# Patient Record
Sex: FEMALE | Race: BLACK OR AFRICAN AMERICAN | Employment: UNEMPLOYED | URBAN - METROPOLITAN AREA
[De-identification: names, ages, dates, MRNs, and addresses within clinical notes are randomized per-mention and may not be internally consistent; named-entity substitution may affect disease eponyms.]

---

## 2017-05-08 ENCOUNTER — TELEPHONE (OUTPATIENT)
Dept: SLEEP MEDICINE | Age: 37
End: 2017-05-08

## 2017-05-08 DIAGNOSIS — G47.33 OSA (OBSTRUCTIVE SLEEP APNEA): Primary | ICD-10-CM

## 2017-05-08 NOTE — TELEPHONE ENCOUNTER
Patient called in requesting that her supply order be sent to [address] [place] [address] since her insurance has changed to [facility].  She does not have a current order for supplies in her chart but was last seen for follow up in 12/16

## 2017-05-08 NOTE — TELEPHONE ENCOUNTER
Orders Placed This Encounter    AMB SUPPLY ORDER     Diagnosis: (G47.33) RILEY (obstructive sleep apnea)  (primary encounter diagnosis)     Replacement Supplies for Positive Airway Pressure Therapy Device:   Duration of need: 99 months.  Oral/Nasal Combo Mask 1 every 3 months.  Oral Cushion Combo Mask (Replace) 2 per month.  Nasal Pillows Combo Mask (Replace) 2 per month.  Full Face Mask 1 every 3 months.  Full Face Mask Cushion 1 per month.  Nasal Cushion (Replace) 2 per month.  Nasal Pillows (Replace) 2 per month.  Nasal Interface Mask 1 every 3 months.  Headgear 1 every 6 months.  Chinstrap 1 every 6 months.  Tubing 1 every 3 months.  Filter(s) Disposable 2 per month.  Filter(s) Non-Disposable 1 every 6 months.  Oral Interface 1 every 3 months. [address] for [employer] (Replace) 1 every 6 months.  Tubing with heating element 1 every 3 months. Perform Mask Fitting per patient preference and comfort - replace as above. Mckenzie Meza MD, FAASM; NPI: [id number]    Electronically signed. Date:- 05-08-17.

## 2017-05-09 NOTE — TELEPHONE ENCOUNTER
Waiting for pt to call back with [facility] [place]. We have Medicare on file  .

## 2017-05-22 ENCOUNTER — DOCUMENTATION ONLY (OUTPATIENT)
Dept: SLEEP MEDICINE | Age: 37
End: 2017-05-22

## 2017-10-17 ENCOUNTER — OFFICE VISIT (OUTPATIENT)
Dept: OBGYN CLINIC | Age: 37
End: 2017-10-17

## 2017-10-17 ENCOUNTER — HOSPITAL ENCOUNTER (OUTPATIENT)
Dept: LAB | Age: 37
Discharge: HOME | End: 2017-10-17

## 2017-10-17 VITALS
HEART RATE: 110 BPM | SYSTOLIC BLOOD PRESSURE: 140 MMHG | WEIGHT: 287.4 LBS | BODY MASS INDEX: 50.92 KG/M2 | HEIGHT: 63 IN | TEMPERATURE: 97.4 F | RESPIRATION RATE: 20 BRPM | DIASTOLIC BLOOD PRESSURE: 90 MMHG

## 2017-10-17 DIAGNOSIS — N76.0 ACUTE VAGINITIS: ICD-10-CM

## 2017-10-17 DIAGNOSIS — Z01.419 WELL WOMAN EXAM WITH ROUTINE GYNECOLOGICAL EXAM: ICD-10-CM

## 2017-10-17 DIAGNOSIS — N89.8 VAGINAL DISCHARGE: ICD-10-CM

## 2017-10-17 DIAGNOSIS — N39.0 FREQUENT UTI: Primary | ICD-10-CM

## 2017-10-17 LAB
BILIRUB UR QL STRIP: NEGATIVE
GLUCOSE UR-MCNC: NEGATIVE MG/DL
KETONES P FAST UR STRIP-MCNC: NORMAL MG/DL
PH UR STRIP: 7 [PH] (ref 4.6–8)
PROT UR QL STRIP: NORMAL MG/DL
SP GR UR STRIP: 1.02 (ref 1–1.03)
UA UROBILINOGEN AMB POC: NORMAL (ref 0.2–1)
URINALYSIS CLARITY POC: CLEAR
URINALYSIS COLOR POC: YELLOW
URINE BLOOD POC: NORMAL
URINE LEUKOCYTES POC: NEGATIVE
URINE NITRITES POC: NEGATIVE

## 2017-10-17 RX ORDER — COLCHICINE 0.6 MG/1
TABLET, FILM COATED ORAL AS NEEDED
COMMUNITY
Start: 2017-09-11

## 2017-10-17 RX ORDER — PREGABALIN 150 MG/1
150 CAPSULE ORAL 2 TIMES DAILY
COMMUNITY
Start: 2017-10-05

## 2017-10-17 NOTE — PROGRESS NOTES
Chief Complaint   Patient presents with    Well Woman     Pt wants STD testing. Pt also complains of frequent yeast infections, and UTI's.

## 2017-10-17 NOTE — PATIENT INSTRUCTIONS
Mammogram: About This Test  What is it? A mammogram is an X-ray of the breast that is used to screen for breast cancer. This test can find tumors that are too small for you or your doctor to feel. Cancer is most easily treated and cured when it is found at an early stage. Why is this test done? A mammogram is done to:  · Look for breast cancer in women who don't have symptoms. · Find breast cancer in women who have symptoms. Symptoms of breast cancer may include a lump or thickening in the breast, nipple discharge, or dimpling of the skin on one area of the breast.  · Find an area of suspicious breast tissue to remove for an exam under a microscope (biopsy). How can you prepare for the test?  · Tell your doctor if you:  ¨ Are or might be pregnant. ¨ Are breastfeeding. ¨ Have breast implants. ¨ Have previously had a breast biopsy. · On the day of the test, don't use any deodorant, perfume, powders, or ointments. What happens before the test?  · You will need to take off any jewelry that might interfere with the X-ray pictures. · You will need to take off your clothes above the waist.  · You will be given a cloth or paper gown to use during the test.  What happens during the test?  · You usually stand during a mammogram.  · One at a time, your breasts will be placed on a flat plate that contains the X-ray film. · Another plate is then pressed firmly against your breast to help flatten out the breast tissue. You may be asked to lift your arm. · For a few seconds while the X-ray picture is being taken, you will need to hold your breath. · At least two pictures are taken of each breast. One is taken from the top and one from the side. What else should you know about the test?  · The X-ray plate will feel cold when you place your breast on it. Having your breasts flattened and squeezed isn't comfortable. But it is necessary to flatten out the breast tissue to get the best pictures.   · Mammograms do not prevent breast cancer or reduce a woman's risk of developing cancer. · Most things that are found during a mammogram are not breast cancer. How long does the test take? · The test will take about 10 to 15 minutes. You may be in the clinic for up to an hour. What happens after the test?  · You will probably be able to go home right away. · You can go back to your usual activities right away. Follow-up care is a key part of your treatment and safety. Be sure to make and go to all appointments, and call your doctor if you are having problems. It's also a good idea to keep a list of the medicines you take. Ask your doctor when you can expect to have your test results. Where can you learn more? Go to http://jd-rossana.info/. Enter J986 in the search box to learn more about \"Mammogram: About This Test.\"  Current as of: July 26, 2016  Content Version: 11.3  © 4020-2416 [employer]. Care instructions adapted under license by [employer] (which disclaims liability or warranty for this information). If you have questions about a medical condition or this instruction, always ask your healthcare professional. [address] any warranty or liability for your use of this information.

## 2017-10-17 NOTE — MR AVS SNAPSHOT
Visit Information Date & Time Provider Department Dept. Phone Encounter #  
 10/17/2017  1:30 PM [phone] [id number] Follow-up Instructions Return in about 1 year (around 10/17/2018). Upcoming Health Maintenance Date Due INFLUENZA AGE 9 TO ADULT 8/1/2017 PAP AKA CERVICAL CYTOLOGY 3/4/2019 Allergies as of 10/17/2017  Review Complete On: 10/17/2017 By: Sarah Beth Tapia NP Severity Noted Reaction Type Reactions Latex  11/17/2011    Rash Current Immunizations  Never Reviewed No immunizations on file. Not reviewed this visit You Were Diagnosed With   
  
 Codes Comments Frequent UTI    -  Primary ICD-10-CM: N39.0 ICD-9-CM: 599.0 Vitals BP Pulse Temp Resp Height(growth percentile) Weight(growth percentile) 140/90 (BP 1 Location: Left arm, BP Patient Position: Sitting) (!) 110 97.4 °F (36.3 °C) (Oral) 20 5' 3\" (1.6 m) 287 lb 6.4 oz (130.4 kg) LMP BMI OB Status Smoking Status 03/25/2013 50.91 kg/m2 Hysterectomy Former Smoker Vitals History BMI and BSA Data Body Mass Index Body Surface Area 50.91 kg/m 2 2.41 m 2 Preferred Pharmacy Pharmacy Name Phone [facility] PRINCESS [email or web address], [address] [phone] Your Updated Medication List  
  
   
This list is accurate as of: 10/17/17  2:45 PM.  Always use your most recent med list.  
  
  
  
  
 COLCRYS 0.6 mg tablet Generic drug:  colchicine CYMBALTA 30 mg capsule Generic drug:  DULoxetine Take 30 mg by mouth daily. hydroCHLOROthiazide 25 mg tablet Commonly known as:  HYDRODIURIL Take 25 mg by mouth daily. HYDROcodone-acetaminophen 5-325 mg per tablet Commonly known as:  Kei Herrlich Take  by mouth. prn  
  
 ibuprofen 200 mg tablet Commonly known as:  MOTRIN Take 3 Tabs by mouth two (2) times a day. JANUVIA 50 mg tablet Generic drug:  SITagliptin Take 50 mg by mouth daily. * LYRICA 50 mg capsule Generic drug:  pregabalin Take  by mouth. * LYRICA 75 mg capsule Generic drug:  pregabalin  
  
 metoprolol tartrate 50 mg tablet Commonly known as:  LOPRESSOR Take  by mouth two (2) times a day. NexIUM 40 mg capsule Generic drug:  esomeprazole Take 40 mg by mouth daily. terconazole 0.4 % vaginal cream  
Commonly known as:  TERAZOL 7 Insert 1 Applicator into vagina nightly. traZODone 50 mg tablet Commonly known as:  Alvarez Awkward Take 50 mg by mouth nightly. WELLBUTRIN  mg tablet Generic drug:  buPROPion XL Take 150 mg by mouth every morning. Indications: DEPRESSION ASSOCIATED WITH MANIC DEPRESSIVE DISORDER * Notice: This list has 2 medication(s) that are the same as other medications prescribed for you. Read the directions carefully, and ask your doctor or other care provider to review them with you. We Performed the Following AMB POC URINALYSIS DIP STICK MANUAL W/O MICRO [14333 CPT(R)] Follow-up Instructions Return in about 1 year (around 10/17/2018). To-Do List   
 11/13/2017 Imaging:  [facility] MAMMO BI SCREENING INCL CAD Patient Instructions Mammogram: About This Test 
What is it? A mammogram is an X-ray of the breast that is used to screen for breast cancer. This test can find tumors that are too small for you or your doctor to feel. Cancer is most easily treated and cured when it is found at an early stage. Why is this test done? A mammogram is done to: 
· Look for breast cancer in women who don't have symptoms. · Find breast cancer in women who have symptoms. Symptoms of breast cancer may include a lump or thickening in the breast, nipple discharge, or dimpling of the skin on one area of the breast. 
· Find an area of suspicious breast tissue to remove for an exam under a microscope (biopsy). How can you prepare for the test? 
· Tell your doctor if you: ¨ Are or might be pregnant. ¨ Are breastfeeding. ¨ Have breast implants. ¨ Have previously had a breast biopsy. · On the day of the test, don't use any deodorant, perfume, powders, or ointments. What happens before the test? 
· You will need to take off any jewelry that might interfere with the X-ray pictures. · You will need to take off your clothes above the waist. 
· You will be given a cloth or paper gown to use during the test. 
What happens during the test? 
· You usually stand during a mammogram. 
· One at a time, your breasts will be placed on a flat plate that contains the X-ray film. · Another plate is then pressed firmly against your breast to help flatten out the breast tissue. You may be asked to lift your arm. · For a few seconds while the X-ray picture is being taken, you will need to hold your breath. · At least two pictures are taken of each breast. One is taken from the top and one from the side. What else should you know about the test? 
· The X-ray plate will feel cold when you place your breast on it. Having your breasts flattened and squeezed isn't comfortable. But it is necessary to flatten out the breast tissue to get the best pictures. · Mammograms do not prevent breast cancer or reduce a woman's risk of developing cancer. · Most things that are found during a mammogram are not breast cancer. How long does the test take? · The test will take about 10 to 15 minutes. You may be in the clinic for up to an hour. What happens after the test? 
· You will probably be able to go home right away. · You can go back to your usual activities right away. Follow-up care is a key part of your treatment and safety. Be sure to make and go to all appointments, and call your doctor if you are having problems. It's also a good idea to keep a list of the medicines you take. Ask your doctor when you can expect to have your test results. Where can you learn more? Go to http://jd-rossana.info/. Enter Y263 in the search box to learn more about \"Mammogram: About This Test.\" Current as of: July 26, 2016 Content Version: 11.3 © 3304-7249 [employer]. Care instructions adapted under license by [employer] (which disclaims liability or warranty for this information). If you have questions about a medical condition or this instruction, always ask your healthcare professional. [address] any warranty or liability for your use of this information. Introducing [facility]! [employer] introduces Slated patient portal. Now you can access parts of your medical record, email your doctor's office, and request medication refills online. 1. In your internet browser, go to [email or web address] 2. Click on the First Time User? Click Here link in the Sign In box. You will see the New Member Sign Up page. 3. Enter your Slated Access Code exactly as it appears below. You will not need to use this code after youve completed the sign-up process. If you do not sign up before the expiration date, you must request a new code. · Slated Access Code: [email or web address] Expires: 1/15/2018  2:45 PM 
 
4. Enter the last four digits of your Social Security Number (xxxx) and Date of Birth (mm/dd/yyyy) as indicated and click Submit. You will be taken to the next sign-up page. 5. Create a Slated ID. This will be your Slated login ID and cannot be changed, so think of one that is secure and easy to remember. 6. Create a Slated password. You can change your password at any time. 7. Enter your Password Reset Question and Answer. This can be used at a later time if you forget your password. 8. Enter your e-mail address. You will receive e-mail notification when new information is available in [address] 9. Click Sign Up. You can now view and download portions of your medical record. 10. Click the Download Summary menu link to download a portable copy of your medical information. If you have questions, please visit the Frequently Asked Questions section of the [employer] website. Remember, [employer] is NOT to be used for urgent needs. For medical emergencies, dial 911. Now available from your iPhone and Android! Please provide this summary of care documentation to your next provider. Your primary care clinician is listed as Merlin Kuster. If you have any questions after today's visit, please call [phone].

## 2017-10-17 NOTE — PROGRESS NOTES
SUBJECTIVE: Elizabeth Guadalupe is a 42 y.o. female  (twins) s/p hyst. who presents desire for annual well woman exam. She desires testing for STD's with complaint of vaginal discharge. She reports a history of frequent urinary infections and desires a urine dipstick today. She is due for mammogram at age 34. Patient's last menstrual period was 2013. GYN History  Dysmenorrhea:  NO  Contraception:  none  Sexually transmitted diseases/infections: denies  Urinary symptoms:  YES  Dyspareunia: NO    Last pap: 2016  The prior Pap result: normal    Past Medical History:   Diagnosis Date    Arthritis      in hands    Diabetes ([address]     prediabetic    Hypertension     Morbid obesity ([address]     Psychiatric disorder     depression    Unspecified sleep apnea        Past Surgical History:   Procedure Laterality Date    CARDIAC SURG PROCEDURE UNLIST      cardian cath    HX CARPAL TUNNEL RELEASE      both hands,    HX GYN      novasure in     HX GYN  13    ROBOTIC ASSISTED TOTAL LAPAROSCOPIC HYSTERECTOMY       Family History   Problem Relation Age of Onset    Heart Disease Father     Cancer Maternal Grandmother      breast cancer    Hypertension Paternal Grandmother     Cancer Paternal Grandmother      breast    Lupus Sister        Social History     Social History    Marital status: SINGLE     Spouse name: N/A    Number of children: N/A    Years of education: N/A     Occupational History    Not on file.      Social History Main Topics    Smoking status: Former Smoker     Quit date: 2016    Smokeless tobacco: Never Used    Alcohol use Yes      Comment: social    Drug use: No    Sexual activity: Yes     Partners: Male     Birth control/ protection: None     Other Topics Concern    Not on file     Social History Narrative       Current Outpatient Prescriptions   Medication Sig Dispense Refill    LYRICA 75 mg capsule       COLCRYS 0.6 mg tablet       HYDROcodone-acetaminophen (NORCO) 5-325 mg per tablet Take  by mouth. prn      pregabalin (LYRICA) 50 mg capsule Take  by mouth.  hydrochlorothiazide (HYDRODIURIL) 25 mg tablet Take 25 mg by mouth daily.  sitaGLIPtin (JANUVIA) 50 mg tablet Take 50 mg by mouth daily.  ibuprofen (MOTRIN) 200 mg tablet Take 3 Tabs by mouth two (2) times a day. 90 Tab 1    esomeprazole (NEXIUM) 40 mg capsule Take 40 mg by mouth daily.  buPROPion XL (WELLBUTRIN XL) 150 mg tablet Take 150 mg by mouth every morning. Indications: DEPRESSION ASSOCIATED WITH MANIC DEPRESSIVE DISORDER      metoprolol (LOPRESSOR) 50 mg tablet Take  by mouth two (2) times a day.  terconazole (TERAZOL 7) 0.4 % vaginal cream Insert 1 Applicator into vagina nightly. 45 g 0    traZODone (DESYREL) 50 mg tablet Take 50 mg by mouth nightly.  DULoxetine (CYMBALTA) 30 mg capsule Take 30 mg by mouth daily. Review of Systems:   Complete review of systems reviewed from social and history data forms. Pertinent positives in HPI. Objective:     Visit Vitals    /90 (BP 1 Location: Left arm, BP Patient Position: Sitting)  Comment (BP 1 Location): manual    Pulse (!) 110    Temp 97.4 °F (36.3 °C) (Oral)    Resp 20    Ht 5' 3\" (1.6 m)    Wt 287 lb 6.4 oz (130.4 kg)    LMP 03/25/2013    BMI 50.91 kg/m2       General:  alert, cooperative, no distress, appears stated age   Skin:  Normal.   Lymph Nodes:  Cervical, supraclavicular, and axillary nodes normal.   Breast Exam: normal appearance, no masses or tenderness    Lungs:  clear to auscultation bilaterally   Heart:  regular rate and rhythm, S1, S2 normal, no murmur, click, rub or gallop   Abdomen: soft, non-tender.  Bowel sounds normal. No masses,  no organomegaly   Back:  Costovertebral angle tenderness absent   Genitourinary: BUS normal. Introitus normal. Normal appearing vaginal epithelium, Vaginal discharge described as normal and physiologic.,  Cervix absent, Uterus absent., Adnexa normal in size left and right without tenderness. Extremities:  extremities normal, atraumatic, no cyanosis or edema     Neurologic:  negative   Psychiatric:  non focal       ASSESSMENT:      ICD-10-CM ICD-9-CM    1. Frequent UTI N39.0 599.0 AMB POC URINALYSIS DIP STICK MANUAL W/O MICRO      [facility] MAMMO BI SCREENING INCL CAD   2. Well woman exam with routine gynecological exam Z01.419 V72.31 PAP IG, RFX HPV ASCU, 25&18,93([id number])   3. Vaginal discharge N89.8 623.5 NUSWAB VAGINITIS PLUS   4. Acute vaginitis  N76.0 616.10 NUSWAB VAGINITIS PLUS     Plan:  Nuswab plus taken. Mammogram in 2018- slip given. RTO 1 year. Pt. Voices understanding of treatment plan. Follow-up Disposition:  Return in about 1 year (around 10/17/2018).       Shania Waite NP

## 2017-10-21 LAB
A VAGINAE DNA VAG QL NAA+PROBE: ABNORMAL SCORE
BVAB2 DNA VAG QL NAA+PROBE: ABNORMAL SCORE
C ALBICANS DNA VAG QL NAA+PROBE: NEGATIVE
C GLABRATA DNA VAG QL NAA+PROBE: NEGATIVE
C TRACH RRNA SPEC QL NAA+PROBE: NEGATIVE
MEGA1 DNA VAG QL NAA+PROBE: ABNORMAL SCORE
N GONORRHOEA RRNA SPEC QL NAA+PROBE: NEGATIVE
T VAGINALIS RRNA SPEC QL NAA+PROBE: NEGATIVE

## 2017-10-23 ENCOUNTER — TELEPHONE (OUTPATIENT)
Dept: OBGYN CLINIC | Age: 37
End: 2017-10-23

## 2017-10-23 RX ORDER — METRONIDAZOLE 500 MG/1
500 TABLET ORAL 2 TIMES DAILY
Qty: 14 TAB | Refills: 0 | Status: SHIPPED | OUTPATIENT
Start: 2017-10-23 | End: 2017-10-30

## 2018-05-24 ENCOUNTER — OFFICE VISIT (OUTPATIENT)
Dept: OBGYN CLINIC | Age: 38
End: 2018-05-24

## 2018-05-24 VITALS
SYSTOLIC BLOOD PRESSURE: 151 MMHG | HEART RATE: 81 BPM | HEIGHT: 63 IN | BODY MASS INDEX: 50.36 KG/M2 | DIASTOLIC BLOOD PRESSURE: 86 MMHG | WEIGHT: 284.2 LBS | TEMPERATURE: 96.9 F | RESPIRATION RATE: 20 BRPM

## 2018-05-24 DIAGNOSIS — N89.8 VAGINAL DISCHARGE: Primary | ICD-10-CM

## 2018-05-24 RX ORDER — NYSTATIN 500000 [USP'U]/1
1 TABLET, COATED ORAL 2 TIMES DAILY
Qty: 14 TAB | Refills: 0 | Status: SHIPPED | OUTPATIENT
Start: 2018-05-24 | End: 2018-05-31

## 2018-05-24 RX ORDER — MOMETASONE FUROATE 50 UG/1
SPRAY, METERED NASAL
COMMUNITY

## 2018-05-24 NOTE — PROGRESS NOTES
Chief Complaint   Patient presents with    Vaginitis     Pt complains of clear discharge for 1-2 weeks. Pt complains of lower abdominal pain for 1-2 weeks. Pt denies any pain, frequency with urination.

## 2018-05-24 NOTE — MR AVS SNAPSHOT
[address] [facility] [address] 1400 [address] [place] [address] 
[phone] Patient: Allan Sheikh MRN: [id number] :1980 Visit Information Date & Time Provider Department Dept. Phone Encounter #  
 2018 10:00 AM Alena Holley NP [facility] OBGYN AT [address] [id number] Upcoming Health Maintenance Date Due Influenza Age 5 to Adult 2018 PAP AKA CERVICAL CYTOLOGY 3/4/2019 Allergies as of 2018  Review Complete On: 2018 By: Alena Holley NP Severity Noted Reaction Type Reactions Latex  2011    Rash Current Immunizations  Never Reviewed No immunizations on file. Not reviewed this visit Vitals BP Pulse Temp Resp Height(growth percentile) Weight(growth percentile) 151/86 (BP 1 Location: Left arm, BP Patient Position: Sitting) 81 96.9 °F (36.1 °C) (Oral) 20 5' 3\" (1.6 m) 284 lb 3.2 oz (128.9 kg) LMP BMI OB Status Smoking Status 2013 50.34 kg/m2 Hysterectomy Former Smoker Vitals History BMI and BSA Data Body Mass Index Body Surface Area  
 50.34 kg/m 2 2.39 m 2 Preferred Pharmacy Pharmacy Name Phone [email or web address], [address] [phone] Your Updated Medication List  
  
   
This list is accurate as of 18 10:51 AM.  Always use your most recent med list.  
  
  
  
  
 COLCRYS 0.6 mg tablet Generic drug:  colchicine CYMBALTA 30 mg capsule Generic drug:  DULoxetine Take 30 mg by mouth daily. hydroCHLOROthiazide 25 mg tablet Commonly known as:  HYDRODIURIL Take 25 mg by mouth daily. HYDROcodone-acetaminophen 5-325 mg per tablet Commonly known as:  Liz [place] Take  by mouth. prn  
  
 ibuprofen 200 mg tablet Commonly known as:  MOTRIN Take 3 Tabs by mouth two (2) times a day. JANUVIA 50 mg tablet Generic drug:  SITagliptin Take 50 mg by mouth daily. * LYRICA 50 mg capsule Generic drug:  pregabalin Take  by mouth. * LYRICA 75 mg capsule Generic drug:  pregabalin 75 mg daily. metoprolol tartrate 50 mg tablet Commonly known as:  LOPRESSOR Take  by mouth two (2) times a day. NASONEX 50 mcg/actuation nasal spray Generic drug:  mometasone Nasonex 50 mcg/actuation Spray NexIUM 40 mg capsule Generic drug:  esomeprazole Take 40 mg by mouth daily. nystatin 500,000 unit Tab Commonly known as:  MYCOSTATIN Take 1 Tab by mouth two (2) times a day for 7 days. terconazole 0.4 % vaginal cream  
Commonly known as:  TERAZOL 7 Insert 1 Applicator into vagina nightly. traZODone 50 mg tablet Commonly known as:  Lucie Richardbach Take 50 mg by mouth nightly. WELLBUTRIN  mg tablet Generic drug:  buPROPion XL Take 150 mg by mouth every morning. Indications: DEPRESSION ASSOCIATED WITH MANIC DEPRESSIVE DISORDER * Notice: This list has 2 medication(s) that are the same as other medications prescribed for you. Read the directions carefully, and ask your doctor or other care provider to review them with you. Prescriptions Printed Refills  
 nystatin (MYCOSTATIN) 500,000 unit tab 0 Sig: Take 1 Tab by mouth two (2) times a day for 7 days. Class: Print Route: Oral  
  
Patient Instructions Vaginal Yeast Infection: Care Instructions Your Care Instructions A vaginal yeast infection is caused by too many yeast cells in the vagina. This is common in women of all ages. Itching, vaginal discharge and irritation, and other symptoms can bother you. But yeast infections don't often cause other health problems. Some medicines can increase your risk of getting a yeast infection. These include antibiotics, birth control pills, hormones, and steroids.  You may also be more likely to get a yeast infection if you are pregnant, have diabetes, douche, or wear tight clothes. With treatment, most yeast infections get better in 2 to 3 days. Follow-up care is a key part of your treatment and safety. Be sure to make and go to all appointments, and call your doctor if you are having problems. It's also a good idea to know your test results and keep a list of the medicines you take. How can you care for yourself at home? · Take your medicines exactly as prescribed. Call your doctor if you think you are having a problem with your medicine. · Ask your doctor about over-the-counter (OTC) medicines for yeast infections. They may cost less than prescription medicines. If you use an OTC treatment, read and follow all instructions on the label. · Do not use tampons while using a vaginal cream or suppository. The tampons can absorb the medicine. Use pads instead. · Wear loose cotton clothing. Do not wear nylon or other fabric that holds body heat and moisture close to the skin. · Try sleeping without underwear. · Do not scratch. Relieve itching with a cold pack or a cool bath. · Do not wash your vaginal area more than once a day. Use plain water or a mild, unscented soap. Air-dry the vaginal area. · Change out of wet swimsuits after swimming. · Do not have sex until you have finished your treatment. · Do not douche. When should you call for help? Call your doctor now or seek immediate medical care if: 
? · You have unexpected vaginal bleeding. ? · You have new or increased pain in your vagina or pelvis. ? Watch closely for changes in your health, and be sure to contact your doctor if: 
? · You have a fever. ? · You are not getting better after 2 days. ? · Your symptoms come back after you finish your medicines. Where can you learn more? Go to http://jd-rossana.info/. Enter S856 in the search box to learn more about \"Vaginal Yeast Infection: Care Instructions. \" Current as of: October 13, 2016 Content Version: 11.4 © 0636-8204 Healthwise, HipFlat. Care instructions adapted under license by [employer] (which disclaims liability or warranty for this information). If you have questions about a medical condition or this instruction, always ask your healthcare professional. [address] any warranty or liability for your use of this information. Introducing [facility]! [employer] introduces InvitedHome patient portal. Now you can access parts of your medical record, email your doctor's office, and request medication refills online. 1. In your internet browser, go to [email or web address] 2. Click on the First Time User? Click Here link in the Sign In box. You will see the New Member Sign Up page. 3. Enter your InvitedHome Access Code exactly as it appears below. You will not need to use this code after youve completed the sign-up process. If you do not sign up before the expiration date, you must request a new code. · InvitedHome Access Code: [email or web address] Expires: 8/22/2018 10:51 AM 
 
4. Enter the last four digits of your Social Security Number (xxxx) and Date of Birth (mm/dd/yyyy) as indicated and click Submit. You will be taken to the next sign-up page. 5. Create a InvitedHome ID. This will be your InvitedHome login ID and cannot be changed, so think of one that is secure and easy to remember. 6. Create a InvitedHome password. You can change your password at any time. 7. Enter your Password Reset Question and Answer. This can be used at a later time if you forget your password. 8. Enter your e-mail address. You will receive e-mail notification when new information is available in [address] 9. Click Sign Up. You can now view and download portions of your medical record. 10. Click the Download Summary menu link to download a portable copy of your medical information. If you have questions, please visit the Frequently Asked Questions section of the [employer] website. Remember, nfon is NOT to be used for urgent needs. For medical emergencies, dial 911. Now available from your iPhone and Android! Please provide this summary of care documentation to your next provider. Your primary care clinician is listed as James Oakes. If you have any questions after today's visit, please call [phone].

## 2018-05-24 NOTE — PROGRESS NOTES
SUBJECTIVE: Jaguar Devries is a 34 [facility]  who presents with complaints of increased vaginal discharge. She thinks she has \"a yeast infection.'  She denies concerns about STD's. She denies itching or vaginal odor. She thinks her symptoms have been present for about 2 weeks. Patient's last menstrual period was 04/17/2013. .    ROS: A comprehensive review of systems was negative except for that written in the HPI. OBJECTIVE:     Visit Vitals    /86 (BP 1 Location: Left arm, BP Patient Position: Sitting)    Pulse 81    Temp 96.9 °F (36.1 °C) (Oral)    Resp 20    Ht 5' 3\" (1.6 m)    Wt 284 lb 3.2 oz (128.9 kg)    LMP 04/17/2013    BMI 50.34 kg/m2         General:  alert, cooperative, no distress, appears stated age   Skin:  Normal.   Abdomen: soft, non-tender. Bowel sounds normal. No masses,  no organomegaly   Back:  Costovertebral angle tenderness absent   Genitourinary: External genitalia: normal general appearance  Urinary system: urethral meatus normal  Vaginal: normal mucosa without prolapse or lesions and discharge,   Cervix: normal appearance  Adnexa: normal bimanual exam  Uterus: normal single, nontender   Extremities:  extremities normal, atraumatic, no cyanosis or edema   Neurologic:  negative   Psychiatric:  non focal       ASSESSMENT:  Vaginitis    Plan:  NuSwab taken. Rx. Nystatin 500,000 units, 1 po bid x 7 days, #14 given. RTO prn. No diagnosis found. Pt. Voices understanding of treatment plan.      Follow-up Disposition: Not on File

## 2018-05-24 NOTE — PATIENT INSTRUCTIONS
Vaginal Yeast Infection: Care Instructions  Your Care Instructions    A vaginal yeast infection is caused by too many yeast cells in the vagina. This is common in women of all ages. Itching, vaginal discharge and irritation, and other symptoms can bother you. But yeast infections don't often cause other health problems. Some medicines can increase your risk of getting a yeast infection. These include antibiotics, birth control pills, hormones, and steroids. You may also be more likely to get a yeast infection if you are pregnant, have diabetes, douche, or wear tight clothes. With treatment, most yeast infections get better in 2 to 3 days. Follow-up care is a key part of your treatment and safety. Be sure to make and go to all appointments, and call your doctor if you are having problems. It's also a good idea to know your test results and keep a list of the medicines you take. How can you care for yourself at home? · Take your medicines exactly as prescribed. Call your doctor if you think you are having a problem with your medicine. · Ask your doctor about over-the-counter (OTC) medicines for yeast infections. They may cost less than prescription medicines. If you use an OTC treatment, read and follow all instructions on the label. · Do not use tampons while using a vaginal cream or suppository. The tampons can absorb the medicine. Use pads instead. · Wear loose cotton clothing. Do not wear nylon or other fabric that holds body heat and moisture close to the skin. · Try sleeping without underwear. · Do not scratch. Relieve itching with a cold pack or a cool bath. · Do not wash your vaginal area more than once a day. Use plain water or a mild, unscented soap. Air-dry the vaginal area. · Change out of wet swimsuits after swimming. · Do not have sex until you have finished your treatment. · Do not douche. When should you call for help?   Call your doctor now or seek immediate medical care if:  ? · You have unexpected vaginal bleeding. ? · You have new or increased pain in your vagina or pelvis. ? Watch closely for changes in your health, and be sure to contact your doctor if:  ? · You have a fever. ? · You are not getting better after 2 days. ? · Your symptoms come back after you finish your medicines. Where can you learn more? Go to http://jd-rossana.info/. Enter O624 in the search box to learn more about \"Vaginal Yeast Infection: Care Instructions. \"  Current as of: October 13, 2016  Content Version: 11.4  © 4343-4560 KnowFu. Care instructions adapted under license by TrueInsider (which disclaims liability or warranty for this information). If you have questions about a medical condition or this instruction, always ask your healthcare professional. [address] any warranty or liability for your use of this information.

## 2018-05-27 LAB
A VAGINAE DNA VAG QL NAA+PROBE: ABNORMAL SCORE
BVAB2 DNA VAG QL NAA+PROBE: ABNORMAL SCORE
C ALBICANS DNA VAG QL NAA+PROBE: POSITIVE
C GLABRATA DNA VAG QL NAA+PROBE: POSITIVE
MEGA1 DNA VAG QL NAA+PROBE: ABNORMAL SCORE
T VAGINALIS RRNA SPEC QL NAA+PROBE: NEGATIVE

## 2018-05-29 ENCOUNTER — TELEPHONE (OUTPATIENT)
Dept: OBGYN CLINIC | Age: 38
End: 2018-05-29

## 2018-05-29 NOTE — TELEPHONE ENCOUNTER
----- Message from Deejay Louis 84, NP sent at 5/29/2018  9:49 AM EDT -----  Please notify pt that she did test positive for a yeast infection and was already treated at her visit.

## 2018-05-29 NOTE — PROGRESS NOTES
Please notify pt that she did test positive for a yeast infection and was already treated at her visit.

## 2018-05-30 ENCOUNTER — DOCUMENTATION ONLY (OUTPATIENT)
Dept: SLEEP MEDICINE | Age: 38
End: 2018-05-30

## 2018-05-30 NOTE — PROGRESS NOTES
Supply order sent to [address] [place] [address] per patient request due to insurance changing.

## 2018-06-21 ENCOUNTER — HOSPITAL ENCOUNTER (OUTPATIENT)
Dept: MAMMOGRAPHY | Age: 38
Discharge: HOME | End: 2018-06-21
Attending: FAMILY MEDICINE
Payer: MEDICARE

## 2018-06-21 DIAGNOSIS — Z12.39 SCREENING BREAST EXAMINATION: ICD-10-CM

## 2018-06-21 PROCEDURE — 77067 SCR MAMMO BI INCL CAD: CPT

## 2018-10-30 ENCOUNTER — OFFICE VISIT (OUTPATIENT)
Dept: OBGYN CLINIC | Age: 38
End: 2018-10-30

## 2018-10-30 VITALS
WEIGHT: 285.4 LBS | BODY MASS INDEX: 50.57 KG/M2 | TEMPERATURE: 97.4 F | RESPIRATION RATE: 20 BRPM | DIASTOLIC BLOOD PRESSURE: 95 MMHG | HEART RATE: 92 BPM | HEIGHT: 63 IN | SYSTOLIC BLOOD PRESSURE: 158 MMHG

## 2018-10-30 DIAGNOSIS — N89.8 VAGINAL DISCHARGE: Primary | ICD-10-CM

## 2018-10-30 DIAGNOSIS — Z11.3 SCREENING EXAMINATION FOR STD (SEXUALLY TRANSMITTED DISEASE): ICD-10-CM

## 2018-10-30 DIAGNOSIS — N89.8 VAGINAL IRRITATION: ICD-10-CM

## 2018-10-30 NOTE — PATIENT INSTRUCTIONS
Vaginitis: Care Instructions  Your Care Instructions    Vaginitis is soreness or infection of the vagina. This common problem can cause itching and burning. And it can cause a change in vaginal discharge. Sometimes it can cause pain during sex. Vaginitis may be caused by bacteria, yeast, or other germs. Some infections that cause it are caught from a sexual partner. Bath products, spermicides, and douches can irritate the vagina too. Some women have this problem during and after menopause. A drop in estrogen levels during this time can cause dryness, soreness, and pain during sex. Your doctor can give you medicine to treat an infection. And home care may help you feel better. For certain types of infections, your sex partner must be treated too. Follow-up care is a key part of your treatment and safety. Be sure to make and go to all appointments, and call your doctor if you are having problems. It's also a good idea to know your test results and keep a list of the medicines you take. How can you care for yourself at home? · If your doctor prescribed antibiotics, take them as directed. Do not stop taking them just because you feel better. You need to take the full course of antibiotics. · Take your medicines exactly as prescribed. Call your doctor if you think you are having a problem with your medicine. · Do not eat or drink anything that has alcohol if you are taking metronidazole (Flagyl). · If you have a yeast infection, use over-the-counter products as your doctor tells you to. Or take medicine your doctor prescribes exactly as directed. · Wash your vaginal area daily with water. You also can use a mild, unscented soap if you want. · Do not use scented bath products. And do not use vaginal sprays or douches. · Put a washcloth soaked in cool water on the area to relieve itching. Or you can take cool baths.   · If you have dryness because of menopause, use estrogen cream or pills that your doctor prescribes. · Ask your doctor about when it is okay to have sex. · Use a personal lubricant before sex if you have dryness. Examples are Astroglide, K-Y Jelly, and Wet Lubricant Gel. · Ask your doctor if your sex partner also needs treatment. When should you call for help? Call your doctor now or seek immediate medical care if:    · You have a fever and pelvic pain.    Watch closely for changes in your health, and be sure to contact your doctor if:    · You have bleeding other than your period.     · You do not get better as expected. Where can you learn more? Go to http://jd-rossana.info/. Enter B486 in the search box to learn more about \"Vaginitis: Care Instructions. \"  Current as of: May 15, 2018  Content Version: 11.8  © 4964-0293 Healthwise, Incorporated. Care instructions adapted under license by Entrisphere (which disclaims liability or warranty for this information). If you have questions about a medical condition or this instruction, always ask your healthcare professional. [address] any warranty or liability for your use of this information.

## 2018-10-30 NOTE — PROGRESS NOTES
SUBJECTIVE: Jyothi Vaughan is a 34 y.o. female G2 [address] s/p hysterectomy who presents with complaints of increased vaginal discharge with irritation on and off for the last 3-4 weeks. Pt. Did have a sexual encounter about 4 weeks ago with out protection and is concerned she may have acquired an STD. She denies pelvic pain. Pt. Also reports that she has been have increased pain in the right upper quadrant with increasing gas, bloating and loose stools. She states she has been advised by her PCP to see a GI doctor however has not followed through with the advice. She denies any urinary symptoms. Patient's last menstrual period was 04/17/2013. .    ROS: A comprehensive review of systems was negative except for that written in the HPI. OBJECTIVE:     Visit Vitals  BP (!) 158/95 (BP 1 Location: Left arm, BP Patient Position: Sitting)   Pulse 92   Temp 97.4 °F (36.3 °C) (Oral)   Resp 20   Ht 5' 3\" (1.6 m)   Wt 285 lb 6.4 oz (129.5 kg)   LMP 04/17/2013   BMI 50.56 kg/m²         General:  alert, cooperative, no distress, appears stated age   Skin:  Normal.   Abdomen: soft, non-tender. Bowel sounds normal. No masses,  no organomegaly   Back:  Costovertebral angle tenderness absent   Genitourinary: External genitalia: normal general appearance  Urinary system: urethral meatus normal  Vaginal: normal mucosa without prolapse or lesions and discharge,   Cervix: absent  Adnexa: normal bimanual exam  Uterus: absent   Extremities:  extremities normal, atraumatic, no cyanosis or edema   Neurologic:  negative   Psychiatric:  non focal       ASSESSMENT:      ICD-10-CM ICD-9-CM    1. Vaginal discharge N89.8 623.5 NUSWAB VAGINITIS PLUS   2. Vaginal irritation N89.8 623.9 NUSWAB VAGINITIS PLUS   3. Screening examination for STD (sexually transmitted disease) Z11.3 V74.5 NUSWAB VAGINITIS PLUS     Plan:  Nuswab plus taken. Refer to Dr. Dayton Carrero services for evaluation of abdominal symptoms.   F/u with test results when they return. Encourage condom use if sexually active. Pt. Voices understanding of treatment plan. Follow-up Disposition:  Return if symptoms worsen or fail to improve.

## 2018-10-30 NOTE — PROGRESS NOTES
Pt complains of sharp pain from the stomach going around to her buttocks. Pt states the pain has been off/on for the past three months. Pt complains of being \"gassy. \"    Pt requests STD testing, pt states she had unprotected sex.     Pt Bp elevated 158/95, pt states she took BP med     PHQ over the last two weeks 10/30/2018   Little interest or pleasure in doing things Several days   Feeling down, depressed, irritable, or hopeless Several days   Total Score PHQ 2 2

## 2018-11-08 ENCOUNTER — TELEPHONE (OUTPATIENT)
Dept: OBGYN CLINIC | Age: 38
End: 2018-11-08

## 2018-11-08 LAB
A VAGINAE DNA VAG QL NAA+PROBE: ABNORMAL SCORE
BVAB2 DNA VAG QL NAA+PROBE: ABNORMAL SCORE
C ALBICANS DNA VAG QL NAA+PROBE: NEGATIVE
C GLABRATA DNA VAG QL NAA+PROBE: POSITIVE
C TRACH RRNA SPEC QL NAA+PROBE: NEGATIVE
MEGA1 DNA VAG QL NAA+PROBE: ABNORMAL SCORE
N GONORRHOEA RRNA SPEC QL NAA+PROBE: NEGATIVE
T VAGINALIS RRNA SPEC QL NAA+PROBE: NEGATIVE

## 2018-11-08 RX ORDER — TERCONAZOLE 4 MG/G
1 CREAM VAGINAL
Qty: 45 G | Refills: 0 | Status: SHIPPED | OUTPATIENT
Start: 2018-11-08 | End: 2020-06-26

## 2018-11-08 NOTE — PROGRESS NOTES
Viewed by Kim Arroyo on 11/8/2018  9:18 AM   Written by Jv Mcfarlane NP on 11/8/2018  9:15 AM   Your vaginal sample for infection has returned showing you are positive for a yeast infection.  You were negative for bacterial vaginosis, Trichomoniasis, gonorrhea and chlamydia.  Medication will be sent to your pharmacy.

## 2019-01-22 ENCOUNTER — OFFICE VISIT (OUTPATIENT)
Dept: SURGERY | Age: 39
End: 2019-01-22

## 2019-01-22 VITALS
HEIGHT: 63 IN | OXYGEN SATURATION: 98 % | SYSTOLIC BLOOD PRESSURE: 149 MMHG | HEART RATE: 95 BPM | TEMPERATURE: 98.7 F | DIASTOLIC BLOOD PRESSURE: 88 MMHG | BODY MASS INDEX: 50.5 KG/M2 | RESPIRATION RATE: 18 BRPM | WEIGHT: 285 LBS

## 2019-01-22 DIAGNOSIS — R10.11 RUQ ABDOMINAL PAIN: Primary | ICD-10-CM

## 2019-01-22 NOTE — PROGRESS NOTES
1. Have you been to the ER, urgent care clinic since your last visit? Hospitalized since your last visit? No    2. Have you seen or consulted any other health care providers outside of the [address] [place] [address] since your last visit? Include any pap smears or colon screening.  No

## 2019-01-28 PROBLEM — R10.11 RUQ ABDOMINAL PAIN: Status: ACTIVE | Noted: 2019-01-28

## 2019-01-28 NOTE — PATIENT INSTRUCTIONS

## 2019-01-28 NOTE — PROGRESS NOTES
Surgery Consult    Subjective:      Zarina Mae is a 34 y.o. female who is being seen for evaluation of abdominal pain. The pain is located in the RUQ with radiation to R back. Pain is described as dull and aching and measures 6/10 in intensity. Onset of pain was several months ago. Aggravating factors include fatty foods. Alleviating factors include light diet. Associated symptoms include nausea. She denies emesis, fever, chills, GERD symptoms, jaundice, diarrhea/change in bowel habits, or dysuria.     Patient Active Problem List    Diagnosis Date Noted    RUQ abdominal pain 2019    Diabetes mellitus type 2, controlled ([address] 2016    Hypertension 2016    Heel spur 2013    History of hysterectomy including cervix 2013    Morbid obesity ([address] 2013    S/P endometrial ablation 2013     Past Medical History:   Diagnosis Date    Arthritis      in hands    Diabetes ([address]     prediabetic    Hypertension     Morbid obesity ([address]     Psychiatric disorder     depression    Unspecified sleep apnea       Past Surgical History:   Procedure Laterality Date    CARDIAC SURG PROCEDURE UNLIST  2009    cardian cath    HX CARPAL TUNNEL RELEASE      both hands,    HX GYN      novasure in     HX GYN  13    ROBOTIC ASSISTED TOTAL LAPAROSCOPIC HYSTERECTOMY      Social History     Tobacco Use    Smoking status: Former Smoker     Last attempt to quit: 2016     Years since quittin.6    Smokeless tobacco: Never Used   Substance Use Topics    Alcohol use: Yes     Comment: social      Family History   Problem Relation Age of Onset    Heart Disease Father     Cancer Maternal Grandmother         breast cancer    Breast Cancer Maternal Grandmother 61    Hypertension Paternal Grandmother     Cancer Paternal Grandmother         breast    Lupus Sister       Current Outpatient Medications   Medication Sig    terconazole (TERAZOL 7) 0.4 % vaginal cream Insert 1 Applicator into vagina nightly.  mometasone (NASONEX) 50 mcg/actuation nasal spray Nasonex 50 mcg/actuation Spray    LYRICA 75 mg capsule 75 mg daily.  COLCRYS 0.6 mg tablet     HYDROcodone-acetaminophen (NORCO) 5-325 mg per tablet Take  by mouth. prn    terconazole (TERAZOL 7) 0.4 % vaginal cream Insert 1 Applicator into vagina nightly.  pregabalin (LYRICA) 50 mg capsule Take  by mouth.  hydrochlorothiazide (HYDRODIURIL) 25 mg tablet Take 25 mg by mouth daily.  sitaGLIPtin (JANUVIA) 50 mg tablet Take 50 mg by mouth daily.  esomeprazole (NEXIUM) 40 mg capsule Take 40 mg by mouth daily.  buPROPion XL (WELLBUTRIN XL) 150 mg tablet Take 150 mg by mouth every morning. Indications: DEPRESSION ASSOCIATED WITH MANIC DEPRESSIVE DISORDER    DULoxetine (CYMBALTA) 30 mg capsule Take 30 mg by mouth daily.  metoprolol (LOPRESSOR) 50 mg tablet Take  by mouth two (2) times a day.  ibuprofen (MOTRIN) 200 mg tablet Take 3 Tabs by mouth two (2) times a day.  traZODone (DESYREL) 50 mg tablet Take 50 mg by mouth nightly. No current facility-administered medications for this visit. Allergies   Allergen Reactions    Latex Rash       Review of Systems:    A complete review of systems was negative except as noted in the HPI. Objective:        Visit Vitals  /88 (BP 1 Location: Left arm, BP Patient Position: Sitting)   Pulse 95   Temp 98.7 °F (37.1 °C) (Oral)   Resp 18   Ht 5' 3\" (1.6 m)   Wt 285 lb (129.3 kg)   LMP 04/17/2013   SpO2 98%   BMI 50.49 kg/m²       Physical Exam:  GENERAL: alert, cooperative, no distress, appears stated age, morbidly obese, EYE: negative, LYMPH NODES: Cervical, supraclavicular nodes normal. THROAT & NECK: normal, LUNG: clear to auscultation bilaterally, HEART: regular rate and rhythm, S1, S2 normal, no murmur. ABDOMEN: Obese, non-distended, soft. RUQ pain with deep palpation; no mass or guarding.  EXTREMITIES:  extremities normal, atraumatic, no cyanosis or edema, SKIN: Normal., NEUROLOGIC: negative    Assessment:     Abdominal pain, suspect biliary pathology; gastroparesis also in differential diagnosis (patient is diabetic). Plan:     1. I recommend proceeding with abdominal ultrasound. 2. Follow-up after above.         Signed By: Jimbo Tyler MD     January 28, 2019

## 2019-02-12 ENCOUNTER — HOSPITAL ENCOUNTER (OUTPATIENT)
Dept: ULTRASOUND IMAGING | Age: 39
Discharge: HOME | End: 2019-02-12
Attending: SURGERY
Payer: MEDICARE

## 2019-02-12 DIAGNOSIS — R10.11 RUQ ABDOMINAL PAIN: ICD-10-CM

## 2019-02-12 PROCEDURE — 76700 US EXAM ABDOM COMPLETE: CPT

## 2019-03-05 ENCOUNTER — DOCUMENTATION ONLY (OUTPATIENT)
Dept: SLEEP MEDICINE | Age: 39
End: 2019-03-05

## 2019-03-05 ENCOUNTER — OFFICE VISIT (OUTPATIENT)
Dept: SLEEP MEDICINE | Age: 39
End: 2019-03-05

## 2019-03-05 VITALS
OXYGEN SATURATION: 96 % | HEIGHT: 63 IN | HEART RATE: 106 BPM | SYSTOLIC BLOOD PRESSURE: 163 MMHG | BODY MASS INDEX: 50.68 KG/M2 | WEIGHT: 286 LBS | DIASTOLIC BLOOD PRESSURE: 95 MMHG

## 2019-03-05 DIAGNOSIS — I10 ESSENTIAL HYPERTENSION: ICD-10-CM

## 2019-03-05 DIAGNOSIS — Z86.59 H/O: DEPRESSION: ICD-10-CM

## 2019-03-05 DIAGNOSIS — G47.33 OSA (OBSTRUCTIVE SLEEP APNEA): Primary | ICD-10-CM

## 2019-03-05 NOTE — PATIENT INSTRUCTIONS
[address] [place], [address]  Tel.  [phone]  Fax. 100 [address], [address] [facility]  Tel.  [phone]  Fax. [phone] [address]  Tel.  [phone]  Fax. [phone]     Learning About CPAP for Sleep Apnea  What is CPAP? CPAP is a small machine that you use at home every night while you sleep. It increases air pressure in your throat to keep your airway open. When you have sleep apnea, this can help you sleep better so you feel much better. CPAP stands for \"continuous positive airway pressure. \"  The CPAP machine will have one of the following:  · A mask that covers your nose and mouth  · Prongs that fit into your nose  · A mask that covers your nose only, the most common type. This type is called NCPAP. The N stands for \"nasal.\"  Why is it done? CPAP is usually the best treatment for obstructive sleep apnea. It is the first treatment choice and the most widely used. Your doctor may suggest CPAP if you have:  · Moderate to severe sleep apnea. · Sleep apnea and coronary artery disease (CAD) or heart failure. How does it help? · CPAP can help you have more normal sleep, so you feel less sleepy and more alert during the daytime. · CPAP may help keep heart failure or other heart problems from getting worse. · NCPAP may help lower your blood pressure. · If you use CPAP, your bed partner may also sleep better because you are not snoring or restless. What are the side effects? Some people who use CPAP have:  · A dry or stuffy nose and a sore throat. · Irritated skin on the face. · Sore eyes. · Bloating. If you have any of these problems, work with your doctor to fix them. Here are some things you can try:  · Be sure the mask or nasal prongs fit well. · See if your doctor can adjust the pressure of your CPAP. · If your nose is dry, try a humidifier.   · If your nose is runny or stuffy, try decongestant medicine or a steroid nasal spray. If these things do not help, you might try a different type of machine. Some machines have air pressure that adjusts on its own. Others have air pressures that are different when you breathe in than when you breathe out. This may reduce discomfort caused by too much pressure in your nose. Where can you learn more? Go to [employer]  Enter Paulino Fritz in the search box to learn more about \"Learning About CPAP for Sleep Apnea. \"   © 4670-3772 Healthwise, Incorporated. Care instructions adapted under license by [facility] [employer] (which disclaims liability or warranty for this information). This care instruction is for use with your licensed healthcare professional. If you have questions about a medical condition or this instruction, always ask your healthcare professional. [address] any warranty or liability for your use of this information. Content Version: 5.2.32876; Last Revised: January 11, 2010  PROPER SLEEP HYGIENE    What to avoid  · Do not have drinks with caffeine, such as coffee or black tea, for 8 hours before bed. · Do not smoke or use other types of tobacco near bedtime. Nicotine is a stimulant and can keep you awake. · Avoid drinking alcohol late in the evening, because it can cause you to wake in the middle of the night. · Do not eat a big meal close to bedtime. If you are hungry, eat a light snack. · Do not drink a lot of water close to bedtime, because the need to urinate may wake you up during the night. · Do not read or watch TV in bed. Use the bed only for sleeping and sexual activity. What to try  · Go to bed at the same time every night, and wake up at the same time every morning. Do not take naps during the day. · Keep your bedroom quiet, dark, and cool. · Get regular exercise, but not within 3 to 4 hours of your bedtime. .  · Sleep on a comfortable pillow and mattress.   · If watching the clock makes you anxious, turn it facing away from you so you cannot see the time. · If you worry when you lie down, start a worry book. Well before bedtime, write down your worries, and then set the book and your concerns aside. · Try meditation or other relaxation techniques before you go to bed. · If you cannot fall asleep, get up and go to another room until you feel sleepy. Do something relaxing. Repeat your bedtime routine before you go to bed again. · Make your house quiet and calm about an hour before bedtime. Turn down the lights, turn off the TV, log off the computer, and turn down the volume on music. This can help you relax after a busy day. Drowsy Driving: The [employer] cites drowsiness as a causing factor in more than 059,178 police reported crashes annually, resulting in 76,000 injuries and 1,500 deaths. Other surveys suggest 55% of people polled have driven while drowsy in the past year, 23% had fallen asleep but not crashed, 3% crashed, and 2% had and accident due to drowsy driving. Who is at risk? Young Drivers: One study of drowsy driving accidents states that 55% of the drivers were under 25 years. Of those, 75% were male. Shift Workers and Travelers: People who work overnight or travel across time zones frequently are at higher risk of experiencing Circadian Rhythm Disorders. They are trying to work and function when their body is programed to sleep. Sleep Deprived: Lack of sleep has a serious impact on your ability to pay attention or focus on a task. Consistently getting less than the average of 8 hours your body needs creates partial or cumulative sleep deprivation. Untreated Sleep Disorders: Sleep Apnea, Narcolepsy, R.L.S., and other sleep disorders (untreated) prevent a person from getting enough restful sleep. This leads to excessive daytime sleepiness and increases the risk for drowsy driving accidents by up to 7 times.   Medications / Alcohol: Even over the counter medications can cause drowsiness. Medications that impair a drivers attention should have a warning label. Alcohol naturally makes you sleepy and on its own can cause accidents. Combined with excessive drowsiness its effects are amplified. Signs of Drowsy Driving:   * You don't remember driving the last few miles   * You may drift out of your darcy   * You are unable to focus and your thoughts wander   * You may yawn more often than normal   * You have difficulty keeping your eyes open / nodding off   * Missing traffic signs, speeding, or tailgating  Prevention-   Good sleep hygiene, lifestyle and behavioral choices have the most impact on drowsy driving. There is no substitute for sleep and the average person requires 8 hours nightly. If you find yourself driving drowsy, stop and sleep. Consider the sleep hygiene tips provided during your visit as well. Medication Refill Policy: Refills for all medications require 1 week advance notice. Please have your pharmacy fax a refill request. We are unable to fax, or call in \"controled substance\" medications and you will need to pick these prescriptions up from our office. [employer] Activation    Thank you for requesting access to [employer]. Please follow the instructions below to securely access and download your online medical record. [employer] allows you to send messages to your doctor, view your test results, renew your prescriptions, schedule appointments, and more. How Do I Sign Up? 1. In your internet browser, go to [email or web address]. 2. Click on the First Time User? Click Here link in the Sign In box. You will see the New Member Sign Up page. 3. Enter your [employer] Access Code exactly as it appears below. You will not need to use this code after youve completed the sign-up process. If you do not sign up before the expiration date, you must request a new code. [employer] Access Code:  Activation code not generated  Current [employer] Status: Active (This is the date your [employer] access code will )    4. Enter the last four digits of your Social Security Number (xxxx) and Date of Birth (mm/dd/yyyy) as indicated and click Submit. You will be taken to the next sign-up page. 5. Create a Oct[employer] ID. This will be your [employer] login ID and cannot be changed, so think of one that is secure and easy to remember. 6. Create a [employer] password. You can change your password at any time. 7. Enter your Password Reset Question and Answer. This can be used at a later time if you forget your password. 8. Enter your e-mail address. You will receive e-mail notification when new information is available in [address] 9. Click Sign Up. You can now view and download portions of your medical record. 10. Click the Download Summary menu link to download a portable copy of your medical information. Additional Information    If you have questions, please call [phone]. Remember, [employer] is NOT to be used for urgent needs. For medical emergencies, dial 911.

## 2019-03-05 NOTE — PROGRESS NOTES
[address] [facility]. [place], [address]  Tel.  [phone]  Fax. 100 [facility] [address]  [place], [address] [facility]  Tel.  [phone]  Fax. [phone] [address] [facility] ([place], [place]) [facility] [facility]  Tel.  [phone]  Fax. [phone]     S>Bianca Langford is a 34 y.o. female seen for a positive airway pressure follow-up. She reports no problems using the device. She is 97% compliant over the past 90 days. The following problems are identified:    Drowsiness no Problems exhaling no   Snoring no Forget to put on no   Mask Comfortable yes Can't fall asleep no   Dry Mouth no Mask falls off no   Air Leaking no Frequent awakenings no         She admits that her sleep has improved on PAP therapy using nasal pillows mask and heated tubing. Allergies   Allergen Reactions    Latex Rash       She has a current medication list which includes the following prescription(s): terconazole, mometasone, lyrica, colcrys, hydrocodone-acetaminophen, terconazole, pregabalin, hydrochlorothiazide, sitagliptin, ibuprofen, esomeprazole, bupropion xl, metoprolol tartrate, and trazodone. .      She  has a past medical history of Arthritis, Diabetes ([address] Hypertension, Morbid obesity ([facility] [address] Psychiatric disorder, and Unspecified sleep apnea. [place] Sleepiness Score: 9   and Modified F.O.S.Q. Score Total / 2: 13.5   which reflect improved sleep quality over therapy time.     O>    Visit Vitals  BP (!) 163/95   Pulse (!) 106   Ht 5' 3\" (1.6 m)   Wt 286 lb (129.7 kg)   LMP 04/17/2013   SpO2 96%   BMI 50.66 kg/m²         General:   Not in acute distress   Eyes:  Anicteric sclerae, no obvious strabismus   Nose:  No obvious nasal septum deviation    Oropharynx:   Class 4 oropharyngeal outlet, thick tongue base, uvula not seen due to low-lying soft palate, narrow tonsilo-pharyngeal pilars   Tonsils:   tonsils are not visualized due to low-lying soft palate   Neck:   midline trachea   Chest/Lungs: Equal lung expansion, clear on auscultation    CVS:  Normal rate, regular rhythm; no JVD   Skin:  Warm to touch; no obvious rashes   Neuro:  No focal deficits ; no obvious tremor    Psych:  Normal affect,  normal countenance;           A>    ICD-10-CM ICD-9-CM    1. RILEY (obstructive sleep apnea) G47.33 327.23 AMB SUPPLY ORDER   2. BMI 50.0-59.9, adult ([facility] Z68.43 V85.43    3. Essential hypertension I10 401.9    4. H/O: depression Z86.59 V11.8      AHI = 48.2 (2016). On Resmed :  APAP 6 - 20 cmH2O. Compliant:      yes    Therapeutic Response:  Positive    P>    * We have recommended a dedicated weight loss through appropriate diet and an exercise regiment as significant weight reduction has been shown to reduce severity of obstructive sleep apnea. * Follow-up Disposition:  Return in about 1 year (around 3/5/2020), or if symptoms worsen or fail to improve. * She was asked to contact our office for any problems regarding PAP therapy. * Counseling was provided regarding the importance of regular PAP use and on proper sleep hygiene and safe driving. * Re-enforced proper and regular cleaning for the device. Thank you for allowing us to participate in your patient's medical care. Anahy Hernandez MD, FAASM  Electronically signed.  03/05/19

## 2019-07-16 ENCOUNTER — HOSPITAL ENCOUNTER (OUTPATIENT)
Dept: MAMMOGRAPHY | Age: 39
Discharge: HOME | End: 2019-07-16
Attending: FAMILY MEDICINE
Payer: MEDICARE

## 2019-07-16 DIAGNOSIS — Z12.39 BREAST SCREENING, UNSPECIFIED: ICD-10-CM

## 2019-07-16 PROCEDURE — 77067 SCR MAMMO BI INCL CAD: CPT

## 2020-02-19 ENCOUNTER — TELEPHONE (OUTPATIENT)
Dept: SLEEP MEDICINE | Age: 40
End: 2020-02-19

## 2020-02-19 DIAGNOSIS — G47.33 OSA (OBSTRUCTIVE SLEEP APNEA): Primary | ICD-10-CM

## 2020-02-19 NOTE — TELEPHONE ENCOUNTER
Orders Placed This Encounter    AMB SUPPLY ORDER     Diagnosis: (G47.33) RILEY (obstructive sleep apnea)  (primary encounter diagnosis)     Replacement Supplies for Positive Airway Pressure Therapy Device:   Duration of need: 99 months.  Nasal Pillows Combo Mask (Replace) 2 per month.  Nasal Pillows (Replace) 2 per month.  Full Face Mask 1 every 3 months.  Full Face Mask Cushion 1 per month.  Nasal Cushion (Replace) 2 per month.  Nasal Interface Mask 1 every 3 months.  Headgear 1 every 6 months.  Chinstrap 1 every 6 months.  Tubing 1 every 3 months.  Tubing with heating element 1 every 3 months.  Filter(s) Disposable 2 per month.  Filter(s) Non-Disposable 1 every 6 months. .   [address] [facility] for Humidifier (Replace) 1 every 6 months. Khloe Flores MD, FAASM; NPI: [id number]    Electronically signed.  Date:- 02/19/20

## 2020-02-20 ENCOUNTER — DOCUMENTATION ONLY (OUTPATIENT)
Dept: SLEEP MEDICINE | Age: 40
End: 2020-02-20

## 2020-02-21 ENCOUNTER — DOCUMENTATION ONLY (OUTPATIENT)
Dept: SLEEP MEDICINE | Age: 40
End: 2020-02-21

## 2020-02-21 ENCOUNTER — TELEPHONE (OUTPATIENT)
Dept: SLEEP MEDICINE | Age: 40
End: 2020-02-21

## 2020-03-05 ENCOUNTER — DOCUMENTATION ONLY (OUTPATIENT)
Dept: SLEEP MEDICINE | Age: 40
End: 2020-03-05

## 2020-03-05 ENCOUNTER — OFFICE VISIT (OUTPATIENT)
Dept: SLEEP MEDICINE | Age: 40
End: 2020-03-05

## 2020-03-05 VITALS
DIASTOLIC BLOOD PRESSURE: 90 MMHG | WEIGHT: 285.6 LBS | SYSTOLIC BLOOD PRESSURE: 157 MMHG | OXYGEN SATURATION: 98 % | HEART RATE: 104 BPM | HEIGHT: 63 IN | TEMPERATURE: 97.1 F | BODY MASS INDEX: 50.61 KG/M2

## 2020-03-05 DIAGNOSIS — I10 ESSENTIAL HYPERTENSION: ICD-10-CM

## 2020-03-05 DIAGNOSIS — G47.33 OSA (OBSTRUCTIVE SLEEP APNEA): Primary | ICD-10-CM

## 2020-03-05 DIAGNOSIS — Z86.59 H/O: DEPRESSION: ICD-10-CM

## 2020-03-05 NOTE — PROGRESS NOTES
[address] [facility] [place]. [place], [address]  Tel.  [phone]  Fax. 100 [facility] 60  [place], [address] [facility]  Tel.  [phone]  Fax. [phone] [address]  Tel.  [phone]  Fax. [phone]     Mauro Myers is a 37 y.o. female seen for a positive airway pressure follow-up. She reports no problems using the device. She is 100% compliant over the past 30 days. The following problems are identified:    Drowsiness no Problems exhaling no   Snoring no Forget to put on no   Mask Comfortable yes Can't fall asleep no   Dry Mouth no Mask falls off no   Air Leaking no Frequent awakenings no         She admits that her sleep has improved on PAP therapy using nasal pillows mask and heated tubing. She reports of a recent machine malfunction (device would not start). She has slept poorly without PAP therapy and is interested in getting back on therapy as soon as possible. Allergies   Allergen Reactions    Latex Rash       She has a current medication list which includes the following prescription(s): terconazole, mometasone, lyrica, colcrys, hydrocodone-acetaminophen, terconazole, pregabalin, hydrochlorothiazide, sitagliptin, ibuprofen, trazodone, esomeprazole, bupropion xl, and metoprolol tartrate. .      She  has a past medical history of Arthritis, Diabetes ([address] Hypertension, Morbid obesity ([address] Psychiatric disorder, and Unspecified sleep apnea. [place] Sleepiness Score: 6   and Modified F.O.S.Q. Score Total / 2: 14.5   which reflect improved sleep quality over therapy time.     O>    Visit Vitals  /90   Pulse (!) 104   Temp 97.1 °F (36.2 °C)   Ht 5' 3\" (1.6 m)   Wt 285 lb 9.6 oz (129.5 kg)   LMP 04/17/2013   SpO2 98%   BMI 50.59 kg/m²         General:   Not in acute distress   Eyes:  Anicteric sclerae, no obvious strabismus   Nose:  No obvious nasal septum deviation    Oropharynx:   Class 4 oropharyngeal outlet, thick tongue base, uvula not seen due to low-lying soft palate, narrow tonsilo-pharyngeal pilars   Tonsils:   tonsils are not visualized due to low-lying soft palate   Neck:   midline trachea   Chest/Lungs:  Equal lung expansion, clear on auscultation    CVS:  Normal rate, regular rhythm; no JVD   Skin:  Warm to touch; no obvious rashes   Neuro:  No focal deficits ; no obvious tremor    Psych:  Normal affect,  normal countenance;           A>    ICD-10-CM ICD-9-CM    1. RILEY (obstructive sleep apnea) G47.33 327.23 AMB SUPPLY ORDER   2. BMI 50.0-59.9, adult ([facility] Z68.43 V85.43    3. Essential hypertension I10 401.9    4. H/O: depression Z86.59 V11.8      AHI = 48.2 (2016). On Resmed :  APAP 6 - 20 cmH2O. Compliant:      yes    Therapeutic Response:  Positive    P>    * Patient is using her PAP device regularly and benefiting form therapy,  continued use of the device at 12 cmH2O is advised. Orders Placed This Encounter    AMB SUPPLY ORDER     Diagnosis: Sleep Apnea ICD-10 Code (G47.30); ICD-9 Code (780.57). Positive Airway Pressure Therapy: Duration of need: 99 months. ResMed Device with Heated Humidifer [id number] / . Set Pressure: 12 cmH2O    Service and Repair patient PAP device. Replace if damaged beyond repair.  Nasal Pillows Combo Mask (Replace) 2 per month.  Nasal Pillows (Replace) 2 per month.  Headgear 1 every 6 months.  Chinstrap 1 every 6 months.  Tubing with heating element 1 every 3 months.  Filter(s) Disposable 2 per month.  Filter(s) Non-Disposable 1 every 6 months. Perform Mask Fitting per patient preference and comfort - replace as above. Nataly Keller MD, FAASM; NPI: [id number]  Electronically signed. 03/05/20       * She is familiar with the telephone monitoring application, is willing to track therapy and agrees to notify use if AHI is >5 per hour.     * She is aware of the relationship between RILEY and HTN which is stable as is her mood (patient is currently on anti-depressant therapy). * We have recommended a dedicated weight loss through appropriate diet and an exercise regiment as significant weight reduction has been shown to reduce severity of obstructive sleep apnea. *   Follow-up and Dispositions    · Return in about 1 year (around 3/5/2021), or if symptoms worsen or fail to improve. * She was asked to contact our office for any problems regarding PAP therapy. * Counseling was provided regarding the importance of regular PAP use and on proper sleep hygiene and safe driving. * Re-enforced proper and regular cleaning for the device. Thank you for allowing us to participate in your patient's medical care. Karli Geiger MD, FAASM  Electronically signed.  03/05/20

## 2020-03-05 NOTE — PATIENT INSTRUCTIONS
[address] [place]. [place], [address]  Tel.  [phone]  Fax. [address] [facility] [address]  [place], [address] [facility]  Tel.  [phone]  Fax. [phone] [address]  Tel.  [phone]  Fax. [phone]     Learning About CPAP for Sleep Apnea  What is CPAP? CPAP is a small machine that you use at home every night while you sleep. It increases air pressure in your throat to keep your airway open. When you have sleep apnea, this can help you sleep better so you feel much better. CPAP stands for \"continuous positive airway pressure. \"  The CPAP machine will have one of the following:  · A mask that covers your nose and mouth  · Prongs that fit into your nose  · A mask that covers your nose only, the most common type. This type is called NCPAP. The N stands for \"nasal.\"  Why is it done? CPAP is usually the best treatment for obstructive sleep apnea. It is the first treatment choice and the most widely used. Your doctor may suggest CPAP if you have:  · Moderate to severe sleep apnea. · Sleep apnea and coronary artery disease (CAD) or heart failure. How does it help? · CPAP can help you have more normal sleep, so you feel less sleepy and more alert during the daytime. · CPAP may help keep heart failure or other heart problems from getting worse. · NCPAP may help lower your blood pressure. · If you use CPAP, your bed partner may also sleep better because you are not snoring or restless. What are the side effects? Some people who use CPAP have:  · A dry or stuffy nose and a sore throat. · Irritated skin on the face. · Sore eyes. · Bloating. If you have any of these problems, work with your doctor to fix them. Here are some things you can try:  · Be sure the mask or nasal prongs fit well. · See if your doctor can adjust the pressure of your CPAP. · If your nose is dry, try a humidifier.   · If your nose is runny or stuffy, try decongestant medicine or a steroid nasal spray. If these things do not help, you might try a different type of machine. Some machines have air pressure that adjusts on its own. Others have air pressures that are different when you breathe in than when you breathe out. This may reduce discomfort caused by too much pressure in your nose. Where can you learn more? Go to [employer]  Enter Becca Else in the search box to learn more about \"Learning About CPAP for Sleep Apnea. \"   © 4597-0724 Healthwise, Incorporated. Care instructions adapted under license by [facility] [employer] (which disclaims liability or warranty for this information). This care instruction is for use with your licensed healthcare professional. If you have questions about a medical condition or this instruction, always ask your healthcare professional. [address] any warranty or liability for your use of this information. Content Version: 4.4.09149; Last Revised: January 11, 2010  PROPER SLEEP HYGIENE    What to avoid  · Do not have drinks with caffeine, such as coffee or black tea, for 8 hours before bed. · Do not smoke or use other types of tobacco near bedtime. Nicotine is a stimulant and can keep you awake. · Avoid drinking alcohol late in the evening, because it can cause you to wake in the middle of the night. · Do not eat a big meal close to bedtime. If you are hungry, eat a light snack. · Do not drink a lot of water close to bedtime, because the need to urinate may wake you up during the night. · Do not read or watch TV in bed. Use the bed only for sleeping and sexual activity. What to try  · Go to bed at the same time every night, and wake up at the same time every morning. Do not take naps during the day. · Keep your bedroom quiet, dark, and cool. · Get regular exercise, but not within 3 to 4 hours of your bedtime. .  · Sleep on a comfortable pillow and mattress.   · If watching the clock makes you anxious, turn it facing away from you so you cannot see the time. · If you worry when you lie down, start a worry book. Well before bedtime, write down your worries, and then set the book and your concerns aside. · Try meditation or other relaxation techniques before you go to bed. · If you cannot fall asleep, get up and go to another room until you feel sleepy. Do something relaxing. Repeat your bedtime routine before you go to bed again. · Make your house quiet and calm about an hour before bedtime. Turn down the lights, turn off the TV, log off the computer, and turn down the volume on music. This can help you relax after a busy day. Drowsy Driving: The [employer] cites drowsiness as a causing factor in more than 265,169 police reported crashes annually, resulting in 76,000 injuries and 1,500 deaths. Other surveys suggest 55% of people polled have driven while drowsy in the past year, 23% had fallen asleep but not crashed, 3% crashed, and 2% had and accident due to drowsy driving. Who is at risk? Young Drivers: One study of drowsy driving accidents states that 55% of the drivers were under 25 years. Of those, 75% were male. Shift Workers and Travelers: People who work overnight or travel across time zones frequently are at higher risk of experiencing Circadian Rhythm Disorders. They are trying to work and function when their body is programed to sleep. Sleep Deprived: Lack of sleep has a serious impact on your ability to pay attention or focus on a task. Consistently getting less than the average of 8 hours your body needs creates partial or cumulative sleep deprivation. Untreated Sleep Disorders: Sleep Apnea, Narcolepsy, R.L.S., and other sleep disorders (untreated) prevent a person from getting enough restful sleep. This leads to excessive daytime sleepiness and increases the risk for drowsy driving accidents by up to 7 times.   Medications / Alcohol: Even over the counter medications can cause drowsiness. Medications that impair a drivers attention should have a warning label. Alcohol naturally makes you sleepy and on its own can cause accidents. Combined with excessive drowsiness its effects are amplified. Signs of Drowsy Driving:   * You don't remember driving the last few miles   * You may drift out of your darcy   * You are unable to focus and your thoughts wander   * You may yawn more often than normal   * You have difficulty keeping your eyes open / nodding off   * Missing traffic signs, speeding, or tailgating  Prevention-   Good sleep hygiene, lifestyle and behavioral choices have the most impact on drowsy driving. There is no substitute for sleep and the average person requires 8 hours nightly. If you find yourself driving drowsy, stop and sleep. Consider the sleep hygiene tips provided during your visit as well. Medication Refill Policy: Refills for all medications require 1 week advance notice. Please have your pharmacy fax a refill request. We are unable to fax, or call in \"controled substance\" medications and you will need to pick these prescriptions up from our office. Spool Activation    Thank you for requesting access to Spool. Please follow the instructions below to securely access and download your online medical record. Spool allows you to send messages to your doctor, view your test results, renew your prescriptions, schedule appointments, and more. How Do I Sign Up? 1. In your internet browser, go to [email or web address]. 2. Click on the First Time User? Click Here link in the Sign In box. You will see the New Member Sign Up page. 3. Enter your Spool Access Code exactly as it appears below. You will not need to use this code after youve completed the sign-up process. If you do not sign up before the expiration date, you must request a new code. Spool Access Code:  Activation code not generated  Current [employer] Status: Active (This is the date your [employer] access code will )    4. Enter the last four digits of your Social Security Number (xxxx) and Date of Birth (mm/dd/yyyy) as indicated and click Submit. You will be taken to the next sign-up page. 5. Create a [employer] ID. This will be your [employer] login ID and cannot be changed, so think of one that is secure and easy to remember. 6. Create a [employer] password. You can change your password at any time. 7. Enter your Password Reset Question and Answer. This can be used at a later time if you forget your password. 8. Enter your e-mail address. You will receive e-mail notification when new information is available in [address] 9. Click Sign Up. You can now view and download portions of your medical record. 10. Click the Download Summary menu link to download a portable copy of your medical information. Additional Information    If you have questions, please call [phone]. Remember, [employer] is NOT to be used for urgent needs. For medical emergencies, dial 911.

## 2020-06-25 ENCOUNTER — TELEPHONE (OUTPATIENT)
Dept: RHEUMATOLOGY | Age: 40
End: 2020-06-25

## 2020-06-25 NOTE — TELEPHONE ENCOUNTER
I called and but could not confirm with the patient on 6/25/2020 for their next day 6/26/2020 appointment but left a voice message to have most recent medical records faxed over or to bring in at appt time. SDH.

## 2020-06-26 ENCOUNTER — OFFICE VISIT (OUTPATIENT)
Dept: RHEUMATOLOGY | Age: 40
End: 2020-06-26

## 2020-06-26 VITALS
DIASTOLIC BLOOD PRESSURE: 83 MMHG | HEIGHT: 63 IN | TEMPERATURE: 97.5 F | SYSTOLIC BLOOD PRESSURE: 129 MMHG | BODY MASS INDEX: 49.43 KG/M2 | RESPIRATION RATE: 18 BRPM | WEIGHT: 279 LBS | HEART RATE: 84 BPM

## 2020-06-26 DIAGNOSIS — E55.9 VITAMIN D DEFICIENCY: ICD-10-CM

## 2020-06-26 DIAGNOSIS — R20.2 PARESTHESIA OF BOTH LOWER EXTREMITIES: ICD-10-CM

## 2020-06-26 DIAGNOSIS — M06.9 RHEUMATOID ARTHRITIS WITH UNKNOWN RHEUMATOID FACTOR STATUS (HCC): Primary | ICD-10-CM

## 2020-06-26 DIAGNOSIS — R20.2 PARESTHESIA AND PAIN OF BOTH UPPER EXTREMITIES: ICD-10-CM

## 2020-06-26 DIAGNOSIS — Z87.39 HISTORY OF GOUT: ICD-10-CM

## 2020-06-26 DIAGNOSIS — M79.601 PARESTHESIA AND PAIN OF BOTH UPPER EXTREMITIES: ICD-10-CM

## 2020-06-26 DIAGNOSIS — M79.602 PARESTHESIA AND PAIN OF BOTH UPPER EXTREMITIES: ICD-10-CM

## 2020-06-26 RX ORDER — PREDNISONE 5 MG/1
TABLET ORAL
Qty: 91 TAB | Refills: 0 | Status: SHIPPED | OUTPATIENT
Start: 2020-06-26 | End: 2020-08-10

## 2020-06-26 NOTE — LETTER
6/26/20 Patient: Natalie Gallego YOB: 1980 Date of Visit: 6/26/2020 Sean Arvizu MD 
[address] [facility] 7 [address] VIA Facsimile: [phone] Dear Sean Arvizu MD, Thank you for referring Ms. Gabriella Singh to [facility] for evaluation. My notes for this consultation are attached. If you have questions, please do not hesitate to call me. I look forward to following your patient along with you.  
 
 
Sincerely, 
 
Ciaran Walsh MD

## 2020-06-26 NOTE — PATIENT INSTRUCTIONS
I will start you on a short course of prednisone, called a prednisone taper, with 5 mg tablets. This regimen is to be taken as follows:      - 4 tablets (20 mg), all at once, daily for 7 days   - 3 tablets (15 mg), all at once, daily for 7 days   - 2 tablets (10 mg), all at once, daily for 14 days   - 1 tablet (5 mg), daily for 14 days   - then STOP    Let me know how you feel on the prednisone after 7 days of taking it.     I referred you to neurology

## 2020-06-26 NOTE — PROGRESS NOTES
REASON FOR VISIT    This is the initial evaluation for Ms. Elizabeth Hines a 41 y.o.  female for question of an inflammatory arthritis. The patient is referred to the [facility] at the request of Dr. Sharda Bach. HISTORY OF PRESENT ILLNESS      I have reviewed and summarized old records from [facility], [facility], and [facility] ([facility])     In 2014, she developed pain and swelling in her hands associated with numbness  She was diagnosed with bilateral carpal tunnel syndrome diagnosed by nerve conduction study and EMG/NCS status post bilateral carpal tunnel releases at [address]. In 12/31/2019, she saw Dr. Niko Hidalgo. Bilateral Hand LEFT: These demonstrate mild arthritic change throughout the hand.  No significant acute findings. RIGHT: These demonstrate mild arthritic change throughout the hand.  No significant acute findings. She was prescribed a Medrol dose pack which did not help. Today, she reports burning and itching pain in her hands involving all her fingers associated with redness. She feels like her bone is sticking out. She feels that Lyrica may help it. Warmth helps. Her pain worsens as the day goes on. She has poor . She feels like her hands lock. She denies swelling in her hands and wrist. She endorses morning stiffness in her hands lasting an hour to hours. She reports having gout based on burning pain in her feet by her podiatry. She has taken colchicine which she feels helps a time. Therapy History includes:  Current DMARD therapy includes: none  Prior DMARD therapy includes: none  The following DMARDs have been ineffective: nonenon  The following DMARDs were stopped because of side effects: none    REVIEW OF SYSTEMS    A 15 point review of systems was performed and summarized below. The questionnaire was reviewed with the patient and scanned into the patient's medical record.     General: denies recent weight gain, recent weight loss, fatigue, weakness, fever, drenching night sweats  Musculoskeletal: endorses joint pain, morning stiffness (lasting 60 minutes), denies joint swelling,  muscle pain  Ears: endorses ringing in ears, denies hearing loss, deafness  Eyes: denies pain, light sensitive, redness, blindness, double vision, blurred vision, excess tearing, dryness, foreign body sensation  Mouth: denies sore tongue, oral ulcers, loss of taste, dryness, increased dental caries  Nose: denies nosebleeds, nasal ulcers  Throat: denies food stuck when swallowing, difficulty with swallowing, hoarseness, pain in jaw while chewing  Neck: denies swollen glands, tender glands  Cardiopulmonary: denies pain in chest with deep breaths, pain in chest when lying down, murmurs, sudden changes in heart beat, wheezing, dry cough, productive cough, shortness of breath at rest, shortness of breath on exertion, coughing of blood  Gastrointestinal: endorses heartburn, denies nausea, stomach pain relieved by food, chronic constipation, chronic diarrhea, blood in stools, black stools  Genitourinary: denies vaginal dryness, pain or burning on urination, blood in urine, cloudy urine, vaginal ulcers  Hematologic: denies anemia, bleeding tendency, blood clots, bleeding gums  Skin: denies easy bruising, hair loss, rash, rash worsened after sun exposure, hives/urticaria, skin thickening, skin tightness, nodules/bumps, color changes of hands or feet in the cold (Raynaud's)  Neurologic: denies numbness or tingling in hands, numbness or tingling in feet, muscle weakness  Psychiatric: endorses depression, denies excessive worries, PTSD, Bipolar  Sleep: endorses poor sleep (6 hours), snoring, apnea,, difficulty falling asleep, difficulty staying asleep denies daytime somnolence    PAST MEDICAL HISTORY    She has a past medical history of Arthritis, Back pain, Diabetes ([address] Gout, Hypertension, Morbid obesity ([address] Neuropathy, Psychiatric disorder, and Unspecified sleep apnea.     FAMILY HISTORY    Her family history includes Breast Cancer (age of onset: 61) in her maternal grandmother; Cancer in her maternal grandmother and paternal grandmother; Crohn's Disease in her sister; Heart Disease in her father; Hypertension in her paternal grandmother; Lupus in her sister and sister; No Known Problems in her mother. SOCIAL HISTORY    She reports that she quit smoking about 4 years ago. She has never used smokeless tobacco. She reports current alcohol use. She reports that she does not use drugs. HEALTH MAINTENANCE    Immunizations    There is no immunization history on file for this patient. MEDICATIONS    Current Outpatient Medications   Medication Sig Dispense Refill    predniSONE (DELTASONE) 5 mg tablet 4 tabs daily for 7 days, 3 tabs for 7 days, 2 tabs for 14 days, 1 tab for 14 days 91 Tab 0    mometasone (NASONEX) 50 mcg/actuation nasal spray Nasonex 50 mcg/actuation Spray      LYRICA 75 mg capsule 150 mg daily.  COLCRYS 0.6 mg tablet as needed.  hydrochlorothiazide (HYDRODIURIL) 25 mg tablet Take 25 mg by mouth daily.  sitaGLIPtin (JANUVIA) 50 mg tablet Take 50 mg by mouth daily.  traZODone (DESYREL) 50 mg tablet Take 50 mg by mouth nightly.  esomeprazole (NEXIUM) 40 mg capsule Take 40 mg by mouth daily.  buPROPion XL (WELLBUTRIN XL) 150 mg tablet Take 150 mg by mouth every morning. Indications: DEPRESSION ASSOCIATED WITH MANIC DEPRESSIVE DISORDER      metoprolol (LOPRESSOR) 50 mg tablet Take  by mouth two (2) times a day. ALLERGIES    Allergies   Allergen Reactions    Latex Rash       PHYSICAL EXAMINATION    Visit Vitals  /83   Pulse 84   Temp 97.5 °F (36.4 °C)   Resp 18   Ht 5' 3\" (1.6 m)   Wt 279 lb (126.6 kg)   BMI 49.42 kg/m²     Body mass index is 49.42 kg/m².     General: Patient is alert, oriented x 3, not in acute distress    HEENT:   Conjunctiva are not injected and appear moist, oral mucous membranes are moist, there are no ulcers present, there is no alopecia, neck is supple, there is no lymphadenopathy. Salivary glands are normal    Cardiovascular:  Heart is regular rate and rhythm, no murmurs. Chest:  Lungs are clear to auscultation bilaterally. Extremities:  Free of clubbing, cyanosis, edema, extremities well perfused. Neurological exam:  Muscle strength is full in upper and lower extremities. Skin exam:  There are no rashes, no tophi, no psoriasis, no active Raynaud's, no livedo reticularis, no periungual erythema. Musculoskeletal exam:  A comprehensive musculoskeletal exam was performed for all joints of each upper and lower extremity and assessed for swelling, tenderness and range of motion.  Pertinent results are documented as below:    Decreased passive extension of right wrist  Tenderness described as burning in interphalageal areas, PIPs and DIP  Bilateral MTP tenderness    Z-Deformities:   no  Wilmore Neck Deformities:  no  Boutonierre's Deformities:  no  Ulnar Deviation:   no  MCP Subluxation:  no    Joint Count 6/26/2020   MHAQ 0.5   Left wrist- Tender 1   Left wrist- Swollen 1   Left 1st MCP - Tender 1   Left 1st MCP - Swollen 0   Left 2nd MCP - Tender 1   Left 2nd MCP - Swollen 0   Left 3rd MCP - Tender 1   Left 3rd MCP - Swollen 0   Left 4th MCP - Tender 1   Left 4th MCP - Swollen 0   Left 5th MCP - Tender 1   Left 5th MCP - Swollen 0   Left thumb IP - Tender 0   Left thumb IP - Swollen 1   Left 2nd PIP - Tender 1   Left 2nd PIP - Swollen 0   Left 3rd PIP - Tender 1   Left 3rd PIP - Swollen 0   Left 4th PIP - Tender 1   Left 4th PIP - Swollen 0   Left 5th PIP - Tender 1   Left 5th PIP - Swollen 0   Right wrist- Tender 1   Right wrist- Swollen 1   Right 1st MCP - Tender 1   Right 1st MCP - Swollen 0   Right 2nd MCP - Tender 1   Right 2nd MCP - Swollen 0   Right 3rd MCP - Tender 1   Right 3rd MCP - Swollen 0   Right 4th MCP - Tender 1   Right 4th MCP - Swollen 0   Right 5th MCP - Tender 1   Right 5th MCP - Swollen 0   Right thumb IP - Tender 1   Right thumb IP - Swollen 0   Right 2nd PIP - Tender 1   Right 2nd PIP - Swollen 0   Right 3rd PIP - Tender 1   Right 3rd PIP - Swollen 0   Right 4th PIP - Tender 1   Right 4th PIP - Swollen 0   Right 5th PIP - Tender 1   Right 5th PIP - Swollen 0   Tender Joint Count (Total) 21   Swollen Joint Count (Total) 3     DATA REVIEW    Prior medical records were reviewed and are summarized as below:    Laboratory data: summarized in the HPI    Imaging: summarized in the HPI. ASSESSMENT AND PLAN    1) Suspected Rheumatoid Arthritis. She has bilateral wrist swelling and decreased ROM of right wrist, suspicious of radiocarpal arthritis, which would be a secondary pathology to injury or inflammatory arthritis. She denies injury. She has neuropathy which improves with Lyrica and was given a Medrol dose pack without benefit. She may have small fiber neuropathy as well. Her sisters have lupus. I ordered labs and radiographs    I will start her on a short course of prednisone, called a prednisone taper, with 5 mg tablets. This regimen is to be taken as follows: 4 tabs (20 mg) for 7 days, 3 tabs (15 mg) for 7 days, 2 tabs (10 mg) for 14 days and then 1 tab (5 mg) for 14 days, and then stop. I informed her to contact me after she completes 7 days of 20 mg to inform me of her response. I explained that she may not likely have any improvement of her burning pain. 2) Bilateral Hand and Foot Neuropathy. She reports burning pruritic pain in her hands and feet that is chronic. Lyrica helps. She has a history of carpal tunnel syndrome s/p release without relief but carpal tunnel syndrome would not involve the 5th digits. I referred her to neurology for further evaluation and testing. She may have small fiber neuropathy, so if her EMG/NCS is normal, I recommend a skin biopsy. I order vitamin B12 and B6.    ADDENDUM:    Vitamin B12 and folate    3) History of Gout.  This was diagnosed by her podiatrist based on burning and throbbing pain in her feet. She feels at times, pain in her big toe and when she takes colchicine, it helps. Colchicine does not treat gout. I will check her uric acid. ADDENDUM:    Uric acid 7.4 mg/dL    4) Vitamin D Deficiency. The patient's vitamin D level was 24.9. I prescribed weekly ergocalciferol 50,000. The patient voiced understanding of the aforementioned assessment and plan. Summary of plan was provided in the After Visit Summary patient instructions. I also provided education about MyChart setup and utility.     TODAY'S ORDERS    Orders Placed This Encounter    QUANTIFERON-TB GOLD PLUS    QUANTIFERON-TB GOLD PLUS    XR HAND RT MIN 3 V    XR HAND LT MIN 3 V    XR FOOT RT MIN 3 V    XR FOOT LT MIN 3 V    CYCLIC CITRUL PEPTIDE AB, IGG    CBC WITH AUTOMATED DIFF    CHRONIC HEPATITIS PANEL    METABOLIC PANEL, COMPREHENSIVE    C REACTIVE PROTEIN, QT    SED RATE (ESR)    RHEUMATOID FACTOR, QL    PROTEIN ELECTROPHORESIS W/ REFLX EMMA    URIC ACID    VITAMIN D, 25 HYDROXY    VITAMIN B12 & FOLATE    TSH REFLEX TO T4    VITAMIN B6    ANTINUCLEAR ANTIBODIES, IFA    SJOGREN'S ABS, SSA AND SSB    SMITH ANTIBODIES    T PALLIDUM SCREEN W/REFLEX    BETA-2 GLYCOPROTEIN I ABS    CARDIOLIPIN AB PANEL    COMPLEMENT, C3 & C4    COMPLEMENT, CH50, TOTAL    DSDNA ANTIBODY BY IFA, CRITHIDIA LUCILIAE, WITH REFLEX TO TITER    LUPUS ANTICOAGULANT PANEL W/ REFLEX    PROT+CREATU (RANDOM)    IMMUNOGLOBULINS, G/A/M, QT.    URINALYSIS W/ REFLEX CULTURE    COMPLEMENT, CH50, TOTAL    CBC WITH AUTOMATED DIFF    METABOLIC PANEL, COMPREHENSIVE    MICROSCOPIC EXAMINATION    PROTEIN ELECTROPHORESIS W/ REFLX EMMA    CHRONIC HEPATITIS PANEL    COMMENT    IMMUNOGLOBULINS, G/A/M, QT.    CARDIOLIPIN AB PANEL    BETA-2 GLYCOPROTEIN I ABS    VITAMIN B12 & FOLATE    VITAMIN B6    RHEUMATOID FACTOR, QL    VITAMIN D, 25 HYDROXY    T PALLIDUM SCREEN W/REFLEX  CYCLIC CITRUL PEPTIDE AB, IGG    TSH REFLEX TO T4    URIC ACID    SED RATE (ESR)    COMPLEMENT, CH50, TOTAL    C REACTIVE PROTEIN, QT    DSDNA ANTIBODY BY IFA, CAL LUCERIKE, WITH REFLEX TO TITER    [facility]    DIRECT SUSIE    predniSONE (DELTASONE) 5 mg tablet    ergocalciferol (ERGOCALCIFEROL) 1,250 mcg (50,000 unit) capsule       Future Appointments   Date Time Provider Florencio Deani   7/10/2020  2:20 PM Afsaneh Mar MD [facility]   10/26/2020 10:00 AM Fabian Jones MD [address] [facility]   3/5/2021 11:40 AM Vinicius Arellano MD [facility] MD [place], [address] [facility]    Adult Rheumatology   Rheumatology Ultrasound Certified  [facility]  A Part of [facility], [address] [place] [address]   Phone [phone]  Fax [phone]

## 2020-06-29 LAB
B2 GLYCOPROT1 IGA SER-ACNC: <9 GPI IGA UNITS (ref 0–25)
B2 GLYCOPROT1 IGG SER-ACNC: <9 GPI IGG UNITS (ref 0–20)
B2 GLYCOPROT1 IGM SER-ACNC: <9 GPI IGM UNITS (ref 0–32)
CARDIOLIPIN IGA SER IA-ACNC: <9 APL U/ML (ref 0–11)
CARDIOLIPIN IGG SER IA-ACNC: <9 GPL U/ML (ref 0–14)
CARDIOLIPIN IGM SER IA-ACNC: <9 MPL U/ML (ref 0–12)
CH50 SERPL-ACNC: >60 U/ML
CH50 SERPL-ACNC: >60 U/ML
DSDNA AB SER QL CLIF: NEGATIVE
IGA SERPL-MCNC: 375 MG/DL (ref 87–352)
IGG SERPL-MCNC: 1662 MG/DL (ref 586–1602)
IGM SERPL-MCNC: 103 MG/DL (ref 26–217)
TREPONEMA PALLIDUM IGG+IGM AB [PRESENCE] IN SERUM OR PLASMA BY IMMUNOASSAY: NON REACTIVE

## 2020-06-30 ENCOUNTER — PATIENT MESSAGE (OUTPATIENT)
Dept: RHEUMATOLOGY | Age: 40
End: 2020-06-30

## 2020-06-30 LAB
25(OH)D3+25(OH)D2 SERPL-MCNC: 24.9 NG/ML (ref 30–100)
ALBUMIN SERPL ELPH-MCNC: 3.7 G/DL (ref 2.9–4.4)
ALBUMIN SERPL-MCNC: 4.4 G/DL (ref 3.8–4.8)
ALBUMIN/GLOB SERPL: 0.9 {RATIO} (ref 0.7–1.7)
ALBUMIN/GLOB SERPL: 1.4 {RATIO} (ref 1.2–2.2)
ALP SERPL-CCNC: 73 IU/L (ref 39–117)
ALPHA1 GLOB SERPL ELPH-MCNC: 0.2 G/DL (ref 0–0.4)
ALPHA2 GLOB SERPL ELPH-MCNC: 0.7 G/DL (ref 0.4–1)
ALT SERPL-CCNC: 16 IU/L (ref 0–32)
ANA TITR SER IF: NEGATIVE {TITER}
APPEARANCE UR: CLEAR
AST SERPL-CCNC: 17 IU/L (ref 0–40)
B-GLOBULIN SERPL ELPH-MCNC: 1.5 G/DL (ref 0.7–1.3)
B2 GLYCOPROT1 IGA SER-ACNC: <9 GPI IGA UNITS (ref 0–25)
B2 GLYCOPROT1 IGG SER-ACNC: <9 GPI IGG UNITS (ref 0–20)
B2 GLYCOPROT1 IGM SER-ACNC: <9 GPI IGM UNITS (ref 0–32)
BACTERIA #/AREA URNS HPF: ABNORMAL /[HPF]
BASOPHILS # BLD AUTO: 0.1 X10E3/UL (ref 0–0.2)
BASOPHILS NFR BLD AUTO: 1 %
BILIRUB SERPL-MCNC: 0.2 MG/DL (ref 0–1.2)
BILIRUB UR QL STRIP: NEGATIVE
BUN SERPL-MCNC: 8 MG/DL (ref 6–24)
BUN/CREAT SERPL: 11 (ref 9–23)
C3 SERPL-MCNC: 212 MG/DL (ref 82–167)
C4 SERPL-MCNC: 48 MG/DL (ref 14–44)
CALCIUM SERPL-MCNC: 9.9 MG/DL (ref 8.7–10.2)
CARDIOLIPIN IGA SER IA-ACNC: <9 APL U/ML (ref 0–11)
CARDIOLIPIN IGG SER IA-ACNC: 10 GPL U/ML (ref 0–14)
CARDIOLIPIN IGM SER IA-ACNC: <9 MPL U/ML (ref 0–12)
CASTS URNS QL MICRO: ABNORMAL /LPF
CCP IGA+IGG SERPL IA-ACNC: 7 UNITS (ref 0–19)
CH50 SERPL-ACNC: >60 U/ML
CHLORIDE SERPL-SCNC: 99 MMOL/L (ref 96–106)
CO2 SERPL-SCNC: 20 MMOL/L (ref 20–29)
COLOR UR: YELLOW
COMMENT, 144067: NORMAL
CREAT SERPL-MCNC: 0.72 MG/DL (ref 0.57–1)
CREAT UR-MCNC: 170.2 MG/DL
CRP SERPL-MCNC: 8 MG/L (ref 0–10)
DAT POLY-SP REAG RBC QL: NEGATIVE
DSDNA AB SER QL CLIF: NEGATIVE
ENA SM AB SER-ACNC: <0.2 AI (ref 0–0.9)
ENA SS-A AB SER-ACNC: <0.2 AI (ref 0–0.9)
ENA SS-B AB SER-ACNC: <0.2 AI (ref 0–0.9)
EOSINOPHIL # BLD AUTO: 0.1 X10E3/UL (ref 0–0.4)
EOSINOPHIL NFR BLD AUTO: 1 %
EPI CELLS #/AREA URNS HPF: ABNORMAL /HPF (ref 0–10)
ERYTHROCYTE [DISTWIDTH] IN BLOOD BY AUTOMATED COUNT: 12.5 % (ref 11.7–15.4)
ERYTHROCYTE [SEDIMENTATION RATE] IN BLOOD BY WESTERGREN METHOD: 43 MM/HR (ref 0–32)
FOLATE SERPL-MCNC: 11.5 NG/ML
GAMMA GLOB SERPL ELPH-MCNC: 1.6 G/DL (ref 0.4–1.8)
GAMMA INTERFERON BACKGROUND BLD IA-ACNC: 0.09 IU/ML
GLOBULIN SER CALC-MCNC: 3.2 G/DL (ref 1.5–4.5)
GLOBULIN SER CALC-MCNC: 3.9 G/DL (ref 2.2–3.9)
GLUCOSE SERPL-MCNC: 150 MG/DL (ref 65–99)
GLUCOSE UR QL: NEGATIVE
HBV CORE AB SERPL QL IA: NEGATIVE
HBV CORE IGM SERPL QL IA: NEGATIVE
HBV E AB SERPL QL IA: NEGATIVE
HBV E AG SERPL QL IA: NEGATIVE
HBV SURFACE AB SER QL: NON REACTIVE
HBV SURFACE AG SERPL QL IA: NEGATIVE
HCT VFR BLD AUTO: 39.5 % (ref 34–46.6)
HCV AB S/CO SERPL IA: <0.1 S/CO RATIO (ref 0–0.9)
HGB BLD-MCNC: 13.6 G/DL (ref 11.1–15.9)
HGB UR QL STRIP: NEGATIVE
IGA SERPL-MCNC: 379 MG/DL (ref 87–352)
IGG SERPL-MCNC: 1758 MG/DL (ref 586–1602)
IGM SERPL-MCNC: 97 MG/DL (ref 26–217)
IMM GRANULOCYTES # BLD AUTO: 0 X10E3/UL (ref 0–0.1)
IMM GRANULOCYTES NFR BLD AUTO: 0 %
INTERPRETATION, 117893: NORMAL
KETONES UR QL STRIP: NEGATIVE
LEUKOCYTE ESTERASE UR QL STRIP: NEGATIVE
LYMPHOCYTES # BLD AUTO: 3.9 X10E3/UL (ref 0.7–3.1)
LYMPHOCYTES NFR BLD AUTO: 42 %
M PROTEIN SERPL ELPH-MCNC: ABNORMAL G/DL
M TB IFN-G BLD-IMP: NEGATIVE
M TB IFN-G CD4+ BCKGRND COR BLD-ACNC: 0.15 IU/ML
MCH RBC QN AUTO: 30.7 PG (ref 26.6–33)
MCHC RBC AUTO-ENTMCNC: 34.4 G/DL (ref 31.5–35.7)
MCV RBC AUTO: 89 FL (ref 79–97)
MICRO URNS: NORMAL
MICRO URNS: NORMAL
MITOGEN IGNF BLD-ACNC: >10 IU/ML
MONOCYTES # BLD AUTO: 0.6 X10E3/UL (ref 0.1–0.9)
MONOCYTES NFR BLD AUTO: 6 %
MUCOUS THREADS URNS QL MICRO: PRESENT
NEUTROPHILS # BLD AUTO: 4.6 X10E3/UL (ref 1.4–7)
NEUTROPHILS NFR BLD AUTO: 50 %
NITRITE UR QL STRIP: NEGATIVE
PH UR STRIP: 5.5 [PH] (ref 5–7.5)
PLATELET # BLD AUTO: 328 X10E3/UL (ref 150–450)
PLEASE NOTE, 011150: ABNORMAL
POTASSIUM SERPL-SCNC: 4.2 MMOL/L (ref 3.5–5.2)
PROT PATTERN SERPL ELPH-IMP: ABNORMAL
PROT SERPL-MCNC: 7.6 G/DL (ref 6–8.5)
PROT UR QL STRIP: NEGATIVE
PROT UR-MCNC: 13.4 MG/DL
PROT/CREAT UR: 79 MG/G CREAT (ref 0–200)
QUANTIFERON INCUBATION, QF1T: NORMAL
QUANTIFERON TB2 AG: 0.13 IU/ML
RBC # BLD AUTO: 4.43 X10E6/UL (ref 3.77–5.28)
RBC #/AREA URNS HPF: ABNORMAL /HPF (ref 0–2)
RHEUMATOID FACT SERPL-ACNC: 10.2 IU/ML (ref 0–13.9)
SCREEN APTT: 31.9 SEC (ref 0–51.9)
SCREEN DRVVT: 42.1 SEC (ref 0–47)
SERVICE CMNT-IMP: NORMAL
SODIUM SERPL-SCNC: 136 MMOL/L (ref 134–144)
SP GR UR: 1.02 (ref 1–1.03)
TREPONEMA PALLIDUM IGG+IGM AB [PRESENCE] IN SERUM OR PLASMA BY IMMUNOASSAY: NON REACTIVE
TSH SERPL DL<=0.005 MIU/L-ACNC: 2.46 UIU/ML (ref 0.45–4.5)
URATE SERPL-MCNC: 7.4 MG/DL (ref 2.5–7.1)
URINALYSIS REFLEX, 377202: NORMAL
UROBILINOGEN UR STRIP-MCNC: 0.2 MG/DL (ref 0.2–1)
VIT B12 SERPL-MCNC: 356 PG/ML (ref 232–1245)
VIT B6 SERPL-MCNC: 10.5 UG/L (ref 2–32.8)
WBC # BLD AUTO: 9.3 X10E3/UL (ref 3.4–10.8)
WBC #/AREA URNS HPF: ABNORMAL /HPF (ref 0–5)

## 2020-07-01 PROBLEM — E55.9 VITAMIN D DEFICIENCY: Status: ACTIVE | Noted: 2020-07-01

## 2020-07-01 RX ORDER — ERGOCALCIFEROL 1.25 MG/1
50000 CAPSULE ORAL
Qty: 12 CAP | Refills: 4 | Status: SHIPPED | OUTPATIENT
Start: 2020-07-01 | End: 2020-10-26 | Stop reason: SDUPTHER

## 2020-07-01 RX ORDER — FOLIC ACID 1 MG/1
1 TABLET ORAL DAILY
Qty: 90 TAB | Refills: 0 | Status: SHIPPED | OUTPATIENT
Start: 2020-07-01 | End: 2020-08-26 | Stop reason: SDUPTHER

## 2020-07-01 RX ORDER — METHOTREXATE 2.5 MG/1
15 TABLET ORAL
Qty: 72 TAB | Refills: 0 | Status: SHIPPED | OUTPATIENT
Start: 2020-07-03 | End: 2020-08-20

## 2020-07-01 NOTE — PROGRESS NOTES
The results were reviewed. Elevated inflammatory markers (ESR, C3, C4). SPEP/EMMA shows polyclonal gammopathy (elevated IgA, IgG). Vitamin D is low (24.9). I will prescribe weekly vitamin D called ergocalciferol 50,000. Stop daily supplement. Uric acid 7.4 mg/dL (gout level). All remaining labs are normal/negative. No evidence of lupus.

## 2020-07-01 NOTE — TELEPHONE ENCOUNTER
From: Sheltonrandy Delong  Sent: 7/1/2020 3:52 PM EDT  To: Venus Antunez MD  Subject: RE: Test Results Question    Yes it's still off and on and then I fell over the weekend too    ----- Message -----  From: Venus Antunez MD  Sent: 7/1/20 2:34 PM  To: Shelton Baljeet  Subject: RE: Test Results Question    So are you having pain in your hands still?      ----- Message -----   From:Winterian Marquita Mahmood   Sent:7/1/2020 2:32 PM EDT   To:Ad Eller MD   Subject:RE: Test Results Question      No I'm not The medicine is okay because I took it before for my hands before and for my feet it helps for a little while       ----- Message -----   Edmundo Kennedy MD   Sent:7/1/2020 1:35 PM EDT   To:Bianca Mahmood   Subject:RE: Test Results Question    What do you mean relaxing? Are you taking any NSAIDs? If you're having stomach upset, stop it.      ----- Message -----   From:Bianca Mahmood   Sent:7/1/2020 9:32 AM EDT   To:Ad Eller MD   Subject:RE: Test Results Question    It is relaxing a little bit but it just makes my stomach hurt real bad      ----- Message -----   Edmundo Kennedy MD   Sent:7/1/2020 6:12 AM EDT   To:Bianca Mahmood   Subject:RE: Test Results Question    Just released them. How are you feeling on prednisone?      ----- Message -----   From:Bianca Mahmood   Sent:6/30/2020 4:42 PM EDT   To:Ad Eller MD   Subject:Test Results Question    Just want to know are you going to let me know what my blood results mean because I do not understand some of them       - - - DISCLAIMER - - -  [email or web address][This message may contain formatting that cannot be shown on this site.]

## 2020-07-02 ENCOUNTER — HOSPITAL ENCOUNTER (OUTPATIENT)
Dept: GENERAL RADIOLOGY | Age: 40
Discharge: HOME | End: 2020-07-02
Payer: MEDICARE

## 2020-07-02 DIAGNOSIS — M06.9 RHEUMATOID ARTHRITIS WITH UNKNOWN RHEUMATOID FACTOR STATUS (HCC): ICD-10-CM

## 2020-07-02 PROCEDURE — 73130 X-RAY EXAM OF HAND: CPT

## 2020-07-02 PROCEDURE — 73630 X-RAY EXAM OF FOOT: CPT

## 2020-07-06 NOTE — PROGRESS NOTES
The results were reviewed. Erosive bilateral first metatarsal tarsal arthropathy, which may be seen in inflammatory arthritis, like Rheumatoid Arthritis.

## 2020-07-06 NOTE — PROGRESS NOTES
The results were reviewed. Erosive bilateral first metatarsal tarsal arthropathy, which may be seen in inflammatory arthritis, such as Rheumatoid Arthritis.

## 2020-07-17 ENCOUNTER — HOSPITAL ENCOUNTER (OUTPATIENT)
Dept: MAMMOGRAPHY | Age: 40
Discharge: HOME | End: 2020-07-17
Attending: FAMILY MEDICINE
Payer: MEDICARE

## 2020-07-17 DIAGNOSIS — Z12.31 VISIT FOR SCREENING MAMMOGRAM: ICD-10-CM

## 2020-07-17 PROCEDURE — 77067 SCR MAMMO BI INCL CAD: CPT

## 2020-07-21 ENCOUNTER — TELEPHONE (OUTPATIENT)
Dept: SLEEP MEDICINE | Age: 40
End: 2020-07-21

## 2020-08-26 RX ORDER — SITAGLIPTIN AND METFORMIN HYDROCHLORIDE 50; 1000 MG/1; MG/1
1 TABLET, FILM COATED ORAL DAILY
COMMUNITY

## 2020-08-27 ENCOUNTER — VIRTUAL VISIT (OUTPATIENT)
Dept: RHEUMATOLOGY | Age: 40
End: 2020-08-27
Payer: MEDICARE

## 2020-08-27 DIAGNOSIS — M06.09 SERONEGATIVE RHEUMATOID ARTHRITIS OF MULTIPLE SITES (HCC): Primary | ICD-10-CM

## 2020-08-27 DIAGNOSIS — M79.602 PARESTHESIA AND PAIN OF BOTH UPPER EXTREMITIES: ICD-10-CM

## 2020-08-27 DIAGNOSIS — R20.2 PARESTHESIA AND PAIN OF BOTH UPPER EXTREMITIES: ICD-10-CM

## 2020-08-27 DIAGNOSIS — Z79.60 LONG-TERM USE OF IMMUNOSUPPRESSANT MEDICATION: ICD-10-CM

## 2020-08-27 DIAGNOSIS — M79.601 PARESTHESIA AND PAIN OF BOTH UPPER EXTREMITIES: ICD-10-CM

## 2020-08-27 DIAGNOSIS — E55.9 VITAMIN D DEFICIENCY: ICD-10-CM

## 2020-08-27 PROCEDURE — G8432 DEP SCR NOT DOC, RNG: HCPCS | Performed by: INTERNAL MEDICINE

## 2020-08-27 PROCEDURE — G8427 DOCREV CUR MEDS BY ELIG CLIN: HCPCS | Performed by: INTERNAL MEDICINE

## 2020-08-27 PROCEDURE — 99215 OFFICE O/P EST HI 40 MIN: CPT | Performed by: INTERNAL MEDICINE

## 2020-08-27 PROCEDURE — G8756 NO BP MEASURE DOC: HCPCS | Performed by: INTERNAL MEDICINE

## 2020-08-27 RX ORDER — METHOTREXATE 2.5 MG/1
TABLET ORAL
Qty: 72 TAB | Refills: 0 | Status: SHIPPED | OUTPATIENT
Start: 2020-08-27 | End: 2020-08-27

## 2020-08-27 RX ORDER — METHOTREXATE 2.5 MG/1
20 TABLET ORAL
Qty: 96 TAB | Refills: 0 | Status: SHIPPED | OUTPATIENT
Start: 2020-08-28 | End: 2020-09-14 | Stop reason: SDUPTHER

## 2020-08-27 RX ORDER — FOLIC ACID 1 MG/1
1 TABLET ORAL DAILY
Qty: 90 TAB | Refills: 0 | Status: SHIPPED | OUTPATIENT
Start: 2020-08-27 | End: 2020-10-26 | Stop reason: SDUPTHER

## 2020-08-27 NOTE — PROGRESS NOTES
REASON FOR VISIT    This is a follow-up visit for Ms. Rosa Isela Naranjo for     ICD-10-CM   1. Seronegative rheumatoid arthritis of multiple sites [facility])  M06.09     The patient has consented for synchronous (real-time) Telemedicine (audio-video technology) on 8/27/2020 for their care to be delivered over telemedicine in place of their regularly scheduled office visit pursuant to the emergency declaration under the [address] and the [address] authority and the [employer] Act, this Virtual  Visit was conducted, with patient's consent, to reduce the patient's risk of exposure to COVID-19 and provide continuity of care for an established patient. Services were provided through a video synchronous discussion virtually to substitute for in-person clinic visit. Inflammatory arthritis phenotype includes:  Anti-CCP positive: no  Rheumatoid factor positive: no  Erosive disease: yes  Extra-articular manifestations include: polyclonal gammopathy (elevated IgA, IgG)    Immunosuppression Screening (6/26/2020):  Quantiferon TB: negative  PPD:  Not performed  Hepatitis B: negative  Hepatitis C: negative    Therapy History includes:  Current DMARD therapy include: methotrexate 15 mg every Friday (7/03/2020 to present)  Prior DMARD therapy include: none  Discontinued DMARDs because of inefficacy: None  Discontinued DMARDs because of side effects: None    There is no immunization history on file for this patient.     Patient Active Problem List   Diagnosis Code    Morbid obesity ([facility] E66.01    S/P endometrial ablation Z98.890    History of hysterectomy including cervix Z90.710    Heel spur M77.30    Diabetes mellitus type 2, controlled ([address] E11.9    Hypertension I10    RUQ abdominal pain R10.11    Vitamin D deficiency E55.9    Seronegative rheumatoid arthritis of multiple sites ([facility] M06.09    Long-term use of immunosuppressant medication W27.759       Kiki Revering    Ms. Janet Keys returns for a follow-up. On her last visit, I ordered labs and radiographs and started a prednisone taper, which helped her, so I started methotrexate 15 mg every Friday, which she has taken with good tolerance. I reviewed her foot radiograph which showed erosive disease in her 1st MTP base. Today, she feels much better. She has less burning and aching pain compared to before. She has burning aching pain in her hands with some swelling associated with pruritis. She has swelling in her wrists. Her foot pain is much better as well and is no longer is aching burning like before. She denies fever, weight loss, blurred vision, vision loss, oral ulcers, ankle swelling, dry cough, dyspnea, nausea, vomiting, dysphagia, abdominal pain, black or bloody stool, fall since last visit, rash, easy bruising and increased thirst.    Last toxicity monitoring by blood work was done on 6/26/2020 and did not reveal any significant adverse effects. Most recent inflammatory markers from 6/26/2020 revealed a ESR 43 mm/hr and CRP 8 mg/L. The patient has not had any interval hospital admissions, infections, or surgeries. REVIEW OF SYSTEMS    A comprehensive review of systems was performed and pertinent results are documented in the HPI, review of systems is otherwise non-contributory. PAST MEDICAL HISTORY    She has a past medical history of Arthritis, Back pain, Diabetes ([address] Gout, Hypertension, Morbid obesity ([address] Neuropathy, Psychiatric disorder, and Unspecified sleep apnea. FAMILY HISTORY    Her family history includes Breast Cancer in her paternal grandmother; Breast Cancer (age of onset: 61) in her maternal grandmother; Cancer in her maternal grandmother and paternal grandmother; Crohn's Disease in her sister; Heart Disease in her father; Hypertension in her paternal grandmother; Lupus in her sister and sister;  No Known Problems in her mother. SOCIAL HISTORY    She reports that she quit smoking about 4 years ago. She has never used smokeless tobacco. She reports current alcohol use. She reports that she does not use drugs. MEDICATIONS    Current Outpatient Medications   Medication Sig Dispense Refill    folic acid (FOLVITE) 1 mg tablet Take 1 Tab by mouth daily. 90 Tab 0    [START ON 8/28/2020] methotrexate (RHEUMATREX) 2.5 mg tablet Take 8 Tabs by mouth Every Friday. 96 Tab 0    SITagliptin-metFORMIN (Janumet) 50-1,000 mg per tablet Take 1 Tab by mouth daily.  ergocalciferol (ERGOCALCIFEROL) 1,250 mcg (50,000 unit) capsule Take 1 Cap by mouth every seven (7) days. Indications: vitamin D deficiency (high dose therapy) 12 Cap 4    mometasone (NASONEX) 50 mcg/actuation nasal spray Nasonex 50 mcg/actuation Spray      LYRICA 75 mg capsule 150 mg daily.  COLCRYS 0.6 mg tablet as needed.  hydrochlorothiazide (HYDRODIURIL) 25 mg tablet Take 25 mg by mouth daily.  traZODone (DESYREL) 50 mg tablet Take 50 mg by mouth nightly.  esomeprazole (NEXIUM) 40 mg capsule Take 40 mg by mouth daily.  buPROPion XL (WELLBUTRIN XL) 150 mg tablet Take 150 mg by mouth every morning. Indications: DEPRESSION ASSOCIATED WITH MANIC DEPRESSIVE DISORDER      metoprolol (LOPRESSOR) 50 mg tablet Take  by mouth two (2) times a day. ALLERGIES    Allergies   Allergen Reactions    Latex Rash       PHYSICAL EXAMINATION    There were no vitals taken for this visit. There is no height or weight on file to calculate BMI. General: Patient is alert, oriented x 3, not in acute distress    HEENT:   Sclerae are not injected and appear moist.  There is no alopecia. Neck is supple     Chest:  Breathing comfortably at room air. Musculoskeletal exam:  A comprehensive musculoskeletal exam was NOT performed for all joints of each upper and lower extremity and assessed for swelling, tenderness and range of motion.  Positive results are documented as below:    Previous Exam    Decreased passive extension of right wrist  Tenderness described as burning in interphalageal areas, PIPs and DIP  Bilateral MTP tenderness     Z-Deformities:   no  [place] Neck Deformities:  no  Boutonierre's Deformities:  no  Ulnar Deviation:   no  MCP Subluxation:  no     Joint Count 6/26/2020   MHAQ 0.5   Left wrist- Tender 1   Left wrist- Swollen 1   Left 1st MCP - Tender 1   Left 1st MCP - Swollen 0   Left 2nd MCP - Tender 1   Left 2nd MCP - Swollen 0   Left 3rd MCP - Tender 1   Left 3rd MCP - Swollen 0   Left 4th MCP - Tender 1   Left 4th MCP - Swollen 0   Left 5th MCP - Tender 1   Left 5th MCP - Swollen 0   Left thumb IP - Tender 0   Left thumb IP - Swollen 1   Left 2nd PIP - Tender 1   Left 2nd PIP - Swollen 0   Left 3rd PIP - Tender 1   Left 3rd PIP - Swollen 0   Left 4th PIP - Tender 1   Left 4th PIP - Swollen 0   Left 5th PIP - Tender 1   Left 5th PIP - Swollen 0   Right wrist- Tender 1   Right wrist- Swollen 1   Right 1st MCP - Tender 1   Right 1st MCP - Swollen 0   Right 2nd MCP - Tender 1   Right 2nd MCP - Swollen 0   Right 3rd MCP - Tender 1   Right 3rd MCP - Swollen 0   Right 4th MCP - Tender 1   Right 4th MCP - Swollen 0   Right 5th MCP - Tender 1   Right 5th MCP - Swollen 0   Right thumb IP - Tender 1   Right thumb IP - Swollen 0   Right 2nd PIP - Tender 1   Right 2nd PIP - Swollen 0   Right 3rd PIP - Tender 1   Right 3rd PIP - Swollen 0   Right 4th PIP - Tender 1   Right 4th PIP - Swollen 0   Right 5th PIP - Tender 1   Right 5th PIP - Swollen 0   Tender Joint Count (Total) 21   Swollen Joint Count (Total) 3       DATA REVIEW    Laboratory     Recent laboratory results were reviewed, summarized, and discussed with the patient. Imaging    Musculoskeletal Ultrasound    None    Radiographs    Bilateral Hand 7/02/2020: RIGHT: no fracture or other acute osseous or articular abnormality. The soft tissues are within normal limits.  Mineralization is normal. There are no erosions. There is no evidence for CPPD. LEFT: no fracture or other acute osseous or articular abnormality. The soft tissues are within normal limits. Mineralization is normal. There are no erosions. There is no evidence for CPPD    Bilateral Foot 7/02/2020: RIGHT: no fracture. Mineralization is normal. There is an erosive change at the first metatarsal tarsal junction. A large plantar spur is seen. RIGHT: no fracture. Mineralization is normal. There is a severe erosive change at the first metatarsal tarsal junction. A large plantar spur is seen. Bilateral Hand 12/31/2019: LEFT: These demonstrate mild arthritic change throughout the hand.  No significant acute findings. RIGHT: These demonstrate mild arthritic change throughout the hand.  No significant acute findings. She was prescribed a Medrol dose pack which did not help. CT Imaging    None    MR Imaging    None    DXA     None    ASSESSMENT AND PLAN    This is a follow-up visit for Ms. Julius Ervin. 1) Suspected Seronegative Erosive Rheumatoid Arthritis. She has bilateral wrist swelling and decreased ROM of right wrist, suspicious of radiocarpal arthritis, which would be a secondary pathology to injury or inflammatory arthritis. She denies injury. She has neuropathy which improves with Lyrica and was given a Medrol dose pack without benefit. She may have small fiber neuropathy as well. Her sisters have lupus. She has been on methotrexate 15 mg every Friday with good tolerance since 7/03/2020. She feels much better. She reports burning pain and feet that has also improved, although that is unclear to me, since that is neuropathic. Since she feels better, I will continue methotrexate but will increase her dose to 20 mg weekly. I ordered labs which will be mailed. 2) Bilateral Hand and Foot Neuropathy. She reports burning pruritic pain in her hands and feet that is chronic. Lyrica helps.  She has a history of carpal tunnel syndrome s/p release without relief but carpal tunnel syndrome would not involve the 5th digits. Vitamin B12 and folate were normal. I had referred her to neurology for further evaluation and testing. She may have small fiber neuropathy, so if her EMG/NCS is normal, I recommend a skin biopsy. 3) Long Term Use of Immunosuppressants. The patient remains on immunomodulatory medications (methotrexate) and requires frequent toxicity monitoring by blood work. Respective labs were ordered (CBC and CMP) will be mailed. 4) Vitamin D Deficiency. The patient's vitamin D level was 24.9. I had prescribed weekly ergocalciferol 50,000. I will check her level on follow up.    5) History of Gout. This was diagnosed by her podiatrist based on burning and throbbing pain in her feet. She feels at times, pain in her big toe and when she takes colchicine, it helps. Her history is not consistent with gout. Her uric acid 7.4 mg/dL    The patient has consented for synchronous (real-time) Telemedicine (audio-video technology) on 8/27/2020 for their care to be delivered over telemedicine in place of their regularly scheduled office visit pursuant to the emergency declaration under the [address] and the [facility] waiver authority and the [employer] Act, this Virtual  Visit was conducted, with patient's consent, to reduce the patient's risk of exposure to COVID-19 and provide continuity of care for an established patient. Services were provided through a video synchronous discussion virtually to substitute for in-person clinic visit.     TODAY'S ORDERS    Orders Placed This Encounter    CBC WITH AUTOMATED DIFF    METABOLIC PANEL, COMPREHENSIVE    SED RATE (ESR)    C REACTIVE PROTEIN, QT    METHOTREXATE POLYGLUTAMATES    folic acid (FOLVITE) 1 mg tablet    methotrexate (RHEUMATREX) 2.5 mg tablet     Future Appointments   Date Time Provider Florencio [place]   10/26/2020 10:00 AM Kristan Tapia MD [facility] BS AMB   3/5/2021 11:40 AM Kate Nunez MD [facility] BS AMB     Castillo Zee MD, [address] [place] [address]    Adult Rheumatology   Rheumatology Ultrasound Certified  [facility]  A Part of [facility], [address] [place] [address] Road   Phone [phone]  Fax [phone]

## 2020-09-14 RX ORDER — METHOTREXATE 2.5 MG/1
20 TABLET ORAL
Qty: 96 TAB | Refills: 0 | Status: SHIPPED | OUTPATIENT
Start: 2020-09-18 | End: 2020-10-26 | Stop reason: SDUPTHER

## 2020-09-14 RX ORDER — METHOTREXATE 2.5 MG/1
20 TABLET ORAL
Qty: 96 TAB | Refills: 0 | Status: SHIPPED | OUTPATIENT
Start: 2020-09-18 | End: 2020-09-14 | Stop reason: SDUPTHER

## 2020-09-22 ENCOUNTER — TELEPHONE (OUTPATIENT)
Dept: SLEEP MEDICINE | Age: 40
End: 2020-09-22

## 2020-09-22 LAB
25(OH)D3+25(OH)D2 SERPL-MCNC: 32.7 NG/ML (ref 30–100)
ALBUMIN SERPL ELPH-MCNC: 3.5 G/DL (ref 2.9–4.4)
ALBUMIN SERPL-MCNC: 4 G/DL (ref 3.8–4.8)
ALBUMIN/GLOB SERPL: 0.9 {RATIO} (ref 0.7–1.7)
ALBUMIN/GLOB SERPL: 1.3 {RATIO} (ref 1.2–2.2)
ALP SERPL-CCNC: 73 IU/L (ref 39–117)
ALPHA1 GLOB SERPL ELPH-MCNC: 0.1 G/DL (ref 0–0.4)
ALPHA2 GLOB SERPL ELPH-MCNC: 0.8 G/DL (ref 0.4–1)
ALT SERPL-CCNC: 10 IU/L (ref 0–32)
AST SERPL-CCNC: 14 IU/L (ref 0–40)
B-GLOBULIN SERPL ELPH-MCNC: 1.2 G/DL (ref 0.7–1.3)
BASOPHILS # BLD AUTO: 0.1 X10E3/UL (ref 0–0.2)
BASOPHILS NFR BLD AUTO: 1 %
BILIRUB SERPL-MCNC: 0.3 MG/DL (ref 0–1.2)
BUN SERPL-MCNC: 6 MG/DL (ref 6–24)
BUN/CREAT SERPL: 8 (ref 9–23)
CALCIUM SERPL-MCNC: 9.7 MG/DL (ref 8.7–10.2)
CCP IGA+IGG SERPL IA-ACNC: 6 UNITS (ref 0–19)
CHLORIDE SERPL-SCNC: 100 MMOL/L (ref 96–106)
CO2 SERPL-SCNC: 22 MMOL/L (ref 20–29)
COMMENT, 144067: NORMAL
CREAT SERPL-MCNC: 0.72 MG/DL (ref 0.57–1)
CRP SERPL-MCNC: 11 MG/L (ref 0–10)
EOSINOPHIL # BLD AUTO: 0.2 X10E3/UL (ref 0–0.4)
EOSINOPHIL NFR BLD AUTO: 3 %
ERYTHROCYTE [DISTWIDTH] IN BLOOD BY AUTOMATED COUNT: 13.1 % (ref 11.7–15.4)
ERYTHROCYTE [SEDIMENTATION RATE] IN BLOOD BY WESTERGREN METHOD: 60 MM/HR (ref 0–32)
FOLATE SERPL-MCNC: 10.7 NG/ML
GAMMA GLOB SERPL ELPH-MCNC: 1.5 G/DL (ref 0.4–1.8)
GAMMA INTERFERON BACKGROUND BLD IA-ACNC: 0.02 IU/ML
GLOBULIN SER CALC-MCNC: 3.2 G/DL (ref 1.5–4.5)
GLOBULIN SER CALC-MCNC: 3.7 G/DL (ref 2.2–3.9)
GLUCOSE SERPL-MCNC: 121 MG/DL (ref 65–99)
HBV CORE AB SERPL QL IA: NEGATIVE
HBV CORE IGM SERPL QL IA: NEGATIVE
HBV E AB SERPL QL IA: NEGATIVE
HBV E AG SERPL QL IA: NEGATIVE
HBV SURFACE AB SER QL: NON REACTIVE
HBV SURFACE AG SERPL QL IA: NEGATIVE
HCT VFR BLD AUTO: 39.5 % (ref 34–46.6)
HCV AB S/CO SERPL IA: <0.1 S/CO RATIO (ref 0–0.9)
HGB BLD-MCNC: 13.2 G/DL (ref 11.1–15.9)
IMM GRANULOCYTES # BLD AUTO: 0 X10E3/UL (ref 0–0.1)
IMM GRANULOCYTES NFR BLD AUTO: 0 %
LYMPHOCYTES # BLD AUTO: 4 X10E3/UL (ref 0.7–3.1)
LYMPHOCYTES NFR BLD AUTO: 44 %
M PROTEIN SERPL ELPH-MCNC: NORMAL G/DL
M TB IFN-G BLD-IMP: NEGATIVE
M TB IFN-G CD4+ BCKGRND COR BLD-ACNC: 0.02 IU/ML
MCH RBC QN AUTO: 30.3 PG (ref 26.6–33)
MCHC RBC AUTO-ENTMCNC: 33.4 G/DL (ref 31.5–35.7)
MCV RBC AUTO: 91 FL (ref 79–97)
MITOGEN IGNF BLD-ACNC: >10 IU/ML
MONOCYTES # BLD AUTO: 0.6 X10E3/UL (ref 0.1–0.9)
MONOCYTES NFR BLD AUTO: 7 %
NEUTROPHILS # BLD AUTO: 4.2 X10E3/UL (ref 1.4–7)
NEUTROPHILS NFR BLD AUTO: 45 %
PLATELET # BLD AUTO: 326 X10E3/UL (ref 150–450)
PLEASE NOTE, 011150: NORMAL
POTASSIUM SERPL-SCNC: 4.4 MMOL/L (ref 3.5–5.2)
PROT PATTERN SERPL ELPH-IMP: NORMAL
PROT SERPL-MCNC: 7.2 G/DL (ref 6–8.5)
QUANTIFERON INCUBATION, QF1T: NORMAL
QUANTIFERON TB2 AG: 0.02 IU/ML
RBC # BLD AUTO: 4.35 X10E6/UL (ref 3.77–5.28)
RHEUMATOID FACT SERPL-ACNC: <10 IU/ML (ref 0–13.9)
SERVICE CMNT-IMP: NORMAL
SODIUM SERPL-SCNC: 138 MMOL/L (ref 134–144)
TSH SERPL DL<=0.005 MIU/L-ACNC: 2.76 UIU/ML (ref 0.45–4.5)
URATE SERPL-MCNC: 6.9 MG/DL (ref 2.5–7.1)
VIT B12 SERPL-MCNC: 392 PG/ML (ref 232–1245)
VIT B6 SERPL-MCNC: 8.8 UG/L (ref 2–32.8)
WBC # BLD AUTO: 9 X10E3/UL (ref 3.4–10.8)

## 2020-09-22 NOTE — TELEPHONE ENCOUNTER
The patient phoned the office stating that [employer] sent her small nasal pillows for her PAP device, and she needs medium. I spoke with Akbar Vital at [facility] and she said that she will send the request to that department in order for the patient to get the correct supplies.

## 2020-09-27 NOTE — PROGRESS NOTES
The results were reviewed. Elevated inflammatory marker (ESR 60 mm/hr, CRP 11 mg/L). All remaining labs are normal/negative.

## 2020-10-02 ENCOUNTER — DOCUMENTATION ONLY (OUTPATIENT)
Dept: SLEEP MEDICINE | Age: 40
End: 2020-10-02

## 2020-10-26 ENCOUNTER — TELEPHONE (OUTPATIENT)
Dept: RHEUMATOLOGY | Age: 40
End: 2020-10-26

## 2020-10-26 ENCOUNTER — VIRTUAL VISIT (OUTPATIENT)
Dept: RHEUMATOLOGY | Age: 40
End: 2020-10-26
Payer: MEDICARE

## 2020-10-26 DIAGNOSIS — E55.9 VITAMIN D DEFICIENCY: ICD-10-CM

## 2020-10-26 DIAGNOSIS — M79.602 PARESTHESIA AND PAIN OF BOTH UPPER EXTREMITIES: ICD-10-CM

## 2020-10-26 DIAGNOSIS — Z79.60 LONG-TERM USE OF IMMUNOSUPPRESSANT MEDICATION: ICD-10-CM

## 2020-10-26 DIAGNOSIS — R20.2 PARESTHESIA AND PAIN OF BOTH UPPER EXTREMITIES: ICD-10-CM

## 2020-10-26 DIAGNOSIS — M79.601 PARESTHESIA AND PAIN OF BOTH UPPER EXTREMITIES: ICD-10-CM

## 2020-10-26 DIAGNOSIS — M06.09 SERONEGATIVE RHEUMATOID ARTHRITIS OF MULTIPLE SITES (HCC): Primary | ICD-10-CM

## 2020-10-26 PROCEDURE — G8428 CUR MEDS NOT DOCUMENT: HCPCS | Performed by: INTERNAL MEDICINE

## 2020-10-26 PROCEDURE — 99215 OFFICE O/P EST HI 40 MIN: CPT | Performed by: INTERNAL MEDICINE

## 2020-10-26 PROCEDURE — G8756 NO BP MEASURE DOC: HCPCS | Performed by: INTERNAL MEDICINE

## 2020-10-26 PROCEDURE — G8432 DEP SCR NOT DOC, RNG: HCPCS | Performed by: INTERNAL MEDICINE

## 2020-10-26 RX ORDER — METHOTREXATE 2.5 MG/1
20 TABLET ORAL
Qty: 96 TAB | Refills: 0 | Status: SHIPPED | OUTPATIENT
Start: 2020-10-30 | End: 2020-12-17

## 2020-10-26 RX ORDER — FOLIC ACID 1 MG/1
1 TABLET ORAL DAILY
Qty: 90 TAB | Refills: 5 | Status: SHIPPED | OUTPATIENT
Start: 2020-10-26 | End: 2021-04-23 | Stop reason: SDUPTHER

## 2020-10-26 RX ORDER — ERGOCALCIFEROL 1.25 MG/1
50000 CAPSULE ORAL
Qty: 12 CAP | Refills: 4 | Status: SHIPPED | OUTPATIENT
Start: 2020-10-26 | End: 2021-04-23 | Stop reason: SDUPTHER

## 2020-10-26 NOTE — PROGRESS NOTES
REASON FOR VISIT    This is a follow-up visit for Ms. Alejandro Hussein for     ICD-10-CM   1. Seronegative rheumatoid arthritis of multiple sites [facility])  M06.09     The patient has consented for synchronous (real-time) Telemedicine (audio-video technology) on 10/26/2020 for their care to be delivered over telemedicine in place of their regularly scheduled office visit pursuant to the emergency declaration under the [address] [place] [address] and the [employer] Act, this Virtual  Visit was conducted, with patient's consent, to reduce the patient's risk of exposure to COVID-19 and provide continuity of care for an established patient. Services were provided through a video synchronous discussion virtually to substitute for in-person clinic visit. Inflammatory arthritis phenotype includes:  Anti-CCP positive: no  Rheumatoid factor positive: no  Erosive disease: yes  Extra-articular manifestations include: polyclonal gammopathy (elevated IgA, IgG)    Immunosuppression Screening (6/26/2020):  Quantiferon TB: negative  PPD:  Not performed  Hepatitis B: negative  Hepatitis C: negative    Therapy History includes:  Current DMARD therapy include: methotrexate 20 mg every Friday (7/03/2020 to present)  Prior DMARD therapy include: none  Discontinued DMARDs because of inefficacy: None  Discontinued DMARDs because of side effects: None    HISTORY OF PRESENT ILLNESS    Ms. Alejandro Hussein returns for a follow-up. On her last visit, I increased her methotrexate from 15 mg to 20 mg every Friday, which she has taken with good tolerance. I ordered lab which I reviewed with her which showed elevated ESR and CRP. Today, she feels a little better. She burning and aching pain in her hands in the morning lasting 15 minutes. She feels there is welling in her left hand in the morning. She has morning stiffness lasting 15 minutes in the morning. She feels better with methotrexate. Her wrists no longer bother. She has aching and burning pain in her feet in the morning lasting around 20 minutes and then gets better. She has not seen neurology yet. She also has cramping in her knees and calves that is worse in the morning. She denies fever, weight loss, blurred vision, vision loss, oral ulcers, ankle swelling, dry cough, dyspnea, nausea, vomiting, dysphagia, abdominal pain, black or bloody stool, fall since last visit, rash, easy bruising and increased thirst.    Last toxicity monitoring by blood work was done on 9/16/2020 and did not reveal any significant adverse effects. Most recent inflammatory markers from 9/16/2020 revealed a ESR 60 mm/hr and CRP 11 mg/L. The patient has not had any interval hospital admissions, infections, or surgeries. REVIEW OF SYSTEMS    A comprehensive review of systems was performed and pertinent results are documented in the HPI, review of systems is otherwise non-contributory. PAST MEDICAL HISTORY    She has a past medical history of Arthritis, Back pain, Diabetes ([address] Gout, Hypertension, Morbid obesity ([address] Neuropathy, Psychiatric disorder, and Unspecified sleep apnea. FAMILY HISTORY    Her family history includes Breast Cancer in her paternal grandmother; Breast Cancer (age of onset: 61) in her maternal grandmother; Cancer in her maternal grandmother and paternal grandmother; Crohn's Disease in her sister; Heart Disease in her father; Hypertension in her paternal grandmother; Lupus in her sister and sister; No Known Problems in her mother. SOCIAL HISTORY    She reports that she quit smoking about 4 years ago. She has never used smokeless tobacco. She reports current alcohol use. She reports that she does not use drugs.     MEDICATIONS    Current Outpatient Medications   Medication Sig Dispense Refill    ergocalciferol (ERGOCALCIFEROL) 1,250 mcg (50,000 unit) capsule Take 1 Cap by mouth every seven (7) days. Indications: vitamin D deficiency (high dose therapy) 12 Cap 4    folic acid (FOLVITE) 1 mg tablet Take 1 Tab by mouth daily. 90 Tab 5    [START ON 10/30/2020] methotrexate (RHEUMATREX) 2.5 mg tablet Take 8 Tabs by mouth Every Friday. 96 Tab 0    SITagliptin-metFORMIN (Janumet) 50-1,000 mg per tablet Take 1 Tab by mouth daily.  mometasone (NASONEX) 50 mcg/actuation nasal spray Nasonex 50 mcg/actuation Spray      LYRICA 75 mg capsule 150 mg daily.  COLCRYS 0.6 mg tablet as needed.  hydrochlorothiazide (HYDRODIURIL) 25 mg tablet Take 25 mg by mouth daily.  traZODone (DESYREL) 50 mg tablet Take 50 mg by mouth nightly.  esomeprazole (NEXIUM) 40 mg capsule Take 40 mg by mouth daily.  buPROPion XL (WELLBUTRIN XL) 150 mg tablet Take 150 mg by mouth every morning. Indications: DEPRESSION ASSOCIATED WITH MANIC DEPRESSIVE DISORDER      metoprolol (LOPRESSOR) 50 mg tablet Take  by mouth two (2) times a day. ALLERGIES    Allergies   Allergen Reactions    Latex Rash       PHYSICAL EXAMINATION    There were no vitals taken for this visit. There is no height or weight on file to calculate BMI. General: Patient is alert, oriented x 3, not in acute distress    HEENT:   Sclerae are not injected and appear moist.  There is no alopecia. Neck is supple     Chest:  Breathing comfortably at room air. Musculoskeletal exam:  A comprehensive musculoskeletal exam was NOT performed for all joints of each upper and lower extremity and assessed for swelling, tenderness and range of motion.  Positive results are documented as below:    Previous Exam    Decreased passive extension of right wrist  Tenderness described as burning in interphalageal areas, PIPs and DIP  Bilateral MTP tenderness     Z-Deformities:   no  Kerrick Neck Deformities:  no  Boutonierre's Deformities:  no  Ulnar Deviation:   no  MCP Subluxation:  no     Joint Count 6/26/2020   MHAQ 0.5   Left wrist- Tender 1   Left wrist- Swollen 1   Left 1st MCP - Tender 1   Left 1st MCP - Swollen 0   Left 2nd MCP - Tender 1   Left 2nd MCP - Swollen 0   Left 3rd MCP - Tender 1   Left 3rd MCP - Swollen 0   Left 4th MCP - Tender 1   Left 4th MCP - Swollen 0   Left 5th MCP - Tender 1   Left 5th MCP - Swollen 0   Left thumb IP - Tender 0   Left thumb IP - Swollen 1   Left 2nd PIP - Tender 1   Left 2nd PIP - Swollen 0   Left 3rd PIP - Tender 1   Left 3rd PIP - Swollen 0   Left 4th PIP - Tender 1   Left 4th PIP - Swollen 0   Left 5th PIP - Tender 1   Left 5th PIP - Swollen 0   Right wrist- Tender 1   Right wrist- Swollen 1   Right 1st MCP - Tender 1   Right 1st MCP - Swollen 0   Right 2nd MCP - Tender 1   Right 2nd MCP - Swollen 0   Right 3rd MCP - Tender 1   Right 3rd MCP - Swollen 0   Right 4th MCP - Tender 1   Right 4th MCP - Swollen 0   Right 5th MCP - Tender 1   Right 5th MCP - Swollen 0   Right thumb IP - Tender 1   Right thumb IP - Swollen 0   Right 2nd PIP - Tender 1   Right 2nd PIP - Swollen 0   Right 3rd PIP - Tender 1   Right 3rd PIP - Swollen 0   Right 4th PIP - Tender 1   Right 4th PIP - Swollen 0   Right 5th PIP - Tender 1   Right 5th PIP - Swollen 0   Tender Joint Count (Total) 21   Swollen Joint Count (Total) 3     DATA REVIEW    Laboratory     Recent laboratory results were reviewed, summarized, and discussed with the patient. Imaging    Musculoskeletal Ultrasound    None    Radiographs    Bilateral Hand 7/02/2020: RIGHT: no fracture or other acute osseous or articular abnormality. The soft tissues are within normal limits. Mineralization is normal. There are no erosions. There is no evidence for CPPD. LEFT: no fracture or other acute osseous or articular abnormality. The soft tissues are within normal limits. Mineralization is normal. There are no erosions. There is no evidence for CPPD    Bilateral Foot 7/02/2020: RIGHT: no fracture. Mineralization is normal. There is an erosive change at the first metatarsal tarsal junction. A large plantar spur is seen. RIGHT: no fracture. Mineralization is normal. There is a severe erosive change at the first metatarsal tarsal junction. A large plantar spur is seen. Bilateral Hand 12/31/2019: LEFT: These demonstrate mild arthritic change throughout the hand.  No significant acute findings. RIGHT: These demonstrate mild arthritic change throughout the hand.  No significant acute findings. She was prescribed a Medrol dose pack which did not help. CT Imaging    None    MR Imaging    None    DXA     None    ASSESSMENT AND PLAN    This is a follow-up visit for Ms. Anum Mir. 1) Suspected Seronegative Erosive Rheumatoid Arthritis. She has bilateral wrist swelling and decreased ROM of right wrist, suspicious of radiocarpal arthritis, which would be a secondary pathology to injury or inflammatory arthritis. She denies injury. She has neuropathy which improves with Lyrica and was given a Medrol dose pack without benefit. She may have small fiber neuropathy as well. Her sisters have lupus. She is maintained on methotrexate 20 mg every Friday with good tolerance since 7/03/2020. She feels much better. She reports burning pain and feet that has also improved, although that is unclear to me, since that is neuropathic. Since she feels better, I will continue methotrexate 20 mg every Friday. Refills sent. I ordered labs which will be mailed. 2) Bilateral Hand and Foot Neuropathy. She reports burning pruritic pain in her hands and feet that is chronic. Lyrica helps. She has a history of carpal tunnel syndrome s/p release without relief but carpal tunnel syndrome would not involve the 5th digits. Vitamin B12 and folate were normal. I had referred her to neurology for further evaluation and testing. She may have small fiber neuropathy, so if her EMG/NCS is normal, I recommend a skin biopsy. I referred her to Dr. Cortney Mendiola since she requested to be seen at [facility].     3) Long Term Use of Immunosuppressants. The patient remains on immunomodulatory medications (methotrexate) and requires frequent toxicity monitoring by blood work. Respective labs were ordered (CBC and CMP) will be mailed. 4) Vitamin D Deficiency. The patient's vitamin D level was 32.7 (previously 24.9). She is weekly ergocalciferol 50,000. I will check her level on follow up.    5) History of Gout. This was diagnosed by her podiatrist based on burning and throbbing pain in her feet. She feels at times, pain in her big toe and when she takes colchicine, it helps. Her history is not consistent with gout. Her uric acid  Was 6.9 mg/dL (previously 7.4 mg/dL). The patient has consented for synchronous (real-time) Telemedicine (audio-video technology) on 10/26/2020 for their care to be delivered over telemedicine in place of their regularly scheduled office visit pursuant to the emergency declaration under the [address] [place] [address], [facility] waiver authority and the [employer] Act, this Virtual  Visit was conducted, with patient's consent, to reduce the patient's risk of exposure to COVID-19 and provide continuity of care for an established patient. Services were provided through a video synchronous discussion virtually to substitute for in-person clinic visit.     TODAY'S ORDERS    Orders Placed This Encounter    C REACTIVE PROTEIN, QT    CBC WITH AUTOMATED DIFF    METABOLIC PANEL, COMPREHENSIVE    VITAMIN D, 25 HYDROXY    SED RATE (ESR)   Alexia Guaman Neurology ref [facility]    ergocalciferol (ERGOCALCIFEROL) 1,250 mcg (50,000 unit) capsule    folic acid (FOLVITE) 1 mg tablet    methotrexate (RHEUMATREX) 2.5 mg tablet     Future Appointments   Date Time Provider Florencio Madrid   11/11/2020  2:20 PM Arnaldo Sanon MD [facility] BS AMB   3/5/2021 11:40 AM Arnaldo Sanon MD [facility] BS Jesus Jorgensen MD, [address]    Adult Rheumatology Rheumatology Ultrasound Certified  [facility]  A Part of [facility], [address] [place] [address] Road   Phone [phone]  Fax [phone]

## 2020-10-26 NOTE — TELEPHONE ENCOUNTER
Called patient on 10/27/2020 left voice message for patient to call back into the office to schedule 3 month f2f office visit. sdh

## 2020-11-09 ENCOUNTER — OFFICE VISIT (OUTPATIENT)
Dept: NEUROLOGY | Age: 40
End: 2020-11-09
Payer: MEDICARE

## 2020-11-09 ENCOUNTER — TELEPHONE (OUTPATIENT)
Dept: NEUROLOGY | Age: 40
End: 2020-11-09

## 2020-11-09 VITALS
OXYGEN SATURATION: 98 % | BODY MASS INDEX: 49.43 KG/M2 | RESPIRATION RATE: 18 BRPM | DIASTOLIC BLOOD PRESSURE: 88 MMHG | HEART RATE: 89 BPM | SYSTOLIC BLOOD PRESSURE: 140 MMHG | WEIGHT: 279 LBS | HEIGHT: 63 IN

## 2020-11-09 DIAGNOSIS — M79.601 PARESTHESIA AND PAIN OF BOTH UPPER EXTREMITIES: Primary | ICD-10-CM

## 2020-11-09 DIAGNOSIS — R20.2 PARESTHESIA AND PAIN OF BOTH UPPER EXTREMITIES: Primary | ICD-10-CM

## 2020-11-09 DIAGNOSIS — M79.602 PARESTHESIA AND PAIN OF BOTH UPPER EXTREMITIES: Primary | ICD-10-CM

## 2020-11-09 DIAGNOSIS — R20.2 PARESTHESIA OF BOTH LEGS: ICD-10-CM

## 2020-11-09 PROCEDURE — 99204 OFFICE O/P NEW MOD 45 MIN: CPT | Performed by: PSYCHIATRY & NEUROLOGY

## 2020-11-09 NOTE — PATIENT INSTRUCTIONS
[facility]   Statement to Patients  April 1, 2014      In an effort to ensure the large volume of patient prescription refills is processed in the most efficient and expeditious manner, we are asking our patients to assist us by calling your Pharmacy for all prescription refills, this will include also your  Mail Order Pharmacy. The pharmacy will contact our office electronically to continue the refill process. Please do not wait until the last minute to call your pharmacy. We need at least 48 hours (2days) to fill prescriptions. We also encourage you to call your pharmacy before going to  your prescription to make sure it is ready. With regard to controlled substance prescription refill requests (narcotic refills) that need to be picked up at our office, we ask your cooperation by providing us with at least 72 hours (3days) notice that you will need a refill. We will not refill narcotic prescription refill requests after 4:00pm on any weekday, Monday through Thursday, or after 2:00pm on Fridays, or on the weekends. We encourage everyone to explore another way of getting your prescription refill request processed using [employer], our patient web portal through our electronic medical record system. [employer] is an efficient and effective way to communicate your medication request directly to the office and  downloadable as an suly on your smart phone . [employer] also features a review functionality that allows you to view your medication list as well as leave messages for your physician. Are you ready to get connected? If so please review the attatched instructions or speak to any of our staff to get you set up right away! Thank you so much for your cooperation. Should you have any questions please contact our Practice Administrator.     The Physicians and Staff,  [facility]

## 2020-11-09 NOTE — PROGRESS NOTES
[facility]   NEW PATIENT EVALUATION/CONSULTATION       PATIENT NAME: Artemio Fam    MRN: [id number]    REASON FOR CONSULTATION: Numbness/tingling b/l upper and lower extremities    11/09/20      Previous records (physician notes, laboratory reports, and radiology reports) and imaging studies were reviewed and summarized. My recommendations will be communicated back to the patient's physician(s) via electronic medical record and/or by [facility] mail. HISTORY OF PRESENT ILLNESS:  Artemio Fam is a 41 y.o.  female presenting for evaluation of distal extremity paresthesias. Patient reports onset of lower extremity paresthesias approximately 7 years ago describe as a burning/tingling or numbness involving both feet symmetrically not extending above the ankles. Symptoms are rather constant. Symptoms are worse when resting or in the evenings. She has noted some slight weakness into the LLE when climbing stairs with associated L knee pain. She endorses some imbalance at times. She has chronic LBP as well without significant radicular symptoms. She also reports h/o hand paresthesias s/p CTS release approximately 9 years ago initially improved with slight worsening burning/swelling, tingling into all fingers and decreased hand  b/l over the past year L>R. She is using Lyrica 150mg BID for neuropathic pain symptoms which helps moderately. Denies cervicalgia or radicular symptoms involving the upper extremities.       PAST MEDICAL HISTORY:  Past Medical History:   Diagnosis Date    Arthritis      in hands    Back pain     Diabetes ([address]     prediabetic    Gout     Hypertension     Morbid obesity ([address]     Neuropathy     Psychiatric disorder     depression    Unspecified sleep apnea        PAST SURGICAL HISTORY:  Past Surgical History:   Procedure Laterality Date    CARDIAC SURG PROCEDURE UNLIST  2009    cardian cath    HX CARPAL TUNNEL RELEASE      both hands,2010    HX GYN      ruel in     HX GYN  11    ROBOTIC ASSISTED TOTAL LAPAROSCOPIC HYSTERECTOMY       FAMILY HISTORY:   Family History   Problem Relation Age of Onset    Heart Disease Father     Cancer Maternal Grandmother         breast cancer    Breast Cancer Maternal Grandmother 61    Hypertension Paternal Grandmother     Cancer Paternal Grandmother         breast    Breast Cancer Paternal Grandmother     No Known Problems Mother     Lupus Sister     Lupus Sister     Crohn's Disease Sister          SOCIAL HISTORY:  Social History     Socioeconomic History    Marital status: SINGLE     Spouse name: Not on file    Number of children: Not on file    Years of education: Not on file    Highest education level: Not on file   Tobacco Use    Smoking status: Former Smoker     Last attempt to quit: 2016     Years since quittin.4    Smokeless tobacco: Never Used   Substance and Sexual Activity    Alcohol use: Yes     Comment: social    Drug use: No    Sexual activity: Yes     Partners: Male     Birth control/protection: None         MEDICATIONS:   Current Outpatient Medications   Medication Sig Dispense Refill    ergocalciferol (ERGOCALCIFEROL) 1,250 mcg (50,000 unit) capsule Take 1 Cap by mouth every seven (7) days. Indications: vitamin D deficiency (high dose therapy) 12 Cap 4    folic acid (FOLVITE) 1 mg tablet Take 1 Tab by mouth daily. 90 Tab 5    methotrexate (RHEUMATREX) 2.5 mg tablet Take 8 Tabs by mouth Every Friday. 96 Tab 0    SITagliptin-metFORMIN (Janumet) 50-1,000 mg per tablet Take 1 Tab by mouth daily.  mometasone (NASONEX) 50 mcg/actuation nasal spray Nasonex 50 mcg/actuation Spray      LYRICA 75 mg capsule 150 mg daily.  COLCRYS 0.6 mg tablet as needed.  hydrochlorothiazide (HYDRODIURIL) 25 mg tablet Take 25 mg by mouth daily.  esomeprazole (NEXIUM) 40 mg capsule Take 40 mg by mouth daily.       buPROPion XL (WELLBUTRIN XL) 150 mg tablet Take 150 mg by mouth every morning. Indications: DEPRESSION ASSOCIATED WITH MANIC DEPRESSIVE DISORDER      metoprolol (LOPRESSOR) 50 mg tablet Take  by mouth two (2) times a day. ALLERGIES:  Allergies   Allergen Reactions    Latex Rash         REVIEW OF SYSTEMS:  10 point ROS reviewed with patient. Please see scanned document under media. PHYSICAL EXAM:  Vital Signs:   Visit Vitals  BP (!) 140/88   Pulse 89   Resp 18   Ht 5' 3\" (1.6 m)   Wt 126.6 kg (279 lb)   LMP 04/17/2013   SpO2 98%   BMI 49.42 kg/m²        General Medical Exam:  General:  Well appearing, comfortable, in no apparent distress. Eyes/ENT: see cranial nerve examination. Neck: No masses appreciated. Full range of motion without tenderness. Respiratory:  Clear to auscultation, good air entry bilaterally. Cardiac:  Regular rate and rhythm, no murmur. GI:  Soft, non-tender, non-distended abdomen. Bowel sounds normal. No masses, organomegaly. Extremities:  No deformities, edema, or skin discoloration. Skin:  No rashes or lesions. Neurological:  · Mental Status:  Alert and oriented to person, place, and time with fluent speech. · Cranial Nerves:   CNII/III/IV/VI: visual fields full to confrontation, EOMI, PERRL, no ptosis or nystagmus. CN V: Facial sensation intact bilaterally, masseter 5/5   CN VII: Facial muscles symmetric and strong   CN VIII: Hears finger rub well bilaterally, intact vestibular function   CN IX/X: Normal palatal movement   CN XI: Full strength shoulder shrug bilaterally   CN XII: Tongue protrusion full and midline without fasciculation or atrophy  · Motor: Normal tone and muscle bulk with no pronator drift.    Individual muscle group testing:  Shoulder abduction:   Left:5/5   Right : 5/5    Shoulder adduction:   Left:5/5   Right : 5/5    Elbow flexion:      Left:5/5   Right : 5/5  Elbow extension:    Left:5/5   Right : 5/5   Wrist flexion:    Left:5/5   Right : 5/5  Wrist extension:    Left:5/5   Right : 5/5  Arm pronation:   Left:5/5   Right : 5/5  Arm supination:   Left:5/5   Right : 5/5    Finger flexion:    Left:5/5   Right : 5/5    Finger extension:   Left:5/5   Right : 5/5   Finger abduction:  Left:5/5   Right : 5/5 (slightly limited by pain/arthralgia)  Finger adduction:   Left:5/5   Right : 5/5  Hip flexion:     Left:5/5   Right : 5/5         Hip extension:   Left:5/5   Right : 5/5    Knee flexion:    Left:5/5   Right : 5/5    Knee extension:   Left:5/5   Right : 5/5    Dorsiflexion:     Left:5/5   Right : 5/5  Plantar flexion:    Left:5/5   Right : 5/5      · MSRs: No crossed adductors or clonus. RIGHT  LEFT   Brachioradialis 1+ 1+   Biceps 1+ 1+   Triceps 1+ 1+   Knee 1+ 1+   Achilles 0 0        Plantar response Downward Downward          · Sensation: Decreased temperature/LP/PP fingers b/l and distal feet to R ankle and L mid-calf. Vibration reduced at the toes b/l to the ankles. Proprioception intact. (-) Romberg, (+)Tinels wrists b/l. · Coordination: No dysmetria. Normal rapid alternating movements; finger-to-nose and heel-to- shin testing are within normal limits. · Gait: Normal native and stress (tandem walking).     Component      Latest Ref Rng & Units 9/16/2020 9/16/2020 9/16/2020 9/16/2020          11:44 AM 11:44 AM 11:44 AM 11:44 AM   Albumin      2.9 - 4.4 g/dL    3.5   Alpha-1-globulin      0.0 - 0.4 g/dL    0.1   ALPHA-2 GLOBULIN      0.4 - 1.0 g/dL    0.8   Beta globulin      0.7 - 1.3 g/dL    1.2   Gamma globulin      0.4 - 1.8 g/dL    1.5   M-Pasha      Not Observed g/dL    Not Observed   Globulin, total      2.2 - 3.9 g/dL    3.7   A/G ratio      0.7 - 1.7    0.9   Please note          Comment   Interpretation (see below)          Comment   Vitamin B12      232 - 1,245 pg/mL   392    Folate      >3.0 ng/mL   10.7    C-Reactive Protein, Qt      0 - 10 mg/L       Sed rate (ESR)      0 - 32 mm/hr       Rheumatoid factor      0.0 - 13.9 IU/mL       TSH      0.450 - 4.500 uIU/mL  2.760 Vitamin B6      2.0 - 32.8 ug/L 8.8        Component      Latest Ref Rng & Units 9/16/2020 9/16/2020 9/16/2020          11:44 AM 11:44 AM 11:44 AM   Albumin      2.9 - 4.4 g/dL      Alpha-1-globulin      0.0 - 0.4 g/dL      ALPHA-2 GLOBULIN      0.4 - 1.0 g/dL      Beta globulin      0.7 - 1.3 g/dL      Gamma globulin      0.4 - 1.8 g/dL      M-Pasha      Not Observed g/dL      Globulin, total      2.2 - 3.9 g/dL      A/G ratio      0.7 - 1.7      Please note            Interpretation (see below)            Vitamin B12      232 - 1,245 pg/mL      Folate      >3.0 ng/mL      C-Reactive Protein, Qt      0 - 10 mg/L   11 (H)   Sed rate (ESR)      0 - 32 mm/hr  60 (H)    Rheumatoid factor      0.0 - 13.9 IU/mL <10.0     TSH      0.450 - 4.500 uIU/mL      Vitamin B6      2.0 - 32.8 ug/L          ASSESSMENT:      ICD-10-CM ICD-9-CM    1. Paresthesia and pain of both upper extremities  R20.2 782.0 EMG LIMITED    M79.601 729.5     M79.602     2. Paresthesia of both legs  R20.2 782.0 EMG LIMITED   41year old pleasant AAF with a h/o seronegative RA, DM, b/l CTS s/p release referred for evaluation of progressive distal lower and upper extremity paresthesias for several years. Examination reveals mixed large/small fiber sensory deficits and reduced distal DTRs most consistent with a length-dependent polyneuropathy. I suspect this is due to her diabetes. Worsening upper extremity paresthesias s/p CTS release is most likely explained by superimposed polyneuropathy now extending to her hands b/l. Will proceed with electrodiagnostic testing for diagnostic confirmation, to establish a baseline and exclude comorbid lumbosacral radiculopathy due to chronic lower back pain. We discussed titration of Lyrica until neuropathic pain symptoms are better controlled. This is currently being managed by her PCP. Will discuss results of testing after her procedure.       PLAN:  · EMG PN protocol (NCS b/l LE and single LUE, NEE LUE/LLE)  · Continue Lyrica for neuropathic pain symptoms-defer titration to her PCP      I have discussed the diagnosis with the patient and the intended plan as seen in the above orders. Patient is in agreement. The patient has received an after-visit summary and questions were answered concerning future plans. Carlos Benson DO  Staff Neurologist  Diplomate, American Board of Psychiatry & Neurology       CC Referring provider:  Shy Sanchez, [address] [place] [address], [address] [place] [address]

## 2020-11-11 ENCOUNTER — VIRTUAL VISIT (OUTPATIENT)
Dept: SLEEP MEDICINE | Age: 40
End: 2020-11-11
Payer: MEDICAID

## 2020-11-11 DIAGNOSIS — Z86.59 H/O: DEPRESSION: ICD-10-CM

## 2020-11-11 DIAGNOSIS — G47.33 OSA (OBSTRUCTIVE SLEEP APNEA): Primary | ICD-10-CM

## 2020-11-11 DIAGNOSIS — U07.1 COVID-19: ICD-10-CM

## 2020-11-11 DIAGNOSIS — I10 ESSENTIAL HYPERTENSION: ICD-10-CM

## 2020-11-11 DIAGNOSIS — G47.33 OSA (OBSTRUCTIVE SLEEP APNEA): ICD-10-CM

## 2020-11-11 PROCEDURE — 99213 OFFICE O/P EST LOW 20 MIN: CPT | Performed by: INTERNAL MEDICINE

## 2020-11-11 NOTE — PROGRESS NOTES
[address] [facility] [place]. [place], [address]  Tel.  [phone]  Fax. [address] [facility] [address]  [place], [address] [facility]  Tel.  [phone]  Fax. [phone] [address] [facility] [address] [address]  Tel.  [phone]  Fax. [phone]       S>    Adán Peters is a 41 y.o. female who was seen by synchronous (real-time) audio-video technology on 11/11/2020. Consent:  She and/or her healthcare decision maker is aware that this patient-initiated Telehealth encounter is a billable service, with coverage as determined by her insurance carrier. She is aware that she may receive a bill and has provided verbal consent to proceed: Yes    I was at home while conducting this encounter. Patient verified with 's License. She reports no problems using the device. She reports of being compliant on current therapy (PAP download is not available for review) online review of compliance data indicates device was last used about a month previously. The following problems are identified:    Drowsiness no Problems exhaling no   Snoring no Forget to put on no   Mask Comfortable yes Can't fall asleep no   Dry Mouth no Mask falls off no   Air Leaking no Frequent awakenings no         She admits that her sleep has improved on PAP therapy using nasal pillows mask and heated tubing. Patient stated that she has an appointment with [facility] to get new supplies. Allergies   Allergen Reactions    Latex Rash       She has a current medication list which includes the following prescription(s): ergocalciferol, folic acid, methotrexate, janumet, mometasone, lyrica, colcrys, hydrochlorothiazide, esomeprazole, bupropion xl, and metoprolol tartrate. .      She  has a past medical history of Arthritis, Back pain, Diabetes ([address] Gout, Hypertension, Morbid obesity ([address] Neuropathy, Psychiatric disorder, and Unspecified sleep apnea.     Quail Sleepiness Score: 4   and Modified F. O.S.Q. Score Total / 2: 15.5   which reflect improved sleep quality over therapy time. O>        Patient-Reported Vitals 11/11/2020   Patient-Reported Weight 277   Patient-Reported Pulse 89   Patient-Reported Systolic  349   Patient-Reported Diastolic 88       Physical Exam completed by visual and auditory observation of patient with verbal input from patient. General:   Alert, oriented, not in acute distress   Eyes:  Anicteric Sclerae; no obvious strabismus   Nose:  No obvious nasal septum deviation    Neck:   Midline trachea, no visible mass   Chest/Lungs:  Respiratory effort normal, no visualized signs of difficulty breathing or respiratory distress   CVS:  No JVD   Extremities:  No obvious rashes noted on face, neck, or hands   Neuro:  No facial asymmetry, no focal deficits; no obvious tremor    Psych:  Normal affect,  normal countenance         A>    ICD-10-CM ICD-9-CM    1. RILEY (obstructive sleep apnea)  G47.33 327.23 SLEEP LAB (PAP TITRATION)   2. BMI 45.0-49.9, adult (HCC)  Z68.42 V85.42    3. Essential hypertension  I10 401.9    4. H/O: depression  Z86.59 V11.8    5. COVID-19  U07.1 079.89 NOVEL CORONAVIRUS (COVID-19)     AHI = 48.2 (2016).  On Resmed :  APAP 6 - 20 cmH2O. Compliant:      yes    Therapeutic Response:  Positive    P>    * Patient is using her PAP device regularly and benefiting form therapy,  continued use of the device is advised.     Orders Placed This Encounter    NOVEL CORONAVIRUS (COVID-19)     Standing Status:   Future     Standing Expiration Date:   2/11/2021     Scheduling Instructions:      1) Due to current limited availability of the COVID-19 PCR test, tests will be prioritized and may not be completed.              2) Order only if the test result will change clinical management or necessary for a return to mission-critical employment decision.              3) Print and instruct patient to adhere to [facility] home isolation program. (Link Above)              4) Set up or refer patient for a monitoring program.              5) Have patient sign up for and leverage MyChart (if not previously done). Order Specific Question:   Status     Answer:   Asymptomatic/Surveillance(e.g. pre-op/pre-procedure/pre-delivery/transfer)     Order Specific Question:   Reason for Test     Answer:   Upcoming elective surgery/procedure/delivery, return to work, or discharge to another facility    SLEEP LAB (PAP TITRATION)     Standing Status:   Future     Standing Expiration Date:   2/11/2021     Order Specific Question:   Reason for Exam     Answer:   RILEY       * She is aware of the relationship between RILEY and HTN which is unstable as is her mood (patient is currently on anti-depressant therapy). * We have recommended a dedicated weight loss through appropriate diet and an exercise regiment as significant weight reduction has been shown to reduce severity of obstructive sleep apnea. *   Follow-up and Dispositions    · Return in about 1 year (around 11/11/2021), or if symptoms worsen or fail to improve. * She was asked to contact our office for any problems regarding PAP therapy. * Counseling was provided regarding the importance of regular PAP use and on proper sleep hygiene and safe driving. * Re-enforced proper and regular cleaning for the device. Thank you for allowing us to participate in your patient's medical care. Pursuant to the emergency declaration under the [facility], [facility] waiver authority and the [employer] and [employer] General Act, this Virtual  Visit was conducted, with patient's consent, to reduce the patient's risk of exposure to COVID-19 and provide continuity of care for an established patient. Services were provided through a video synchronous discussion virtually to substitute for in-person clinic visit. Mary Herrera MD, FAASM  Electronically signed.  11/11/20

## 2020-11-11 NOTE — PATIENT INSTRUCTIONS
217 [facility] [place]. 1668 Steve [facility], [address] Tel.  [phone] Fax. 100 [facility] 60 [place], [address] Tel.  [phone] Fax. [phone] [address] Alma Singh Tel.  [phone] Fax. [phone] Learning About CPAP for Sleep Apnea What is CPAP? CPAP is a small machine that you use at home every night while you sleep. It increases air pressure in your throat to keep your airway open. When you have sleep apnea, this can help you sleep better so you feel much better. CPAP stands for \"continuous positive airway pressure. \" The CPAP machine will have one of the following: · A mask that covers your nose and mouth · Prongs that fit into your nose · A mask that covers your nose only, the most common type. This type is called NCPAP. The N stands for \"nasal.\" Why is it done? CPAP is usually the best treatment for obstructive sleep apnea. It is the first treatment choice and the most widely used. Your doctor may suggest CPAP if you have: · Moderate to severe sleep apnea. · Sleep apnea and coronary artery disease (CAD) or heart failure. How does it help? · CPAP can help you have more normal sleep, so you feel less sleepy and more alert during the daytime. · CPAP may help keep heart failure or other heart problems from getting worse. · NCPAP may help lower your blood pressure. · If you use CPAP, your bed partner may also sleep better because you are not snoring or restless. What are the side effects? Some people who use CPAP have: · A dry or stuffy nose and a sore throat. · Irritated skin on the face. · Sore eyes. · Bloating. If you have any of these problems, work with your doctor to fix them. Here are some things you can try: · Be sure the mask or nasal prongs fit well. · See if your doctor can adjust the pressure of your CPAP. · If your nose is dry, try a humidifier. · If your nose is runny or stuffy, try decongestant medicine or a steroid nasal spray. If these things do not help, you might try a different type of machine. Some machines have air pressure that adjusts on its own. Others have air pressures that are different when you breathe in than when you breathe out. This may reduce discomfort caused by too much pressure in your nose. Where can you learn more? Go to Bonsai AI.be Enter Z632 in the search box to learn more about \"Learning About CPAP for Sleep Apnea. \"  
© 9805-4122 Healthwise, Incorporated. Care instructions adapted under license by [address] [place] [address] (which disclaims liability or warranty for this information). This care instruction is for use with your licensed healthcare professional. If you have questions about a medical condition or this instruction, always ask your healthcare professional. [address] any warranty or liability for your use of this information. Content Version: 7.8.27291; Last Revised: January 11, 2010 PROPER SLEEP HYGIENE What to avoid · Do not have drinks with caffeine, such as coffee or black tea, for 8 hours before bed. · Do not smoke or use other types of tobacco near bedtime. Nicotine is a stimulant and can keep you awake. · Avoid drinking alcohol late in the evening, because it can cause you to wake in the middle of the night. · Do not eat a big meal close to bedtime. If you are hungry, eat a light snack. · Do not drink a lot of water close to bedtime, because the need to urinate may wake you up during the night. · Do not read or watch TV in bed. Use the bed only for sleeping and sexual activity. What to try · Go to bed at the same time every night, and wake up at the same time every morning. Do not take naps during the day. · Keep your bedroom quiet, dark, and cool. · Get regular exercise, but not within 3 to 4 hours of your bedtime. Karla Barrow · Sleep on a comfortable pillow and mattress. · If watching the clock makes you anxious, turn it facing away from you so you cannot see the time. · If you worry when you lie down, start a worry book. Well before bedtime, write down your worries, and then set the book and your concerns aside. · Try meditation or other relaxation techniques before you go to bed. · If you cannot fall asleep, get up and go to another room until you feel sleepy. Do something relaxing. Repeat your bedtime routine before you go to bed again. · Make your house quiet and calm about an hour before bedtime. Turn down the lights, turn off the TV, log off the computer, and turn down the volume on music. This can help you relax after a busy day. Drowsy Driving: The [facility] cites drowsiness as a causing factor in more than 562,995 police reported crashes annually, resulting in 76,000 injuries and 1,500 deaths. Other surveys suggest 55% of people polled have driven while drowsy in the past year, 23% had fallen asleep but not crashed, 3% crashed, and 2% had and accident due to drowsy driving. Who is at risk? Young Drivers: One study of drowsy driving accidents states that 55% of the drivers were under 25 years. Of those, 75% were male. Shift Workers and Travelers: People who work overnight or travel across time zones frequently are at higher risk of experiencing Circadian Rhythm Disorders. They are trying to work and function when their body is programed to sleep. Sleep Deprived: Lack of sleep has a serious impact on your ability to pay attention or focus on a task. Consistently getting less than the average of 8 hours your body needs creates partial or cumulative sleep deprivation.   
Untreated Sleep Disorders: Sleep Apnea, Narcolepsy, R.L.S., and other sleep disorders (untreated) prevent a person from getting enough restful sleep. This leads to excessive daytime sleepiness and increases the risk for drowsy driving accidents by up to 7 times. Medications / Alcohol: Even over the counter medications can cause drowsiness. Medications that impair a drivers attention should have a warning label. Alcohol naturally makes you sleepy and on its own can cause accidents. Combined with excessive drowsiness its effects are amplified. Signs of Drowsy Driving: * You don't remember driving the last few miles * You may drift out of your darcy * You are unable to focus and your thoughts wander * You may yawn more often than normal 
 * You have difficulty keeping your eyes open / nodding off * Missing traffic signs, speeding, or tailgating Prevention-  
Good sleep hygiene, lifestyle and behavioral choices have the most impact on drowsy driving. There is no substitute for sleep and the average person requires 8 hours nightly. If you find yourself driving drowsy, stop and sleep. Consider the sleep hygiene tips provided during your visit as well. Medication Refill Policy: Refills for all medications require 1 week advance notice. Please have your pharmacy fax a refill request. We are unable to fax, or call in \"controled substance\" medications and you will need to pick these prescriptions up from our office. Responsa Activation Thank you for requesting access to Responsa. Please follow the instructions below to securely access and download your online medical record. Responsa allows you to send messages to your doctor, view your test results, renew your prescriptions, schedule appointments, and more. How Do I Sign Up? 1. In your internet browser, go to [email or web address]. 2. Click on the First Time User? Click Here link in the Sign In box. You will see the New Member Sign Up page. 3. Enter your Responsa Access Code exactly as it appears below.  You will not need to use this code after youve completed the sign-up process. If you do not sign up before the expiration date, you must request a new code. PlayScape Access Code: Activation code not generated Current PlayScape Status: Active (This is the date your PlayScape access code will ) 4. Enter the last four digits of your Social Security Number (xxxx) and Date of Birth (mm/dd/yyyy) as indicated and click Submit. You will be taken to the next sign-up page. 5. Create a [employer] ID. This will be your PlayScape login ID and cannot be changed, so think of one that is secure and easy to remember. 6. Create a PlayScape password. You can change your password at any time. 7. Enter your Password Reset Question and Answer. This can be used at a later time if you forget your password. 8. Enter your e-mail address. You will receive e-mail notification when new information is available in [address]. 9. Click Sign Up. You can now view and download portions of your medical record. 10. Click the Download Summary menu link to download a portable copy of your medical information. Additional Information If you have questions, please call [phone]. Remember, PlayScape is NOT to be used for urgent needs. For medical emergencies, dial 911.

## 2020-11-16 ENCOUNTER — TELEPHONE (OUTPATIENT)
Dept: SLEEP MEDICINE | Age: 40
End: 2020-11-16

## 2020-11-16 ENCOUNTER — DOCUMENTATION ONLY (OUTPATIENT)
Dept: SLEEP MEDICINE | Age: 40
End: 2020-11-16

## 2020-11-23 ENCOUNTER — TELEPHONE (OUTPATIENT)
Dept: NEUROLOGY | Age: 40
End: 2020-11-23

## 2020-11-23 NOTE — TELEPHONE ENCOUNTER
----- Message from Lieutenant Dash sent at 11/23/2020  4:04 PM EST -----  Regarding: Dr. Bay Avendaño  Patient return call    Caller's first and last name and relationship (if not the patient): n/a      Best contact number(s): [phone]       Whose call is being returned: Eddy French      Details to clarify the request:      Lieutenant Dash

## 2020-12-01 ENCOUNTER — TELEPHONE (OUTPATIENT)
Dept: SLEEP MEDICINE | Age: 40
End: 2020-12-01

## 2020-12-03 ENCOUNTER — OFFICE VISIT (OUTPATIENT)
Dept: OBGYN CLINIC | Age: 40
End: 2020-12-03
Payer: COMMERCIAL

## 2020-12-03 VITALS
SYSTOLIC BLOOD PRESSURE: 146 MMHG | WEIGHT: 271 LBS | DIASTOLIC BLOOD PRESSURE: 84 MMHG | BODY MASS INDEX: 48.02 KG/M2 | HEIGHT: 63 IN

## 2020-12-03 DIAGNOSIS — Z01.419 WELL WOMAN EXAM: Primary | ICD-10-CM

## 2020-12-03 DIAGNOSIS — Z11.3 SCREENING EXAMINATION FOR VENEREAL DISEASE: ICD-10-CM

## 2020-12-03 DIAGNOSIS — R35.0 FREQUENCY OF URINATION: ICD-10-CM

## 2020-12-03 DIAGNOSIS — Z11.51 SCREENING FOR HUMAN PAPILLOMAVIRUS: ICD-10-CM

## 2020-12-03 PROCEDURE — 99386 PREV VISIT NEW AGE 40-64: CPT | Performed by: OBSTETRICS & GYNECOLOGY

## 2020-12-03 NOTE — PATIENT INSTRUCTIONS
Vaginal Yeast Infection: Care Instructions  Your Care Instructions     A vaginal yeast infection is caused by too many yeast cells in the vagina. This is common in women of all ages. Itching, vaginal discharge and irritation, and other symptoms can bother you. But yeast infections don't often cause other health problems. Some medicines can increase your risk of getting a yeast infection. These include antibiotics, birth control pills, hormones, and steroids. You may also be more likely to get a yeast infection if you are pregnant, have diabetes, douche, or wear tight clothes. With treatment, most yeast infections get better in 2 to 3 days. Follow-up care is a key part of your treatment and safety. Be sure to make and go to all appointments, and call your doctor if you are having problems. It's also a good idea to know your test results and keep a list of the medicines you take. How can you care for yourself at home? · Take your medicines exactly as prescribed. Call your doctor if you think you are having a problem with your medicine. · Ask your doctor about over-the-counter (OTC) medicines for yeast infections. They may cost less than prescription medicines. If you use an OTC treatment, read and follow all instructions on the label. · Do not use tampons while using a vaginal cream or suppository. The tampons can absorb the medicine. Use pads instead. · Wear loose cotton clothing. Do not wear nylon or other fabric that holds body heat and moisture close to the skin. · Try sleeping without underwear. · Do not scratch. Relieve itching with a cold pack or a cool bath. · Do not wash your vaginal area more than once a day. Use plain water or a mild, unscented soap. Air-dry the vaginal area. · Change out of wet swimsuits after swimming. · Do not have sex until you have finished your treatment. · Do not douche. When should you call for help?    Call your doctor now or seek immediate medical care if:    · You have unexpected vaginal bleeding.     · You have new or increased pain in your vagina or pelvis. Watch closely for changes in your health, and be sure to contact your doctor if:    · You have a fever.     · You are not getting better after 2 days.     · Your symptoms come back after you finish your medicines. Where can you learn more? Go to http://www.gray.com/  Enter N508 in the search box to learn more about \"Vaginal Yeast Infection: Care Instructions. \"  Current as of: November 8, 2019               Content Version: 12.6  © 9422-9193 Dissolve. Care instructions adapted under license by [employer] (which disclaims liability or warranty for this information). If you have questions about a medical condition or this instruction, always ask your healthcare professional. [address] any warranty or liability for your use of this information. Frequent Urination: Care Instructions  Your Care Instructions  An urge to urinate frequently but usually passing only small amounts of urine is a common symptom of urinary problems, such as urinary tract infections. The bladder may become inflamed. This can cause the urge to urinate. You may try to urinate more often than usual to try to soothe that urge. Frequent urination also may be caused by sexually transmitted infections (STIs) or kidney stones. Or it may happen when something irritates the tube that carries urine from the bladder to the outside of the body (urethra). It may also be a sign of diabetes. The cause may be hard to find. You may need tests. Follow-up care is a key part of your treatment and safety. Be sure to make and go to all appointments, and call your doctor if you are having problems. It's also a good idea to know your test results and keep a list of the medicines you take. How can you care for yourself at home? · Drink extra water for the next day or two.  This will help make the urine less concentrated. (If you have kidney, heart, or liver disease and have to limit fluids, talk with your doctor before you increase the amount of fluids you drink.)  · Avoid drinks that are carbonated or have caffeine. They can irritate the bladder. For women:  · Urinate right after you have sex. · After you go to the bathroom, wipe from front to back. · Avoid douches, bubble baths, and feminine hygiene sprays. And avoid other feminine hygiene products that have deodorants. When should you call for help? Call your doctor now or seek immediate medical care if:    · You have new symptoms, such as fever, nausea, or vomiting.     · You have new or worse symptoms of a urinary problem. For example:  ? You have blood or pus in your urine. ? You have chills or body aches. ? It hurts to urinate. ? You have groin or belly pain. ? You have pain in your back just below your rib cage (the flank area). Watch closely for changes in your health, and be sure to contact your doctor if you feel thirstier than usual.  Where can you learn more? Go to http://www.gray.com/  Enter I431 in the search box to learn more about \"Frequent Urination: Care Instructions. \"  Current as of: June 29, 2020               Content Version: 12.6  © 6199-3790 Flocasts, Incorporated. Care instructions adapted under license by [employer] (which disclaims liability or warranty for this information). If you have questions about a medical condition or this instruction, always ask your healthcare professional. [place] [address] any warranty or liability for your use of this information.

## 2020-12-03 NOTE — PROGRESS NOTES
Annual exam    Huy Ricketts is a 41 y.o. presenting for annual exam.     Her main concerns today include vaginal itching/discharge and urinary frequency. H/o recurrent yeast infections. Unsure about last A1c and BG control. Pt is s/p robotic TLH (without BSO) with Dr. Shanae Ahmadi in  for AUB. Desires STI testing. PMH - morbid obesity (BMI 48), HTN, DM, RA, RILEY, depression. Fam h/o breast cancer (both mgma and pgma). Ob/Gyn Hx:   A0 -3  (twins)  Menses- s/p hyst  Contraception-surgical  STI- chlamydia  ? SA-yes    Health maintenance:  Pap-3/4/16 NILM  Mammo-20 B1  Gardasil-denies    Past Medical History:   Diagnosis Date    Arthritis      in hands    Back pain     Diabetes ([address]     prediabetic    Gout     Hypertension     Morbid obesity ([address]     Neuropathy     Psychiatric disorder     depression    Unspecified sleep apnea        Past Surgical History:   Procedure Laterality Date    CARDIAC SURG PROCEDURE UNLIST      cardian cath    HX CARPAL TUNNEL RELEASE      both hands,    HX GYN      novasure in     HX GYN  11    ROBOTIC ASSISTED TOTAL LAPAROSCOPIC HYSTERECTOMY       Family History   Problem Relation Age of Onset    Heart Disease Father     Cancer Maternal Grandmother         breast cancer    Breast Cancer Maternal Grandmother 61    Hypertension Paternal Grandmother     Cancer Paternal Grandmother         breast    Breast Cancer Paternal Grandmother     No Known Problems Mother     Lupus Sister     Lupus Sister     Crohn's Disease Sister        Social History     Socioeconomic History    Marital status: SINGLE     Spouse name: Not on file    Number of children: Not on file    Years of education: Not on file    Highest education level: Not on file   Occupational History    Not on file   Social Needs    Financial resource strain: Not on file    Food insecurity     Worry: Not on file     Inability: Not on file   Alter-G needs Medical: Not on file     Non-medical: Not on file   Tobacco Use    Smoking status: Former Smoker     Last attempt to quit: 2016     Years since quittin.4    Smokeless tobacco: Never Used   Substance and Sexual Activity    Alcohol use: Yes     Comment: social    Drug use: No    Sexual activity: Yes     Partners: Male     Birth control/protection: None   Lifestyle    Physical activity     Days per week: Not on file     Minutes per session: Not on file    Stress: Not on file   Relationships    Social connections     Talks on phone: Not on file     Gets together: Not on file     Attends [employer] service: Not on file     Active member of club or organization: Not on file     Attends meetings of clubs or organizations: Not on file     Relationship status: Not on file    Intimate partner violence     Fear of current or ex partner: Not on file     Emotionally abused: Not on file     Physically abused: Not on file     Forced sexual activity: Not on file   Other Topics Concern    Not on file   Social History Narrative    Not on file       Current Outpatient Medications   Medication Sig Dispense Refill    ergocalciferol (ERGOCALCIFEROL) 1,250 mcg (50,000 unit) capsule Take 1 Cap by mouth every seven (7) days. Indications: vitamin D deficiency (high dose therapy) 12 Cap 4    folic acid (FOLVITE) 1 mg tablet Take 1 Tab by mouth daily. 90 Tab 5    methotrexate (RHEUMATREX) 2.5 mg tablet Take 8 Tabs by mouth Every Friday. 96 Tab 0    SITagliptin-metFORMIN (Janumet) 50-1,000 mg per tablet Take 1 Tab by mouth daily.  mometasone (NASONEX) 50 mcg/actuation nasal spray Nasonex 50 mcg/actuation Spray      LYRICA 75 mg capsule 150 mg daily.  COLCRYS 0.6 mg tablet as needed.  hydrochlorothiazide (HYDRODIURIL) 25 mg tablet Take 25 mg by mouth daily.  esomeprazole (NEXIUM) 40 mg capsule Take 40 mg by mouth daily.       buPROPion XL (WELLBUTRIN XL) 150 mg tablet Take 150 mg by mouth every morning. Indications: DEPRESSION ASSOCIATED WITH MANIC DEPRESSIVE DISORDER      metoprolol (LOPRESSOR) 50 mg tablet Take  by mouth two (2) times a day.          Allergies   Allergen Reactions    Latex Rash       Review of Systems - History obtained from the patient  Constitutional: negative for weight loss, fever, night sweats  HEENT: negative for hearing loss, earache, congestion, snoring, sorethroat  CV: negative for chest pain, palpitations, edema  Resp: negative for cough, shortness of breath, wheezing  GI: negative for change in bowel habits, abdominal pain, black or bloody stools  : negative for dysuria, hematuria, +vaginal discharge/pruritis and urinary frequency  MSK: negative for back pain, joint pain, muscle pain  Breast: negative for breast lumps, nipple discharge, galactorrhea  Skin :negative for itching, rash, hives  Neuro: negative for dizziness, headache, confusion, weakness  Psych: negative for anxiety, depression, change in mood  Heme/lymph: negative for bleeding, bruising, pallor    Physical Exam  Visit Vitals  BP (!) 146/84   Ht 5' 3\" (1.6 m)   Wt 271 lb (122.9 kg)   LMP 04/17/2013   BMI 48.01 kg/m²     Constitutional  · Appearance: well-nourished, well developed, alert, in no acute distress, +morbidly obese    HENT  · Head and Face: appears normal    Neck  · Inspection/Palpation: normal appearance, no masses or tenderness  · Lymph Nodes: no lymphadenopathy present  · Thyroid: gland size normal, nontender, no nodules or masses present on palpation    Chest  · Respiratory Effort: non-labored breathing  · Auscultation: CTAB, normal breath sounds    Cardiovascular  · Heart:  · Auscultation: regular rate and rhythm without murmur  · Extremities: no peripheral edema    Breasts  · Inspection of Breasts: breasts symmetrical, no skin changes, no discharge present, nipple appearance normal, no skin retraction present  · Palpation of Breasts and Axillae: no masses present on palpation, no breast tenderness  · Axillary Lymph Nodes: no lymphadenopathy present    Gastrointestinal  · Abdominal Examination: abdomen non-tender to palpation, normal bowel sounds, no masses present  · Liver and spleen: no hepatomegaly present, spleen not palpable  · Hernias: no hernias identified    Genitourinary  · External Genitalia: normal appearance for age, no discharge present, no tenderness present, no inflammatory lesions present, no masses present, no atrophy present  · Vagina: normal vaginal vault without central or paravaginal defects, no discharge present, no inflammatory lesions present, no masses present  · Bladder: non-tender to palpation  · Urethra: appears normal  · Cervix: normal   · Uterus: normal size, shape and consistency  · Adnexa: no adnexal tenderness present, no adnexal masses present  · Perineum: perineum within normal limits, no evidence of trauma, no rashes or skin lesions present    Skin  · General Inspection: no rash, no lesions identified    Neurologic/Psychiatric  · Mental Status:  · Orientation: grossly oriented to person, place and time  · Mood and Affect: mood normal, affect appropriate      Assessment/Plan:  41 y.o. G P A presenting for annual exam. Overall doing well. +vaginal discharge/pruritis and urinary frequency.     Health Maintenance:  - re: diet, exercise, healthy lifestyle, weight loss  -pap/HPV of cuff given reported h/o abnl paps prior to hysterectomy, unsure if ever had HSIL, but does not think so  -STI screening (serum and vaginal)  -repeat mammo 1 year  -nuswab --> treatment pending results  -urine dip with blood --> will send urine culture  -avoidance of bladder irritants  -encouraged good BG control, advised follow up with PCP    RTC: 1 year for AE or sooner prkimberlyn Ahmadi MD  12/3/2020  12:39 PM

## 2020-12-05 LAB
BACTERIA SPEC CULT: NORMAL
COMMENT, 144067: NORMAL
HCV AB S/CO SERPL IA: 0.1 S/CO RATIO (ref 0–0.9)
SERVICE CMNT-IMP: NORMAL

## 2020-12-07 LAB
A VAGINAE DNA VAG QL NAA+PROBE: ABNORMAL SCORE
BVAB2 DNA VAG QL NAA+PROBE: ABNORMAL SCORE
C ALBICANS DNA VAG QL NAA+PROBE: NEGATIVE
C GLABRATA DNA VAG QL NAA+PROBE: POSITIVE
HBV SURFACE AG SER QL: <0.1 INDEX
HBV SURFACE AG SER QL: NEGATIVE
HIV 1+2 AB+HIV1 P24 AG SERPL QL IA: NONREACTIVE
HIV12 RESULT COMMENT, HHIVC: NORMAL
MEGA1 DNA VAG QL NAA+PROBE: ABNORMAL SCORE
T PALLIDUM AB SER QL IA: NON REACTIVE

## 2020-12-07 RX ORDER — FLUCONAZOLE 150 MG/1
150 TABLET ORAL
Qty: 1 TAB | Refills: 1 | Status: SHIPPED | OUTPATIENT
Start: 2020-12-07 | End: 2020-12-07

## 2020-12-07 NOTE — PROGRESS NOTES
Urine culture - no growth    HIV, HepB, HepC neg    Nuswab vaginitis --> + yeast (glabrata). Please inform patient and send Rx for diflucan 150mg PO x1 (with 1 refill) to patient's pharmacy.

## 2020-12-10 LAB
C TRACH RRNA CVX QL NAA+PROBE: NEGATIVE
CYTOLOGIST CVX/VAG CYTO: NORMAL
CYTOLOGY CVX/VAG DOC CYTO: NORMAL
CYTOLOGY CVX/VAG DOC THIN PREP: NORMAL
DX ICD CODE: NORMAL
HPV I/H RISK 1 DNA CVX QL PROBE+SIG AMP: NEGATIVE
Lab: NORMAL
N GONORRHOEA RRNA CVX QL NAA+PROBE: NEGATIVE
OTHER STN SPEC: NORMAL
STAT OF ADQ CVX/VAG CYTO-IMP: NORMAL
T VAGINALIS RRNA SPEC QL NAA+PROBE: NEGATIVE

## 2020-12-17 DIAGNOSIS — M06.09 SERONEGATIVE RHEUMATOID ARTHRITIS OF MULTIPLE SITES (HCC): ICD-10-CM

## 2020-12-17 RX ORDER — METHOTREXATE 2.5 MG/1
20 TABLET ORAL
Qty: 96 TAB | Refills: 0 | Status: SHIPPED | OUTPATIENT
Start: 2020-12-18 | End: 2021-02-16

## 2020-12-17 RX ORDER — METHOTREXATE 2.5 MG/1
20 TABLET ORAL
Qty: 96 TAB | Refills: 0 | Status: SHIPPED | OUTPATIENT
Start: 2020-12-18 | End: 2020-12-17 | Stop reason: SDUPTHER

## 2021-01-06 ENCOUNTER — TELEPHONE (OUTPATIENT)
Dept: SLEEP MEDICINE | Age: 41
End: 2021-01-06

## 2021-01-06 NOTE — TELEPHONE ENCOUNTER
Patient should continue current therapy and return for follow-up in 1 year or earlier if she has any sleep concerns.

## 2021-01-06 NOTE — TELEPHONE ENCOUNTER
The patient phoned the office to update her insurance and stated that she would like to have [address] [place] [address] as her supply company moving forward.

## 2021-01-06 NOTE — TELEPHONE ENCOUNTER
The patient stated that she refuses to have any COVID testing done. Therefor she will not have her titration study.

## 2021-01-20 ENCOUNTER — DOCUMENTATION ONLY (OUTPATIENT)
Dept: SLEEP MEDICINE | Age: 41
End: 2021-01-20

## 2021-01-21 ENCOUNTER — TELEPHONE (OUTPATIENT)
Dept: SLEEP MEDICINE | Age: 41
End: 2021-01-21

## 2021-01-21 NOTE — TELEPHONE ENCOUNTER
Received call from Burak Frazier at [facility] office that they received a repair/replace order and they are not in-network with plan. Reviewed chart - patient covered under [address] [facility] Dual Complete as of 1/1/2021 and Enedina Arnett accepts her plan. Per Dr. Hill Chaudhry, patient was non-compliant at last visit and titration study was ordered. Recent compliance record shows patient has used device 1 out of 55 days. Called patient to inform her of above. She declines to schedule a titration study until after the pandemic is over. She stated that she uses her device nightly and is only in need of supplies. Since we are not able to accurately do a remote download, an appointment for download only is scheduled for 1/26/2021. Will address supply order then.

## 2021-01-21 NOTE — PROGRESS NOTES
Patient was non-compliant at last visit and will need sleep testing prior to prescription of new device if indicated.

## 2021-01-26 ENCOUNTER — DOCUMENTATION ONLY (OUTPATIENT)
Dept: SLEEP MEDICINE | Age: 41
End: 2021-01-26

## 2021-01-26 ENCOUNTER — TELEPHONE (OUTPATIENT)
Dept: SLEEP MEDICINE | Age: 41
End: 2021-01-26

## 2021-01-26 DIAGNOSIS — G47.33 OSA (OBSTRUCTIVE SLEEP APNEA): Primary | ICD-10-CM

## 2021-01-26 NOTE — TELEPHONE ENCOUNTER
Orders Placed This Encounter    AMB SUPPLY ORDER     Diagnosis: (G47.33) RILEY (obstructive sleep apnea)  (primary encounter diagnosis)     Replacement Supplies for Positive Airway Pressure Therapy Device:   Duration of need: 99 months.  Nasal Pillows Combo Mask (Replace) 2 per month.  Nasal Pillows (Replace) 2 per month.  Headgear 1 every 6 months.  Tubing with heating element 1 every 3 months.  Filter(s) Disposable 2 per month.  Filter(s) Non-Disposable 1 every 6 months. .   [facility] for Humidifier (Replace) 1 every 6 months. Tawanda Sotelo MD, FAASM; NPI: [id number]    Electronically signed.  Date:- 01/26/21

## 2021-02-16 DIAGNOSIS — M06.09 SERONEGATIVE RHEUMATOID ARTHRITIS OF MULTIPLE SITES (HCC): ICD-10-CM

## 2021-02-16 RX ORDER — METHOTREXATE 2.5 MG/1
TABLET ORAL
Qty: 96 TAB | Refills: 0 | Status: SHIPPED | OUTPATIENT
Start: 2021-02-16 | End: 2021-04-23 | Stop reason: SDUPTHER

## 2021-03-05 ENCOUNTER — VIRTUAL VISIT (OUTPATIENT)
Dept: SLEEP MEDICINE | Age: 41
End: 2021-03-05
Payer: MEDICARE

## 2021-03-05 ENCOUNTER — DOCUMENTATION ONLY (OUTPATIENT)
Dept: SLEEP MEDICINE | Age: 41
End: 2021-03-05

## 2021-03-05 VITALS — HEIGHT: 63 IN | WEIGHT: 261 LBS | BODY MASS INDEX: 46.25 KG/M2

## 2021-03-05 DIAGNOSIS — G47.33 OSA (OBSTRUCTIVE SLEEP APNEA): Primary | ICD-10-CM

## 2021-03-05 DIAGNOSIS — Z86.59 H/O: DEPRESSION: ICD-10-CM

## 2021-03-05 DIAGNOSIS — I10 ESSENTIAL HYPERTENSION: ICD-10-CM

## 2021-03-05 PROCEDURE — 99213 OFFICE O/P EST LOW 20 MIN: CPT | Performed by: INTERNAL MEDICINE

## 2021-03-05 NOTE — PROGRESS NOTES
[address] [place]. [place], [address]  Tel.  [phone]  Fax. [address] [facility] [place]  [place], [address] [facility]  Tel.  [phone]  Fax. [phone] [address] [place]   Tel.  [phone]  Fax. Λουτράκι Esteban (: 1980) is a 41 y.o. female, established patient, seen for positive airway pressure follow-up and evaluation of the following chief complaint(s):   Sleep Problem (Yearly F/U, send link to: [phone])       Omer Ford was last seen by me on 2020, prior notes reviewed in detail. Split-Night Polysomnogram (PSG) performed on 2015 showed an AHI of 48.2/hr with a lowest SpO2 of 89%, she was optimally titrated on CPAP Therapy. ASSESSMENT/PLAN:    ICD-10-CM ICD-9-CM    1. RILEY (obstructive sleep apnea)  G47.33 327.23 AMB SUPPLY ORDER   2. Essential hypertension  I10 401.9    3. H/O: depression  Z86.59 V11.8    4. BMI 45.0-49.9, adult (HCC)  Z68.42 V85.42        On ResMed:  AirSense 10 AutoSet      Settings:  Min EPAP: 6 cmH2O  Max EPAP: 20 cmH2O    EPR: 2    1. Sleep Apnea -   * She is adherent with PAP therapy and PAP continues to benefit patient and remains necessary for control of her sleep apnea. Continue on current pressures    * Supplies for his device were ordered as noted below:    Orders Placed This Encounter    AMB SUPPLY ORDER     Diagnosis: (G47.33) RILEY (obstructive sleep apnea)  (primary encounter diagnosis)     Replacement Supplies for Positive Airway Pressure Therapy Device:   Duration of need: 99 months.  Nasal Pillows Combo Mask (Replace) 2 per month.  Nasal Pillows (Replace) 2 per month.  Headgear 1 every 6 months.  Tubing with heating element 1 every 3 months.  Filter(s) Disposable 2 per month.  Filter(s) Non-Disposable 1 every 6 months. .   [address] [facility] for Humidifier (Replace) 1 every 6 months.       Kian Lechuga MD, Brad Fregoso; NPI: [id number]    Electronically signed. Date:- 03/05/21     * Counseling was provided regarding the importance of regular PAP use with emphasis on ensuring sufficient total sleep time, proper sleep hygiene, and safe driving. * Re-enforced proper and regular cleaning for the device. * She was asked to contact our office for any problems regarding PAP therapy. 2. Recommended a dedicated weight loss program through appropriate diet and exercise regimen as significant weight reduction has been shown to reduce severity of obstructive sleep apnea. Follow-up and Dispositions    · Return for follow-up with sleep provider in 1 yr or as needed. SUBJECTIVE/OBJECTIVE:    She  is seen today for follow up on PAP device and reports no problems using the device. The following concerns identified:    Drowsiness no Problems exhaling no   Snoring no Forget to put on no   Mask Comfortable yes Can't fall asleep no   Dry Mouth no Mask falls off no   Air Leaking no Frequent awakenings no       She admits that her sleep has improved on PAP therapy using nasal pillow mask and heated tubing. Review of device download indicated:    Usage Days >= 4 hours 100 %  Median Usage  8 hour 3 minutes    Median Device Pressure 7.8 cmH2O  Device Pressure 95% 10.4 cmH2O    Median Leak 0.2 L/min  95% Leak 8.8 L/min    Average AHI: 1.1 /hr which reflects improved sleep breathing condition. [place] Sleepiness Score: 3   Modified F.O.S.Q. Score Total / 2: 16       Sleep Review of Systems: notable for Positive difficulty falling asleep; Occasional awakenings at night; Negative  early morning headaches; Negative  memory problems; Negative  concentration issues; Negative  chest pain; Negative  shortness of breath;  Negative rashes or itching; Positive heartburn / belching / flatulence; Negative  significant mood issues; No afternoon naps per week.     Vitals reported by patient     Patient-Reported Vitals 3/5/2021 Patient-Reported Weight 261 lb   Patient-Reported Pulse -   Patient-Reported Systolic  -   Patient-Reported Diastolic -      Calculated BMI 46.23 kg/m2    Physical Exam completed by visual and auditory observation of patient with verbal input from patient. General:   Alert, oriented, not in acute distress   Eyes:  Anicteric Sclerae; no obvious strabismus   Nose:  No obvious nasal septum deviation    Neck:   Midline trachea, no visible mass   Chest/Lungs:  Respiratory effort normal, no visualized signs of difficulty breathing or respiratory distress   CVS:  No JVD   Extremities:  No obvious rashes noted on face, neck, or hands   Neuro:  No facial asymmetry, no focal deficits; no obvious tremor    Psych:  Normal affect,  normal countenance     Ginnie Ormond is being evaluated by a Virtual Visit (video visit) encounter to address concerns as mentioned above. A caregiver was present when appropriate. Due to this being a TeleHealth encounter ([facility] emergency), evaluation of the following organ systems was limited: Vitals/Constitutional/EENT/Resp/CV/GI//MS/Neuro/Skin/Heme-Lymph-Imm. Pursuant to the emergency declaration under the [address] [place] [address], [address] [place] [address] authority and the [employer] Act, this Virtual Visit was conducted with patient's (and/or legal guardian's) consent, to reduce the patient's risk of exposure to COVID-19 and provide necessary medical care. Patient identification was verified at the start of the visit: YES using name and date of birth. Patient's phone number [phone] (home)  was confirmed for accuracy. She gives permission for messages regarding results and appointments to be left at that number. Services were provided through a video synchronous discussion virtually to substitute for in-person clinic visit.   I was at home while conducting this encounter, patient located at their home or alternate location of their choice. An electronic signature was used to authenticate this note. Minerva Cisneros MD, FAASM  Electronically signed.  03/05/21

## 2021-03-05 NOTE — PATIENT INSTRUCTIONS
[address] [facility] [place]. [address] [facility], [address] Tel.  [phone] Fax. 100 [facility] [address] [place], [address] [facility] Tel.  [phone] Fax. [phone] [address] Tel.  [phone] Fax. [phone] Learning About CPAP for Sleep Apnea What is CPAP? CPAP is a small machine that you use at home every night while you sleep. It increases air pressure in your throat to keep your airway open. When you have sleep apnea, this can help you sleep better so you feel much better. CPAP stands for \"continuous positive airway pressure. \" The CPAP machine will have one of the following: · A mask that covers your nose and mouth · Prongs that fit into your nose · A mask that covers your nose only, the most common type. This type is called NCPAP. The N stands for \"nasal.\" Why is it done? CPAP is usually the best treatment for obstructive sleep apnea. It is the first treatment choice and the most widely used. Your doctor may suggest CPAP if you have: · Moderate to severe sleep apnea. · Sleep apnea and coronary artery disease (CAD) or heart failure. How does it help? · CPAP can help you have more normal sleep, so you feel less sleepy and more alert during the daytime. · CPAP may help keep heart failure or other heart problems from getting worse. · NCPAP may help lower your blood pressure. · If you use CPAP, your bed partner may also sleep better because you are not snoring or restless. What are the side effects? Some people who use CPAP have: · A dry or stuffy nose and a sore throat. · Irritated skin on the face. · Sore eyes. · Bloating. If you have any of these problems, work with your doctor to fix them. Here are some things you can try: · Be sure the mask or nasal prongs fit well. · See if your doctor can adjust the pressure of your CPAP. · If your nose is dry, try a humidifier. · If your nose is runny or stuffy, try decongestant medicine or a steroid nasal spray. If these things do not help, you might try a different type of machine. Some machines have air pressure that adjusts on its own. Others have air pressures that are different when you breathe in than when you breathe out. This may reduce discomfort caused by too much pressure in your nose. Where can you learn more? Go to Mobio.be Enter H686 in the search box to learn more about \"Learning About CPAP for Sleep Apnea. \"  
© 3344-8102 Healthwise, Incorporated. Care instructions adapted under license by [facility] (which disclaims liability or warranty for this information). This care instruction is for use with your licensed healthcare professional. If you have questions about a medical condition or this instruction, always ask your healthcare professional. [address] any warranty or liability for your use of this information. Content Version: 5.3.89497; Last Revised: January 11, 2010 PROPER SLEEP HYGIENE What to avoid · Do not have drinks with caffeine, such as coffee or black tea, for 8 hours before bed. · Do not smoke or use other types of tobacco near bedtime. Nicotine is a stimulant and can keep you awake. · Avoid drinking alcohol late in the evening, because it can cause you to wake in the middle of the night. · Do not eat a big meal close to bedtime. If you are hungry, eat a light snack. · Do not drink a lot of water close to bedtime, because the need to urinate may wake you up during the night. · Do not read or watch TV in bed. Use the bed only for sleeping and sexual activity. What to try · Go to bed at the same time every night, and wake up at the same time every morning. Do not take naps during the day. · Keep your bedroom quiet, dark, and cool. · Get regular exercise, but not within 3 to 4 hours of your bedtime. [place] · Sleep on a comfortable pillow and mattress. · If watching the clock makes you anxious, turn it facing away from you so you cannot see the time. · If you worry when you lie down, start a worry book. Well before bedtime, write down your worries, and then set the book and your concerns aside. · Try meditation or other relaxation techniques before you go to bed. · If you cannot fall asleep, get up and go to another room until you feel sleepy. Do something relaxing. Repeat your bedtime routine before you go to bed again. · Make your house quiet and calm about an hour before bedtime. Turn down the lights, turn off the TV, log off the computer, and turn down the volume on music. This can help you relax after a busy day. Drowsy Driving: The [place] [address] cites drowsiness as a causing factor in more than 468,624 police reported crashes annually, resulting in 76,000 injuries and 1,500 deaths. Other surveys suggest 55% of people polled have driven while drowsy in the past year, 23% had fallen asleep but not crashed, 3% crashed, and 2% had and accident due to drowsy driving. Who is at risk? Young Drivers: One study of drowsy driving accidents states that 55% of the drivers were under 25 years. Of those, 75% were male. Shift Workers and Travelers: People who work overnight or travel across time zones frequently are at higher risk of experiencing Circadian Rhythm Disorders. They are trying to work and function when their body is programed to sleep. Sleep Deprived: Lack of sleep has a serious impact on your ability to pay attention or focus on a task. Consistently getting less than the average of 8 hours your body needs creates partial or cumulative sleep deprivation. Untreated Sleep Disorders: Sleep Apnea, Narcolepsy, R.L.S., and other sleep disorders (untreated) prevent a person from getting enough restful sleep. This leads to excessive daytime sleepiness and increases the risk for drowsy driving accidents by up to 7 times. Medications / Alcohol: Even over the counter medications can cause drowsiness. Medications that impair a drivers attention should have a warning label. Alcohol naturally makes you sleepy and on its own can cause accidents. Combined with excessive drowsiness its effects are amplified. Signs of Drowsy Driving: * You don't remember driving the last few miles * You may drift out of your darcy * You are unable to focus and your thoughts wander * You may yawn more often than normal 
 * You have difficulty keeping your eyes open / nodding off * Missing traffic signs, speeding, or tailgating Prevention-  
Good sleep hygiene, lifestyle and behavioral choices have the most impact on drowsy driving. There is no substitute for sleep and the average person requires 8 hours nightly. If you find yourself driving drowsy, stop and sleep. Consider the sleep hygiene tips provided during your visit as well. Medication Refill Policy: Refills for all medications require 1 week advance notice. Please have your pharmacy fax a refill request. We are unable to fax, or call in \"controled substance\" medications and you will need to pick these prescriptions up from our office. [employer] Activation Thank you for requesting access to [employer]. Please follow the instructions below to securely access and download your online medical record. [employer] allows you to send messages to your doctor, view your test results, renew your prescriptions, schedule appointments, and more. How Do I Sign Up? 1. In your internet browser, go to [email or web address]. 2. Click on the First Time User? Click Here link in the Sign In box. You will see the New Member Sign Up page. 3. Enter your [employer] Access Code exactly as it appears below. You will not need to use this code after youve completed the sign-up process. If you do not sign up before the expiration date, you must request a new code. MyChart Access Code: Activation code not generated Current [employer] Status: Active (This is the date your MyChart access code will ) 4. Enter the last four digits of your Social Security Number (xxxx) and Date of Birth (mm/dd/yyyy) as indicated and click Submit. You will be taken to the next sign-up page. 5. Create a [employer] ID. This will be your [employer] login ID and cannot be changed, so think of one that is secure and easy to remember. 6. Create a [employer] password. You can change your password at any time. 7. Enter your Password Reset Question and Answer. This can be used at a later time if you forget your password. 8. Enter your e-mail address. You will receive e-mail notification when new information is available in [address] 9. Click Sign Up. You can now view and download portions of your medical record. 10. Click the Download Summary menu link to download a portable copy of your medical information. Additional Information If you have questions, please call [phone]. Remember, [employer] is NOT to be used for urgent needs. For medical emergencies, dial 911.

## 2021-04-23 ENCOUNTER — VIRTUAL VISIT (OUTPATIENT)
Dept: RHEUMATOLOGY | Age: 41
End: 2021-04-23
Payer: MEDICARE

## 2021-04-23 DIAGNOSIS — M22.2X1 PATELLOFEMORAL ARTHRALGIA OF BOTH KNEES: ICD-10-CM

## 2021-04-23 DIAGNOSIS — E55.9 VITAMIN D DEFICIENCY: ICD-10-CM

## 2021-04-23 DIAGNOSIS — Z79.60 LONG-TERM USE OF IMMUNOSUPPRESSANT MEDICATION: ICD-10-CM

## 2021-04-23 DIAGNOSIS — M06.09 SERONEGATIVE RHEUMATOID ARTHRITIS OF MULTIPLE SITES (HCC): Primary | ICD-10-CM

## 2021-04-23 DIAGNOSIS — R20.2 PARESTHESIA AND PAIN OF BOTH UPPER EXTREMITIES: ICD-10-CM

## 2021-04-23 DIAGNOSIS — M79.602 PARESTHESIA AND PAIN OF BOTH UPPER EXTREMITIES: ICD-10-CM

## 2021-04-23 DIAGNOSIS — M22.2X2 PATELLOFEMORAL ARTHRALGIA OF BOTH KNEES: ICD-10-CM

## 2021-04-23 DIAGNOSIS — M79.601 PARESTHESIA AND PAIN OF BOTH UPPER EXTREMITIES: ICD-10-CM

## 2021-04-23 PROCEDURE — 99215 OFFICE O/P EST HI 40 MIN: CPT | Performed by: INTERNAL MEDICINE

## 2021-04-23 PROCEDURE — G8427 DOCREV CUR MEDS BY ELIG CLIN: HCPCS | Performed by: INTERNAL MEDICINE

## 2021-04-23 PROCEDURE — G8432 DEP SCR NOT DOC, RNG: HCPCS | Performed by: INTERNAL MEDICINE

## 2021-04-23 PROCEDURE — G8756 NO BP MEASURE DOC: HCPCS | Performed by: INTERNAL MEDICINE

## 2021-04-23 RX ORDER — METHOTREXATE 2.5 MG/1
TABLET ORAL
Qty: 96 TAB | Refills: 0 | Status: SHIPPED | OUTPATIENT
Start: 2021-04-23 | End: 2021-10-28 | Stop reason: SDUPTHER

## 2021-04-23 RX ORDER — ERGOCALCIFEROL 1.25 MG/1
50000 CAPSULE ORAL
Qty: 12 CAP | Refills: 4 | Status: SHIPPED | OUTPATIENT
Start: 2021-04-23

## 2021-04-23 RX ORDER — FOLIC ACID 1 MG/1
1 TABLET ORAL DAILY
Qty: 90 TAB | Refills: 5 | Status: SHIPPED | OUTPATIENT
Start: 2021-04-23 | End: 2022-07-13 | Stop reason: SDUPTHER

## 2021-04-23 NOTE — PROGRESS NOTES
REASON FOR VISIT    This is a follow-up visit for Ms. Kwesi Garcia for     ICD-10-CM   1. Seronegative rheumatoid arthritis of multiple sites [facility])  M06.09     The patient has consented for synchronous (real-time) Telemedicine (audio-video technology) on 4/23/2021 for their care to be delivered over telemedicine in place of their regularly scheduled office visit pursuant to the emergency declaration under the [facility], [address] [place] [address] and the [employer] Act, this Virtual  Visit was conducted, with patient's consent, to reduce the patient's risk of exposure to COVID-19 and provide continuity of care for an established patient. Services were provided through a video synchronous discussion virtually to substitute for in-person clinic visit. Inflammatory arthritis phenotype includes:  Anti-CCP positive: no  Rheumatoid factor positive: no  Erosive disease: yes  Extra-articular manifestations include: polyclonal gammopathy (elevated IgA, IgG), sensory neuropathy    Immunosuppression Screening (6/26/2020):  Quantiferon TB: negative  PPD:  Not performed  Hepatitis B: negative  Hepatitis C: negative    Therapy History includes:  Current DMARD therapy include: methotrexate 20 mg every Friday (7/03/2020 to present)  Prior DMARD therapy include: none  Discontinued DMARDs because of inefficacy: None  Discontinued DMARDs because of side effects: None    HISTORY OF PRESENT ILLNESS    Ms. Kwesi Garcia returns for a follow-up. On her last visit, I continued methotrexate 20 mg every Friday, which she has taken with good tolerance. I ordered lab which were not done, but I found labs done at her new PCP's on 4/15/2021 on [facility]. She saw neurology.  EMG/NCS on 12/09/2020: Extensive electrodiagnostic examination of the left upper and lower extremities reveals the following:  A generalized sensorimotor length-dependent polyneuropathy affecting the lower and upper extremities, severe in degree electrically with ongoing/active denervation. A left median neuropathy at or distal to the wrist, consistent with a clinical diagnosis of carpal tunnel syndrome, mild in degree electrically. A left ulnar sensory neuropathy, severe in degree electrically. No evidence of a left cervical or lumbosacral motor radiculopathy. Today, she feels a better. She continues burning and aching pain in her hands in the morning lasting 15 minutes. She feels there is welling in her left hand in the morning. She has morning stiffness lasting 15 minutes in the morning. She has aching and burning pain in her feet in the morning lasting around 20 minutes. She has a new PCP due to insurance change. She has been having pain in her left knee that is worse when she goes up stairs. She had worsening pain when she bends it. She has not received her COVID vaccine. She denies fever, weight loss, blurred vision, vision loss, oral ulcers, ankle swelling, dry cough, dyspnea, nausea, vomiting, dysphagia, abdominal pain, black or bloody stool, fall since last visit, rash, easy bruising and increased thirst.    Most recent toxicity monitoring by blood work was done on 4/15/2021 ([facility]) and did not reveal any significant adverse effects. Most recent inflammatory markers from 9/16/2020 revealed a ESR 60 mm/hr and CRP 11 mg/L. I reviewed the patient's interval medical history, surgical history, medications, and allergies. The patient has not had any interval hospital admissions, infections, or surgeries. REVIEW OF SYSTEMS    A comprehensive review of systems was performed and pertinent results are documented in the HPI, review of systems is otherwise non-contributory.     PAST MEDICAL HISTORY    She has a past medical history of Arthritis, Back pain, Diabetes ([address] Gout, Hypertension, Morbid obesity ([address] Neuropathy, Psychiatric disorder, and Unspecified sleep apnea. FAMILY HISTORY    Her family history includes Breast Cancer in her paternal grandmother; Breast Cancer (age of onset: 61) in her maternal grandmother; Cancer in her maternal grandmother and paternal grandmother; Crohn's Disease in her sister; Heart Disease in her father; Hypertension in her paternal grandmother; Lupus in her sister and sister; No Known Problems in her mother. SOCIAL HISTORY    She reports that she quit smoking about 4 years ago. She has never used smokeless tobacco. She reports current alcohol use. She reports that she does not use drugs. MEDICATIONS    Current Outpatient Medications   Medication Sig    methotrexate (RHEUMATREX) 2.5 mg tablet TAKE 8 TABLETS BY MOUTH EVERY FRIDAY    folic acid (FOLVITE) 1 mg tablet Take 1 Tab by mouth daily.  ergocalciferol (ERGOCALCIFEROL) 1,250 mcg (50,000 unit) capsule Take 1 Cap by mouth every seven (7) days. Indications: vitamin D deficiency (high dose therapy)    SITagliptin-metFORMIN (Janumet) 50-1,000 mg per tablet Take 1 Tab by mouth daily.  mometasone (NASONEX) 50 mcg/actuation nasal spray Nasonex 50 mcg/actuation Spray    LYRICA 75 mg capsule 150 mg daily.  COLCRYS 0.6 mg tablet as needed.  hydrochlorothiazide (HYDRODIURIL) 25 mg tablet Take 25 mg by mouth daily.  esomeprazole (NEXIUM) 40 mg capsule Take 40 mg by mouth daily.  buPROPion XL (WELLBUTRIN XL) 150 mg tablet Take 150 mg by mouth every morning. Indications: DEPRESSION ASSOCIATED WITH MANIC DEPRESSIVE DISORDER    metoprolol (LOPRESSOR) 50 mg tablet Take  by mouth two (2) times a day. No current facility-administered medications for this visit. ALLERGIES    Allergies   Allergen Reactions    Latex Rash       PHYSICAL EXAMINATION    There were no vitals taken for this visit. There is no height or weight on file to calculate BMI.     General: Patient is alert, oriented x 3, not in acute distress    HEENT:   Sclerae are not injected and appear moist.  There is no alopecia. Chest:  Breathing comfortably at room air. Musculoskeletal exam:  A comprehensive musculoskeletal exam was NOT performed for all joints of each upper and lower extremity and assessed for swelling, tenderness and range of motion.  Positive results are documented as below:    Previous Exam    Decreased passive extension of right wrist  Tenderness described as burning in interphalageal areas, PIPs and DIP  Bilateral MTP tenderness     Z-Deformities:   no  Proctor Neck Deformities:  no  Boutonierre's Deformities:  no  Ulnar Deviation:   no  MCP Subluxation:  no     Joint Count 6/26/2020   MHAQ 0.5   Left wrist- Tender 1   Left wrist- Swollen 1   Left 1st MCP - Tender 1   Left 1st MCP - Swollen 0   Left 2nd MCP - Tender 1   Left 2nd MCP - Swollen 0   Left 3rd MCP - Tender 1   Left 3rd MCP - Swollen 0   Left 4th MCP - Tender 1   Left 4th MCP - Swollen 0   Left 5th MCP - Tender 1   Left 5th MCP - Swollen 0   Left thumb IP - Tender 0   Left thumb IP - Swollen 1   Left 2nd PIP - Tender 1   Left 2nd PIP - Swollen 0   Left 3rd PIP - Tender 1   Left 3rd PIP - Swollen 0   Left 4th PIP - Tender 1   Left 4th PIP - Swollen 0   Left 5th PIP - Tender 1   Left 5th PIP - Swollen 0   Right wrist- Tender 1   Right wrist- Swollen 1   Right 1st MCP - Tender 1   Right 1st MCP - Swollen 0   Right 2nd MCP - Tender 1   Right 2nd MCP - Swollen 0   Right 3rd MCP - Tender 1   Right 3rd MCP - Swollen 0   Right 4th MCP - Tender 1   Right 4th MCP - Swollen 0   Right 5th MCP - Tender 1   Right 5th MCP - Swollen 0   Right thumb IP - Tender 1   Right thumb IP - Swollen 0   Right 2nd PIP - Tender 1   Right 2nd PIP - Swollen 0   Right 3rd PIP - Tender 1   Right 3rd PIP - Swollen 0   Right 4th PIP - Tender 1   Right 4th PIP - Swollen 0   Right 5th PIP - Tender 1   Right 5th PIP - Swollen 0   Tender Joint Count (Total) 21   Swollen Joint Count (Total) 3     DATA REVIEW    Laboratory     Recent laboratory results were reviewed, summarized, and discussed with the patient. Imaging    Musculoskeletal Ultrasound    None    Radiographs    Bilateral Hand 7/02/2020: RIGHT: no fracture or other acute osseous or articular abnormality. The soft tissues are within normal limits. Mineralization is normal. There are no erosions. There is no evidence for CPPD. LEFT: no fracture or other acute osseous or articular abnormality. The soft tissues are within normal limits. Mineralization is normal. There are no erosions. There is no evidence for CPPD    Bilateral Foot 7/02/2020: RIGHT: no fracture. Mineralization is normal. There is an erosive change at the first metatarsal tarsal junction. A large plantar spur is seen. RIGHT: no fracture. Mineralization is normal. There is a severe erosive change at the first metatarsal tarsal junction. A large plantar spur is seen. Bilateral Hand 12/31/2019: LEFT: These demonstrate mild arthritic change throughout the hand.  No significant acute findings. RIGHT: These demonstrate mild arthritic change throughout the hand.  No significant acute findings. She was prescribed a Medrol dose pack which did not help. CT Imaging    None    MR Imaging    None    DXA     None    EMG/NCS    EMG/NCS 12/09/2020: Extensive electrodiagnostic examination of the left upper and lower extremities reveals the following:  A generalized sensorimotor length-dependent polyneuropathy affecting the lower and upper extremities, severe in degree electrically with ongoing/active denervation. A left median neuropathy at or distal to the wrist, consistent with a clinical diagnosis of carpal tunnel syndrome, mild in degree electrically. A left ulnar sensory neuropathy, severe in degree electrically. No evidence of a left cervical or lumbosacral motor radiculopathy. ASSESSMENT AND PLAN    This is a follow-up visit for Ms. Barbara Delarosa. 1) Suspected Seronegative Erosive Rheumatoid Arthritis.  She had bilateral wrist swelling and decreased ROM of right wrist, suspicious of radiocarpal arthritis, which would be a secondary pathology to injury or inflammatory arthritis. She denies injury. She had neuropathy which improved with Lyrica and was given a Medrol dose pack without benefit. She may have small fiber neuropathy as well. Her sisters have lupus. She is maintained on methotrexate 20 mg every Friday with good tolerance since 7/03/2020. She continues to have neuropathic burning pain confirmed on EMG/NCS. I will continue methotrexate 20 mg every Friday. Refills sent. 2) Bilateral Hand and Foot Neuropathy. She reports burning pruritic pain in her hands and feet that is chronic. Lyrica helps. She has a history of carpal tunnel syndrome s/p release without relief but carpal tunnel syndrome would not involve the 5th digits. Vitamin B12 and folate were normal. I had referred her to neurology for further evaluation and testing. EMG/NCS showed neuropathy. She is following with Dr. Ursula Muniz. 3) Long Term Use of Immunosuppressants. The patient remains on high risk immunomodulatory medications (methotrexate) and requires frequent toxicity monitoring by blood work to evaluate for toxicities. Respective labs were ordered (see below orders for details). 4) Vitamin D Deficiency. The patient's vitamin D level was 32.7 (previously 24.9). She is weekly ergocalciferol 50,000. I will check her level. 5) History of Gout. This was diagnosed by her podiatrist based on burning and throbbing pain in her feet. She feels at times, pain in her big toe and when she takes colchicine, it helps. Her history is not consistent with gout. Her uric acid was 6.9 mg/dL (previously 7.4 mg/dL). 6) Left Knee pain. This seems to be patellofemoral arthralgia. I referred her to physical therapy.      The patient has consented for synchronous (real-time) Telemedicine (audio-video technology) on 4/23/2021 for their care to be delivered over telemedicine in place of their regularly scheduled office visit pursuant to the emergency declaration under the [facility] waiver authority and the [employer] Act, this Virtual  Visit was conducted, with patient's consent, to reduce the patient's risk of exposure to COVID-19 and provide continuity of care for an established patient. A total of 42 minutes was spent on this visit, reviewing interval notes, interval testing results, ordering tests, refilling medications, documenting the findings in the note, patient education, counseling, and coordination of care as described above. All questions asked and answered. Services were provided through a video synchronous discussion virtually to substitute for in-person clinic visit.     TODAY'S ORDERS    Orders Placed This Encounter    CBC WITH AUTOMATED DIFF    METABOLIC PANEL, COMPREHENSIVE    C REACTIVE PROTEIN, QT    SED RATE (ESR)    METHOTREXATE POLYGLUTAMATES    VITAMIN D, 25 HYDROXY    URIC ACID    REFERRAL TO PHYSICAL THERAPY    methotrexate (RHEUMATREX) 2.5 mg tablet    folic acid (FOLVITE) 1 mg tablet    ergocalciferol (ERGOCALCIFEROL) 1,250 mcg (50,000 unit) capsule     Future Appointments   Date Time Provider Florencio Madrid   3/10/2022 11:40 AM Hemalatha Chan MD JAMIR [facility] [place] [facility] MAMIE Lopez MD, [address] [place] [address] Rd    Adult Rheumatology   Rheumatology Ultrasound Certified  [facility]  [facility], [address] [place] [address] Road   Phone [phone]  Fax [phone]

## 2021-05-26 ENCOUNTER — OFFICE VISIT (OUTPATIENT)
Dept: NEUROLOGY | Age: 41
End: 2021-05-26
Payer: MEDICARE

## 2021-05-26 VITALS
RESPIRATION RATE: 18 BRPM | HEART RATE: 65 BPM | SYSTOLIC BLOOD PRESSURE: 142 MMHG | OXYGEN SATURATION: 98 % | DIASTOLIC BLOOD PRESSURE: 80 MMHG

## 2021-05-26 DIAGNOSIS — E11.42 TYPE 2 DIABETES MELLITUS WITH DIABETIC POLYNEUROPATHY, UNSPECIFIED WHETHER LONG TERM INSULIN USE (HCC): ICD-10-CM

## 2021-05-26 DIAGNOSIS — G56.02 CARPAL TUNNEL SYNDROME OF LEFT WRIST: ICD-10-CM

## 2021-05-26 DIAGNOSIS — G62.9 POLYNEUROPATHY: Primary | ICD-10-CM

## 2021-05-26 PROCEDURE — G8432 DEP SCR NOT DOC, RNG: HCPCS | Performed by: PSYCHIATRY & NEUROLOGY

## 2021-05-26 PROCEDURE — 3046F HEMOGLOBIN A1C LEVEL >9.0%: CPT | Performed by: PSYCHIATRY & NEUROLOGY

## 2021-05-26 PROCEDURE — G8753 SYS BP > OR = 140: HCPCS | Performed by: PSYCHIATRY & NEUROLOGY

## 2021-05-26 PROCEDURE — G8427 DOCREV CUR MEDS BY ELIG CLIN: HCPCS | Performed by: PSYCHIATRY & NEUROLOGY

## 2021-05-26 PROCEDURE — 99214 OFFICE O/P EST MOD 30 MIN: CPT | Performed by: PSYCHIATRY & NEUROLOGY

## 2021-05-26 PROCEDURE — G8754 DIAS BP LESS 90: HCPCS | Performed by: PSYCHIATRY & NEUROLOGY

## 2021-05-26 PROCEDURE — G8417 CALC BMI ABV UP PARAM F/U: HCPCS | Performed by: PSYCHIATRY & NEUROLOGY

## 2021-05-26 PROCEDURE — 2022F DILAT RTA XM EVC RTNOPTHY: CPT | Performed by: PSYCHIATRY & NEUROLOGY

## 2021-05-26 RX ORDER — CHOLECALCIFEROL (VITAMIN D3) 50 MCG
CAPSULE ORAL
COMMUNITY

## 2021-05-26 NOTE — PROGRESS NOTES
Neurology Clinic Follow up Note    Patient ID:  Charles Aguilar  [id number]  61 y.o.  1980      Ms. Ara Alvarez is here for follow up today of  Chief Complaint   Patient presents with    Numbness          Last Appointment With Me:  12/9/20    \"Winterian Alvarez is presenting for evaluation of distal extremity paresthesias. Patient reports onset of lower extremity paresthesias approximately 7 years ago describe as a burning/tingling or numbness involving both feet symmetrically not extending above the ankles. Symptoms are rather constant. Symptoms are worse when resting or in the evenings. She has noted some slight weakness into the LLE when climbing stairs with associated L knee pain. She endorses some imbalance at times. She has chronic LBP as well without significant radicular symptoms. She also reports h/o hand paresthesias s/p CTS release approximately 9 years ago initially improved with slight worsening burning/swelling, tingling into all fingers and decreased hand  b/l over the past year L>R. \"  Interval History:   Patient returns for f/u of distal extremity paresthesias. She reports Lyrica is moderately helpful for her pain. Paresthesias are slightly worse into the feet. Hand symptoms are stable. DM being managed by PCP. She is tolerating medication without excessive fatigue-taking Lyrica 150mg qhs as symptoms appear worse in the evenings. She endorses occasional muscle cramping into both legs and imbalance when ambulating. Also reporting ongoing L knee pain/arthralgias. Referred to PT for knee pain recently by Rheumatology. PMHx/ PSHx/ FHx/ SHx:  Reviewed and unchanged previous visit.    Past Medical History:   Diagnosis Date    Arthritis      in hands    Back pain     Diabetes ([facility]     prediabetic    Gout     Hypertension     Morbid obesity ([facility]     Neuropathy     Psychiatric disorder     depression    Unspecified sleep apnea          ROS:  Comprehensive review of systems negative except for as noted above. Objective:       Meds:  Current Outpatient Medications   Medication Sig Dispense Refill    omega 3-dha-epa-fish oil (Fish Oil) 100-160-1,000 mg cap Take  by mouth.  methotrexate (RHEUMATREX) 2.5 mg tablet TAKE 8 TABLETS BY MOUTH EVERY FRIDAY 96 Tab 0    folic acid (FOLVITE) 1 mg tablet Take 1 Tab by mouth daily. 90 Tab 5    ergocalciferol (ERGOCALCIFEROL) 1,250 mcg (50,000 unit) capsule Take 1 Cap by mouth every seven (7) days. Indications: vitamin D deficiency (high dose therapy) 12 Cap 4    SITagliptin-metFORMIN (Janumet) 50-1,000 mg per tablet Take 1 Tab by mouth daily.  mometasone (NASONEX) 50 mcg/actuation nasal spray Nasonex 50 mcg/actuation Spray      LYRICA 75 mg capsule 150 mg daily.  COLCRYS 0.6 mg tablet as needed.  hydrochlorothiazide (HYDRODIURIL) 25 mg tablet Take 25 mg by mouth daily.  esomeprazole (NEXIUM) 40 mg capsule Take 40 mg by mouth daily.  buPROPion XL (WELLBUTRIN XL) 150 mg tablet Take 150 mg by mouth every morning. Indications: DEPRESSION ASSOCIATED WITH MANIC DEPRESSIVE DISORDER      metoprolol (LOPRESSOR) 50 mg tablet Take  by mouth two (2) times a day. Exam:  Visit Vitals  BP (!) 142/80   Pulse 65   Resp 18   LMP 04/17/2013   SpO2 98%     NEUROLOGICAL EXAM:  General: Awake, alert, speech fluent  CN: PERRL, EOMI without nystagmus, VFF to confrontation, facial sensation and strength are normal and symmetric, hearing is intact to finger rub bilaterally, palate and tongue movements are intact and symmetric. Motor: Normal tone, bulk and strength bilaterally. Reflexes: trace, symmetric, plantar stimulation is flexor. Coordination: FNF, ALMA, HTS intact. Sensation: LT intact throughout. Gait: Normal-based and steady.     LABS  Results for orders placed or performed in visit on 12/03/20   CULTURE, URINE    Specimen: Clean catch; Urine   Result Value Ref Range    Special Requests: NO SPECIAL REQUESTS Culture result: No significant growth, <10,000 CFU/mL     NUSWAB VAGINOSIS + CANDIDA   Result Value Ref Range    Atopobium vaginae Low - 0 Score    BVAB 2 Low - 0 Score    Megasphaera 1 Low - 0 Score    C. albicans, ERIC Negative Negative    C. glabrata, ERIC Positive (A) Negative   HEP B SURFACE AG   Result Value Ref Range    Hepatitis B surface Ag <0.10 Index    Hep B surface Ag Interp. Negative Negative     HCV AB W/REFLEX VERIFICATION   Result Value Ref Range    HCV Ab 0.1 0.0 - 0.9 s/co ratio   T PALLIDUM SCREEN W/REFLEX   Result Value Ref Range    T PALLIDUM AB Non Reactive Non Reactive   HIV 1/2 AG/AB, 4TH GENERATION,W RFLX CONFIRM   Result Value Ref Range    HIV 1/2 Interpretation NONREACTIVE NONREACTIVE      HIV 1/2 result comment SEE NOTE     HCV COMMENT   Result Value Ref Range    Comment Comment     PAP IG, CT-NG TV HPV 16&18,45 ([id number], [id number])   Result Value Ref Range    Diagnosis Comment     Specimen adequacy Comment     Clinician provided ICD10 Comment     Performed by: Comment     . Ronna Gann Note: Comment     Test methodology Comment     HPV DNA Probe, High Risk Negative Negative    Chlamydia, Nuc. Acid Amp Negative Negative    Gonococcus, Nuc. Acid Amp Negative Negative    Trich vag by ERIC Negative Negative       IMAGING:  MRI Results (most recent):  No results found for this or any previous visit. EMG:  Extensive electrodiagnostic examination of the left upper and lower extremities reveals the followin. A generalized sensorimotor length-dependent polyneuropathy affecting the lower and upper extremities, severe in degree electrically with ongoing/active denervation.       2. A left median neuropathy at or distal to the wrist, consistent with a clinical diagnosis of carpal tunnel syndrome, mild in degree electrically.      3. A left ulnar sensory neuropathy, severe in degree electrically and likely related to #1 above.       4. No evidence of a left cervical or lumbosacral motor radiculopathy.    Assessment:     Encounter Diagnoses     ICD-10-CM ICD-9-CM   1. Polyneuropathy  G62.9 356.9   2. Carpal tunnel syndrome of left wrist  G56.02 354.0   3. Type 2 diabetes mellitus with diabetic polyneuropathy, unspecified whether long term insulin use (HCC)  E11.42 250.60     28.2   35year old pleasant AAF with a h/o seronegative RA, DM, b/l CTS s/p release referred for evaluation of progressive distal lower and upper extremity paresthesias for several years. EMG completed as outlined above with findings of a severe generalized sensorimotor polyneuropathy, mild L CTS and L ulnar sensory neuropathy. I suspect her polyneuropathy is secondary to diabetes. Worsening upper extremity paresthesias s/p CTS release is most likely explained by superimposed polyneuropathy now extending to her hands b/l. We discussed titration of Lyrica in the evenings to assist with neuropathic pain symptoms. This is currently being managed by her PCP. Reviewed importance of strict glucose control to prevent further progression of her polyneuropathy. Plan:   Continue Lyrica for neuropathic pain symptoms-defer titration to her PCP  Discussed strict control of glucose to prevent progression of polyneuropathy    I have discussed the diagnosis with the patient today and the intended plan as seen in the above orders with both the patient as well as referring provider and/or PCP via electronic correspondence. The patient has received an after-visit summary and questions were answered concerning future plans. I have discussed medication side effects and warnings with the patient as well.       Signed:  Rocky Echeverria DO  5/26/2021

## 2021-05-26 NOTE — PATIENT INSTRUCTIONS
[facility]   Statement to Patients  April 1, 2014      In an effort to ensure the large volume of patient prescription refills is processed in the most efficient and expeditious manner, we are asking our patients to assist us by calling your Pharmacy for all prescription refills, this will include also your  Mail Order Pharmacy. The pharmacy will contact our office electronically to continue the refill process. Please do not wait until the last minute to call your pharmacy. We need at least 48 hours (2days) to fill prescriptions. We also encourage you to call your pharmacy before going to  your prescription to make sure it is ready. With regard to controlled substance prescription refill requests (narcotic refills) that need to be picked up at our office, we ask your cooperation by providing us with at least 72 hours (3days) notice that you will need a refill. We will not refill narcotic prescription refill requests after 4:00pm on any weekday, Monday through Thursday, or after 2:00pm on Fridays, or on the weekends. We encourage everyone to explore another way of getting your prescription refill request processed using [employer], our patient web portal through our electronic medical record system. [employer] is an efficient and effective way to communicate your medication request directly to the office and  downloadable as an suly on your smart phone . [employer] also features a review functionality that allows you to view your medication list as well as leave messages for your physician. Are you ready to get connected? If so please review the attatched instructions or speak to any of our staff to get you set up right away! Thank you so much for your cooperation. Should you have any questions please contact our Practice Administrator.     The Physicians and Staff,  [facility]

## 2021-06-15 ENCOUNTER — HOSPITAL ENCOUNTER (OUTPATIENT)
Dept: GENERAL RADIOLOGY | Age: 41
Discharge: HOME | End: 2021-06-15
Payer: MEDICAID

## 2021-06-15 ENCOUNTER — TRANSCRIBE ORDER (OUTPATIENT)
Dept: REGISTRATION | Age: 41
End: 2021-06-15

## 2021-06-15 DIAGNOSIS — M25.562 LEFT KNEE PAIN: Primary | ICD-10-CM

## 2021-06-15 DIAGNOSIS — M25.562 LEFT KNEE PAIN: ICD-10-CM

## 2021-06-15 PROCEDURE — 73562 X-RAY EXAM OF KNEE 3: CPT

## 2021-07-15 ENCOUNTER — TRANSCRIBE ORDER (OUTPATIENT)
Dept: SCHEDULING | Age: 41
End: 2021-07-15

## 2021-07-15 DIAGNOSIS — Z12.31 SCREENING MAMMOGRAM FOR HIGH-RISK PATIENT: Primary | ICD-10-CM

## 2021-07-20 ENCOUNTER — HOSPITAL ENCOUNTER (OUTPATIENT)
Dept: MAMMOGRAPHY | Age: 41
Discharge: HOME | End: 2021-07-20
Attending: NURSE PRACTITIONER
Payer: MEDICARE

## 2021-07-20 DIAGNOSIS — Z12.31 SCREENING MAMMOGRAM FOR HIGH-RISK PATIENT: ICD-10-CM

## 2021-07-20 PROCEDURE — 77067 SCR MAMMO BI INCL CAD: CPT

## 2021-08-12 ENCOUNTER — TELEPHONE (OUTPATIENT)
Dept: RHEUMATOLOGY | Age: 41
End: 2021-08-12

## 2021-08-24 ENCOUNTER — VIRTUAL VISIT (OUTPATIENT)
Dept: RHEUMATOLOGY | Age: 41
End: 2021-08-24
Payer: MEDICARE

## 2021-08-24 DIAGNOSIS — M79.601 PARESTHESIA AND PAIN OF BOTH UPPER EXTREMITIES: ICD-10-CM

## 2021-08-24 DIAGNOSIS — R20.2 PARESTHESIA AND PAIN OF BOTH UPPER EXTREMITIES: ICD-10-CM

## 2021-08-24 DIAGNOSIS — Z79.60 LONG-TERM USE OF IMMUNOSUPPRESSANT MEDICATION: ICD-10-CM

## 2021-08-24 DIAGNOSIS — M06.09 SERONEGATIVE RHEUMATOID ARTHRITIS OF MULTIPLE SITES (HCC): Primary | ICD-10-CM

## 2021-08-24 DIAGNOSIS — M79.602 PARESTHESIA AND PAIN OF BOTH UPPER EXTREMITIES: ICD-10-CM

## 2021-08-24 PROCEDURE — G8756 NO BP MEASURE DOC: HCPCS | Performed by: INTERNAL MEDICINE

## 2021-08-24 PROCEDURE — G8432 DEP SCR NOT DOC, RNG: HCPCS | Performed by: INTERNAL MEDICINE

## 2021-08-24 PROCEDURE — G8427 DOCREV CUR MEDS BY ELIG CLIN: HCPCS | Performed by: INTERNAL MEDICINE

## 2021-08-24 PROCEDURE — 99215 OFFICE O/P EST HI 40 MIN: CPT | Performed by: INTERNAL MEDICINE

## 2021-08-24 NOTE — PROGRESS NOTES
REASON FOR VISIT    This is a follow-up visit for Ms. Lane Acuna for     ICD-10-CM   1. Seronegative rheumatoid arthritis of multiple sites [facility])  M06.09     The patient has consented for synchronous (real-time) Telemedicine (audio-video technology) on 8/24/2021 for their care to be delivered over telemedicine in place of their regularly scheduled office visit pursuant to the emergency declaration under the [address] and the [address] authority and the [employer] Act, this Virtual  Visit was conducted, with patient's consent, to reduce the patient's risk of exposure to COVID-19 and provide continuity of care for an established patient. Services were provided through a video synchronous discussion virtually to substitute for in-person clinic visit. Inflammatory arthritis phenotype includes:  Anti-CCP positive: no  Rheumatoid factor positive: no  Erosive disease: yes  Extra-articular manifestations include: polyclonal gammopathy (elevated IgA, IgG), sensory neuropathy    Immunosuppression Screening (6/26/2020):  Quantiferon TB: negative  PPD:  Not performed  Hepatitis B: negative  Hepatitis C: negative    Therapy History includes:  Current DMARD therapy include: methotrexate 20 mg every Friday (7/03/2020 to present)  Prior DMARD therapy include: none  Discontinued DMARDs because of inefficacy: None  Discontinued DMARDs because of side effects: None    HISTORY OF PRESENT ILLNESS    Ms. Lane Acuna returns for a follow-up. On her last visit, I continued methotrexate 20 mg every Friday, which she has taken with good tolerance. I ordered lab which were not done. She had seen neurology for neuropathy who asked her to continue Lyrica 150 mg and glycemic control. Today, she feels okay. She injured her right knee going down the steps. She has stiffness lasting several minutes and feels she has swelling in her hands.  She has cramping in her legs in the morning. She has not received her COVID vaccine. She denies fever, weight loss, blurred vision, vision loss, oral ulcers, ankle swelling, dry cough, dyspnea, nausea, vomiting, dysphagia, abdominal pain, black or bloody stool, fall since last visit, rash, easy bruising and increased thirst.    Most recent toxicity monitoring by blood work was done on 4/15/2021 ([facility]) and did not reveal any significant adverse effects. Most recent inflammatory markers from 9/16/2020 revealed a ESR 60 mm/hr and CRP 11 mg/L. I reviewed the patient's interval medical history, surgical history, medications, and allergies. The patient has not had any interval hospital admissions, infections, or surgeries. REVIEW OF SYSTEMS    A comprehensive review of systems was performed and pertinent results are documented in the HPI, review of systems is otherwise non-contributory. PAST MEDICAL HISTORY    She has a past medical history of Arthritis, Back pain, Diabetes ([address] Gout, Hypertension, Morbid obesity ([facility] [address] Neuropathy, Psychiatric disorder, and Unspecified sleep apnea. FAMILY HISTORY    Her family history includes Breast Cancer in her paternal grandmother; Breast Cancer (age of onset: 61) in her maternal grandmother; Cancer in her maternal grandmother and paternal grandmother; Crohn's Disease in her sister; Heart Disease in her father; Hypertension in her paternal grandmother; Lupus in her sister and sister; No Known Problems in her mother. SOCIAL HISTORY    She reports that she quit smoking about 5 years ago. She has never used smokeless tobacco. She reports current alcohol use. She reports that she does not use drugs. MEDICATIONS    Current Outpatient Medications   Medication Sig    omega 3-dha-epa-fish oil (Fish Oil) 100-160-1,000 mg cap Take  by mouth.     methotrexate (RHEUMATREX) 2.5 mg tablet TAKE 8 TABLETS BY MOUTH EVERY FRIDAY    folic acid (FOLVITE) 1 mg tablet Take 1 Tab by mouth daily.  ergocalciferol (ERGOCALCIFEROL) 1,250 mcg (50,000 unit) capsule Take 1 Cap by mouth every seven (7) days. Indications: vitamin D deficiency (high dose therapy)    SITagliptin-metFORMIN (Janumet) 50-1,000 mg per tablet Take 1 Tab by mouth daily.  mometasone (NASONEX) 50 mcg/actuation nasal spray Nasonex 50 mcg/actuation Spray    Lyrica 150 mg capsule 150 mg two (2) times a day.  COLCRYS 0.6 mg tablet as needed.  hydrochlorothiazide (HYDRODIURIL) 25 mg tablet Take 25 mg by mouth daily.  esomeprazole (NEXIUM) 40 mg capsule Take 40 mg by mouth daily.  buPROPion XL (WELLBUTRIN XL) 150 mg tablet Take 150 mg by mouth every morning. Indications: DEPRESSION ASSOCIATED WITH MANIC DEPRESSIVE DISORDER    metoprolol (LOPRESSOR) 50 mg tablet Take  by mouth two (2) times a day. No current facility-administered medications for this visit. ALLERGIES    Allergies   Allergen Reactions    Latex Rash       PHYSICAL EXAMINATION    There were no vitals taken for this visit. There is no height or weight on file to calculate BMI. General: Patient is alert, oriented x 3, not in acute distress    HEENT:   Sclerae are not injected and appear moist.  There is no alopecia. Chest:  Breathing comfortably at room air. Musculoskeletal exam:  A comprehensive musculoskeletal exam was NOT performed for all joints of each upper and lower extremity and assessed for swelling, tenderness and range of motion.  Positive results are documented as below:    Previous Exam    Decreased passive extension of right wrist  Tenderness described as burning in interphalageal areas, PIPs and DIP  Bilateral MTP tenderness     Z-Deformities:   no  Aspen Neck Deformities:  no  Boutonierre's Deformities:  no  Ulnar Deviation:   no  MCP Subluxation:  no     Joint Count 6/26/2020   MHAQ 0.5   Left wrist- Tender 1   Left wrist- Swollen 1   Left 1st MCP - Tender 1   Left 1st MCP - Swollen 0   Left 2nd MCP - Tender 1   Left 2nd MCP - Swollen 0   Left 3rd MCP - Tender 1   Left 3rd MCP - Swollen 0   Left 4th MCP - Tender 1   Left 4th MCP - Swollen 0   Left 5th MCP - Tender 1   Left 5th MCP - Swollen 0   Left thumb IP - Tender 0   Left thumb IP - Swollen 1   Left 2nd PIP - Tender 1   Left 2nd PIP - Swollen 0   Left 3rd PIP - Tender 1   Left 3rd PIP - Swollen 0   Left 4th PIP - Tender 1   Left 4th PIP - Swollen 0   Left 5th PIP - Tender 1   Left 5th PIP - Swollen 0   Right wrist- Tender 1   Right wrist- Swollen 1   Right 1st MCP - Tender 1   Right 1st MCP - Swollen 0   Right 2nd MCP - Tender 1   Right 2nd MCP - Swollen 0   Right 3rd MCP - Tender 1   Right 3rd MCP - Swollen 0   Right 4th MCP - Tender 1   Right 4th MCP - Swollen 0   Right 5th MCP - Tender 1   Right 5th MCP - Swollen 0   Right thumb IP - Tender 1   Right thumb IP - Swollen 0   Right 2nd PIP - Tender 1   Right 2nd PIP - Swollen 0   Right 3rd PIP - Tender 1   Right 3rd PIP - Swollen 0   Right 4th PIP - Tender 1   Right 4th PIP - Swollen 0   Right 5th PIP - Tender 1   Right 5th PIP - Swollen 0   Tender Joint Count (Total) 21   Swollen Joint Count (Total) 3     DATA REVIEW    Laboratory     Recent laboratory results were reviewed, summarized, and discussed with the patient. Imaging    Musculoskeletal Ultrasound    None    Radiographs    Left Knee 6/15/2021: mild tricompartmental joint space narrowing and small marginal osteophyte formations. No acute fracture or dislocation. There is no significant soft tissue swelling. There is no joint effusion. Bilateral Hand 7/02/2020: RIGHT: no fracture or other acute osseous or articular abnormality. The soft tissues are within normal limits. Mineralization is normal. There are no erosions. There is no evidence for CPPD. LEFT: no fracture or other acute osseous or articular abnormality. The soft tissues are within normal limits. Mineralization is normal. There are no erosions. There is no evidence for CPPD.      Bilateral Foot 7/02/2020: RIGHT: no fracture. Mineralization is normal. There is an erosive change at the first metatarsal tarsal junction. A large plantar spur is seen. RIGHT: no fracture. Mineralization is normal. There is a severe erosive change at the first metatarsal tarsal junction. A large plantar spur is seen. Bilateral Hand 12/31/2019: LEFT: These demonstrate mild arthritic change throughout the hand.  No significant acute findings. RIGHT: These demonstrate mild arthritic change throughout the hand.  No significant acute findings. She was prescribed a Medrol dose pack which did not help. CT Imaging    None    MR Imaging    None    DXA     None    EMG/NCS    EMG/NCS 12/09/2020: Extensive electrodiagnostic examination of the left upper and lower extremities reveals the following:  A generalized sensorimotor length-dependent polyneuropathy affecting the lower and upper extremities, severe in degree electrically with ongoing/active denervation. A left median neuropathy at or distal to the wrist, consistent with a clinical diagnosis of carpal tunnel syndrome, mild in degree electrically. A left ulnar sensory neuropathy, severe in degree electrically. No evidence of a left cervical or lumbosacral motor radiculopathy. ASSESSMENT AND PLAN    This is a follow-up visit for Ms. Anatoliy Acosta. 1) Suspected Seronegative Erosive Rheumatoid Arthritis. She had bilateral wrist swelling and decreased ROM of right wrist, suspicious of radiocarpal arthritis, which would be a secondary pathology to injury or inflammatory arthritis. She denies injury. She had neuropathy which improved with Lyrica and was given a Medrol dose pack without benefit. She may have small fiber neuropathy as well. Her sisters have lupus. She is maintained on methotrexate 20 mg every Friday with good tolerance since 7/03/2020. She continues to have neuropathic burning pain confirmed on EMG/NCS.  I am curious to determine if her pain is actually neuropathy without underlying Rheumatoid Arthritis. So I asked her to hold methotrexate and let me know if she worsens. If she does not worsen, then she does not have Rheumatoid Arthritis and her symptoms are neuropathic, but if she does feel worse, she may have an overlap. She understood and will let me know. 2) Bilateral Hand and Foot Neuropathy. She reports burning pruritic pain in her hands and feet that is chronic. Lyrica helps. She has a history of carpal tunnel syndrome s/p release without relief but carpal tunnel syndrome would not involve the 5th digits. Vitamin B12 and folate were normal. I had referred her to neurology for further evaluation and testing. EMG/NCS showed neuropathy. She is following with Dr. Chidi Pena. 3) Long Term Use of Immunosuppressants. The patient remains on high risk immunomodulatory medications (methotrexate) and requires frequent toxicity monitoring by blood work to evaluate for toxicities. Respective labs were ordered (see below orders for details). 4) Vitamin D Deficiency. The patient's vitamin D level was 32.7 (previously 24.9). She is weekly ergocalciferol 50,000. I will check her level. 5) History of Gout. This was diagnosed by her podiatrist based on burning and throbbing pain in her feet. She feels at times, pain in her big toe and when she takes colchicine, it helps. Her history is not consistent with gout. Her uric acid was 6.9 mg/dL (previously 7.4 mg/dL). 6) Left Knee pain. This seems to be patellofemoral arthralgia. I had referred her to physical therapy but she did not go.      The patient has consented for synchronous (real-time) Telemedicine (audio-video technology) on 8/24/2021 for their care to be delivered over telemedicine in place of their regularly scheduled office visit pursuant to the emergency declaration under the [address] [facility] [address] waiver authority and the [place] [facility] Appropriations Act, this Virtual  Visit was conducted, with patient's consent, to reduce the patient's risk of exposure to COVID-19 and provide continuity of care for an established patient. A total of 43 minutes was spent on this visit, reviewing interval notes, interval testing results, ordering tests, refilling medications, documenting the findings in the note, patient education, counseling, and coordination of care as described above. All questions asked and answered. Services were provided through a video synchronous discussion virtually to substitute for in-person clinic visit. TODAY'S ORDERS    No orders of the defined types were placed in this encounter.     Future Appointments   Date Time Provider [facility]   10/28/2021  3:00 PM Kai Castaneda MD [facility] BS AMB   3/10/2022 11:40 AM Tonya Galvez MD [facility] [place] BS AMB     Sean Moreno MD, [facility], [place]    Adult Rheumatology   Rheumatology Ultrasound Certified  [facility]  A Part of [facility], [address]   Phone [phone]  Fax [phone]

## 2021-09-03 LAB
25(OH)D3+25(OH)D2 SERPL-MCNC: 35.2 NG/ML (ref 30–100)
ALBUMIN SERPL-MCNC: 4.5 G/DL (ref 3.8–4.8)
ALBUMIN/GLOB SERPL: 1.3 {RATIO} (ref 1.2–2.2)
ALP SERPL-CCNC: 75 IU/L (ref 48–121)
ALT SERPL-CCNC: 10 IU/L (ref 0–32)
AST SERPL-CCNC: 15 IU/L (ref 0–40)
BASOPHILS # BLD AUTO: 0.1 X10E3/UL (ref 0–0.2)
BASOPHILS NFR BLD AUTO: 1 %
BILIRUB SERPL-MCNC: 0.5 MG/DL (ref 0–1.2)
BUN SERPL-MCNC: 9 MG/DL (ref 6–24)
BUN/CREAT SERPL: 14 (ref 9–23)
CALCIUM SERPL-MCNC: 9.6 MG/DL (ref 8.7–10.2)
CHLORIDE SERPL-SCNC: 99 MMOL/L (ref 96–106)
CO2 SERPL-SCNC: 24 MMOL/L (ref 20–29)
CREAT SERPL-MCNC: 0.66 MG/DL (ref 0.57–1)
CRP SERPL-MCNC: 11 MG/L (ref 0–10)
EOSINOPHIL # BLD AUTO: 0.1 X10E3/UL (ref 0–0.4)
EOSINOPHIL NFR BLD AUTO: 1 %
ERYTHROCYTE [DISTWIDTH] IN BLOOD BY AUTOMATED COUNT: 13.3 % (ref 11.7–15.4)
ERYTHROCYTE [SEDIMENTATION RATE] IN BLOOD BY WESTERGREN METHOD: 38 MM/HR (ref 0–32)
GLOBULIN SER CALC-MCNC: 3.4 G/DL (ref 1.5–4.5)
GLUCOSE SERPL-MCNC: 171 MG/DL (ref 65–99)
HCT VFR BLD AUTO: 39 % (ref 34–46.6)
HGB BLD-MCNC: 13.3 G/DL (ref 11.1–15.9)
IMM GRANULOCYTES # BLD AUTO: 0 X10E3/UL (ref 0–0.1)
IMM GRANULOCYTES NFR BLD AUTO: 0 %
LYMPHOCYTES # BLD AUTO: 4 X10E3/UL (ref 0.7–3.1)
LYMPHOCYTES NFR BLD AUTO: 42 %
MCH RBC QN AUTO: 31.2 PG (ref 26.6–33)
MCHC RBC AUTO-ENTMCNC: 34.1 G/DL (ref 31.5–35.7)
MCV RBC AUTO: 92 FL (ref 79–97)
METHOTREXATE PG1: 49 NMOL/L RBC
METHOTREXATE PG2: 33 NMOL/L RBC
METHOTREXATE PG3: 44 NMOL/L RBC
METHOTREXATE PG4: 8 NMOL/L RBC
METHOTREXATE PG5: 1 NMOL/L RBC
METHOTREXATE-PG TOTAL: 134 NMOL/L RBC
MONOCYTES # BLD AUTO: 0.6 X10E3/UL (ref 0.1–0.9)
MONOCYTES NFR BLD AUTO: 6 %
MTXPGSRA INTERPRETATION: NORMAL
NEUTROPHILS # BLD AUTO: 4.8 X10E3/UL (ref 1.4–7)
NEUTROPHILS NFR BLD AUTO: 50 %
PLATELET # BLD AUTO: 359 X10E3/UL (ref 150–450)
POTASSIUM SERPL-SCNC: 4.1 MMOL/L (ref 3.5–5.2)
PROT SERPL-MCNC: 7.9 G/DL (ref 6–8.5)
RBC # BLD AUTO: 4.26 X10E6/UL (ref 3.77–5.28)
SODIUM SERPL-SCNC: 138 MMOL/L (ref 134–144)
URATE SERPL-MCNC: 7.1 MG/DL (ref 2.6–6.2)
WBC # BLD AUTO: 9.6 X10E3/UL (ref 3.4–10.8)

## 2021-09-07 NOTE — PROGRESS NOTES
The results were reviewed. Elevated inflammatory markers (ESR 38, CRP 11). Uric acid 7.1 mg/dL. Remaining labs are good.

## 2021-10-28 ENCOUNTER — VIRTUAL VISIT (OUTPATIENT)
Dept: RHEUMATOLOGY | Age: 41
End: 2021-10-28
Payer: MEDICARE

## 2021-10-28 DIAGNOSIS — M06.09 SERONEGATIVE RHEUMATOID ARTHRITIS OF MULTIPLE SITES (HCC): ICD-10-CM

## 2021-10-28 PROCEDURE — G8756 NO BP MEASURE DOC: HCPCS | Performed by: INTERNAL MEDICINE

## 2021-10-28 PROCEDURE — 99215 OFFICE O/P EST HI 40 MIN: CPT | Performed by: INTERNAL MEDICINE

## 2021-10-28 PROCEDURE — G8427 DOCREV CUR MEDS BY ELIG CLIN: HCPCS | Performed by: INTERNAL MEDICINE

## 2021-10-28 PROCEDURE — G8432 DEP SCR NOT DOC, RNG: HCPCS | Performed by: INTERNAL MEDICINE

## 2021-10-28 RX ORDER — SYRINGE-NEEDLE,INSULIN,0.5 ML 30 GX5/16"
1 SYRINGE, EMPTY DISPOSABLE MISCELLANEOUS
Qty: 12 EACH | Refills: 5 | Status: SHIPPED | OUTPATIENT
Start: 2021-10-30

## 2021-10-28 RX ORDER — METHOTREXATE 2.5 MG/1
TABLET ORAL
Qty: 96 TABLET | Refills: 1 | Status: SHIPPED | OUTPATIENT
Start: 2021-10-28 | End: 2021-10-28 | Stop reason: ALTCHOICE

## 2021-10-28 RX ORDER — METHOTREXATE 25 MG/ML
25 INJECTION, SOLUTION INTRA-ARTERIAL; INTRAMUSCULAR; INTRAVENOUS
Qty: 12 ML | Refills: 1 | Status: SHIPPED | OUTPATIENT
Start: 2021-10-28 | End: 2022-04-01 | Stop reason: SDUPTHER

## 2021-10-28 NOTE — PROGRESS NOTES
REASON FOR VISIT    This is a follow-up visit for Ms. Lexy Guerrier for     ICD-10-CM   1. Seronegative rheumatoid arthritis of multiple sites [facility])  M06.09     The patient has consented for synchronous (real-time) Telemedicine (audio-video technology) on 10/28/2021 for their care to be delivered over telemedicine in place of their regularly scheduled office visit pursuant to the emergency declaration under the [address] [place] [address], [address] [place] [address] and the [employer] Act, this Virtual  Visit was conducted, with patient's consent, to reduce the patient's risk of exposure to COVID-19 and provide continuity of care for an established patient. Services were provided through a video synchronous discussion virtually to substitute for in-person clinic visit. Inflammatory arthritis phenotype includes:  Anti-CCP positive: no  Rheumatoid factor positive: no  Erosive disease: yes  Extra-articular manifestations include: polyclonal gammopathy (elevated IgA, IgG), sensory neuropathy    Immunosuppression Screening (6/26/2020):  Quantiferon TB: negative  PPD:  Not performed  Hepatitis B: negative  Hepatitis C: negative    Therapy History includes:  Current DMARD therapy include: methotrexate 20 mg every Friday (7/03/2020 to 8/24/2021; 10/22/2021 to present)  Prior DMARD therapy include: none  Discontinued DMARDs because of inefficacy: None  Discontinued DMARDs because of side effects: None    HISTORY OF PRESENT ILLNESS    Ms. Lexy Guerrier returns for a follow-up. On her last visit, I asked her to hold methotrexate 20 mg every Friday due to unclear benefit since she had neuropathy    Today, she feels okay. She noted that being off methotrexate, she felt more aching pain and stiffness in her hands. She resumed it last week Friday. She stubbed her toe and was given a Medrol dose pack without benefit of her other pain.     Most recent toxicity monitoring by blood work was done on 8/27/2021 and did not reveal any significant adverse effects. I reviewed the patient's interval medical history, surgical history, medications, and allergies. The patient has not had any interval hospital admissions, infections, or surgeries. REVIEW OF SYSTEMS    A comprehensive review of systems was performed and pertinent results are documented in the HPI, review of systems is otherwise non-contributory. PAST MEDICAL HISTORY    She has a past medical history of Arthritis, Back pain, Diabetes ([address] Gout, Hypertension, Morbid obesity ([address] Neuropathy, Psychiatric disorder, and Unspecified sleep apnea. FAMILY HISTORY    Her family history includes Breast Cancer in her paternal grandmother; Breast Cancer (age of onset: 61) in her maternal grandmother; Cancer in her maternal grandmother and paternal grandmother; Crohn's Disease in her sister; Heart Disease in her father; Hypertension in her paternal grandmother; Lupus in her sister and sister; No Known Problems in her mother. SOCIAL HISTORY    She reports that she quit smoking about 5 years ago. She has never used smokeless tobacco. She reports current alcohol use. She reports that she does not use drugs. MEDICATIONS    Current Outpatient Medications   Medication Sig    methotrexate 25 mg/mL chemo injection 1 mL by SubCUTAneous route every seven (7) days.  [START ON 10/30/2021] Insulin Syringe-Needle U-100 1 mL 30 gauge x 5/16 syrg 1 Each by Does Not Apply route Every Saturday. To be used for methotrexate solution    omega 3-dha-epa-fish oil (Fish Oil) 100-160-1,000 mg cap Take  by mouth.  folic acid (FOLVITE) 1 mg tablet Take 1 Tab by mouth daily.  ergocalciferol (ERGOCALCIFEROL) 1,250 mcg (50,000 unit) capsule Take 1 Cap by mouth every seven (7) days. Indications: vitamin D deficiency (high dose therapy)    SITagliptin-metFORMIN (Janumet) 50-1,000 mg per tablet Take 1 Tab by mouth daily.     mometasone (NASONEX) 50 mcg/actuation nasal spray Nasonex 50 mcg/actuation Spray    Lyrica 150 mg capsule 150 mg two (2) times a day.  COLCRYS 0.6 mg tablet as needed.  hydrochlorothiazide (HYDRODIURIL) 25 mg tablet Take 25 mg by mouth daily.  esomeprazole (NEXIUM) 40 mg capsule Take 40 mg by mouth daily.  buPROPion XL (WELLBUTRIN XL) 150 mg tablet Take 150 mg by mouth every morning. Indications: DEPRESSION ASSOCIATED WITH MANIC DEPRESSIVE DISORDER    metoprolol (LOPRESSOR) 50 mg tablet Take  by mouth two (2) times a day. No current facility-administered medications for this visit. ALLERGIES    Allergies   Allergen Reactions    Latex Rash       PHYSICAL EXAMINATION    There were no vitals taken for this visit. There is no height or weight on file to calculate BMI. General: Patient is alert, oriented x 3, not in acute distress    HEENT:   Sclerae are not injected and appear moist.  There is no alopecia. Chest:  Breathing comfortably at room air. Musculoskeletal exam:  A comprehensive musculoskeletal exam was NOT performed for all joints of each upper and lower extremity and assessed for swelling, tenderness and range of motion.  Positive results are documented as below:    Previous Exam    Decreased passive extension of right wrist  Tenderness described as burning in interphalageal areas, PIPs and DIP  Bilateral MTP tenderness     Z-Deformities:   no  [place] Neck Deformities:  no  Boutonierre's Deformities:  no  Ulnar Deviation:   no  MCP Subluxation:  no     Joint Count 6/26/2020   MHAQ 0.5   Left wrist- Tender 1   Left wrist- Swollen 1   Left 1st MCP - Tender 1   Left 1st MCP - Swollen 0   Left 2nd MCP - Tender 1   Left 2nd MCP - Swollen 0   Left 3rd MCP - Tender 1   Left 3rd MCP - Swollen 0   Left 4th MCP - Tender 1   Left 4th MCP - Swollen 0   Left 5th MCP - Tender 1   Left 5th MCP - Swollen 0   Left thumb IP - Tender 0   Left thumb IP - Swollen 1   Left 2nd PIP - Tender 1 Left 2nd PIP - Swollen 0   Left 3rd PIP - Tender 1   Left 3rd PIP - Swollen 0   Left 4th PIP - Tender 1   Left 4th PIP - Swollen 0   Left 5th PIP - Tender 1   Left 5th PIP - Swollen 0   Right wrist- Tender 1   Right wrist- Swollen 1   Right 1st MCP - Tender 1   Right 1st MCP - Swollen 0   Right 2nd MCP - Tender 1   Right 2nd MCP - Swollen 0   Right 3rd MCP - Tender 1   Right 3rd MCP - Swollen 0   Right 4th MCP - Tender 1   Right 4th MCP - Swollen 0   Right 5th MCP - Tender 1   Right 5th MCP - Swollen 0   Right thumb IP - Tender 1   Right thumb IP - Swollen 0   Right 2nd PIP - Tender 1   Right 2nd PIP - Swollen 0   Right 3rd PIP - Tender 1   Right 3rd PIP - Swollen 0   Right 4th PIP - Tender 1   Right 4th PIP - Swollen 0   Right 5th PIP - Tender 1   Right 5th PIP - Swollen 0   Tender Joint Count (Total) 21   Swollen Joint Count (Total) 3     DATA REVIEW    Laboratory     Recent laboratory results were reviewed, summarized, and discussed with the patient. Imaging    Musculoskeletal Ultrasound    None    Radiographs    Left Knee 6/15/2021: mild tricompartmental joint space narrowing and small marginal osteophyte formations. No acute fracture or dislocation. There is no significant soft tissue swelling. There is no joint effusion. Bilateral Hand 7/02/2020: RIGHT: no fracture or other acute osseous or articular abnormality. The soft tissues are within normal limits. Mineralization is normal. There are no erosions. There is no evidence for CPPD. LEFT: no fracture or other acute osseous or articular abnormality. The soft tissues are within normal limits. Mineralization is normal. There are no erosions. There is no evidence for CPPD. Bilateral Foot 7/02/2020: RIGHT: no fracture. Mineralization is normal. There is an erosive change at the first metatarsal tarsal junction. A large plantar spur is seen. RIGHT: no fracture.  Mineralization is normal. There is a severe erosive change at the first metatarsal tarsal junction. A large plantar spur is seen. Bilateral Hand 12/31/2019: LEFT: These demonstrate mild arthritic change throughout the hand.  No significant acute findings. RIGHT: These demonstrate mild arthritic change throughout the hand.  No significant acute findings. She was prescribed a Medrol dose pack which did not help. CT Imaging    None    MR Imaging    None    DXA     None    EMG/NCS    EMG/NCS 12/09/2020: Extensive electrodiagnostic examination of the left upper and lower extremities reveals the following:  A generalized sensorimotor length-dependent polyneuropathy affecting the lower and upper extremities, severe in degree electrically with ongoing/active denervation. A left median neuropathy at or distal to the wrist, consistent with a clinical diagnosis of carpal tunnel syndrome, mild in degree electrically. A left ulnar sensory neuropathy, severe in degree electrically. No evidence of a left cervical or lumbosacral motor radiculopathy. ASSESSMENT AND PLAN    This is a follow-up visit for Ms. Kiesha Hanson. 1) Suspected Seronegative Erosive Rheumatoid Arthritis. She had bilateral wrist swelling and decreased ROM of right wrist, suspicious of radiocarpal arthritis, which would be a secondary pathology to injury or inflammatory arthritis. She denies injury. She had neuropathy which improved with Lyrica and was given a Medrol dose pack without benefit. She may have small fiber neuropathy as well. Her sisters have lupus. Her showed bilateral eosive first metatarsal tarsal arthropathy    She is maintained on methotrexate 20 mg every Friday with good tolerance since 7/03/2020. I held it to determine benefit and she did feel worse due to an increase of pain and stiffness in her hands, so resumed it last week. She has an overlap with neuropathy. I will change PO methotrexate to 25 mg SubQ weekly. She continues to have neuropathic burning pain confirmed on EMG/NCS. She is on Lyrica per her PCP.     2) Bilateral Hand and Foot Neuropathy. She reports burning pruritic pain in her hands and feet that is chronic. Lyrica helps. She has a history of carpal tunnel syndrome s/p release without relief but carpal tunnel syndrome would not involve the 5th digits. Vitamin B12 and folate were normal. I had referred her to neurology for further evaluation and testing. EMG/NCS showed neuropathy. She is following with Dr. Marisa Guerrero. 3) Long Term Use of Immunosuppressants. The patient remains on high risk immunomodulatory medications (methotrexate) and requires frequent toxicity monitoring by blood work to evaluate for toxicities. Respective labs were ordered (see below orders for details). 4) Vitamin D Deficiency. The patient's vitamin D level was 32.7 (previously 24.9). She is weekly ergocalciferol 50,000.      5) History of Gout. This was diagnosed by her podiatrist based on burning and throbbing pain in her feet. She feels at times, pain in her big toe and when she takes colchicine, it helps. Her history is not consistent with gout. Her uric acid was 7.1 mg/dL (previously 6.9, 7.4 mg/dL). 6) Left Knee pain. This seems to be patellofemoral arthralgia. I had referred her to physical therapy but she did not go. The patient has consented for synchronous (real-time) Telemedicine (audio-video technology) on 10/28/2021 for their care to be delivered over telemedicine in place of their regularly scheduled office visit pursuant to the emergency declaration under the [facility], [facility] waiver authority and the [employer] Act, this Virtual  Visit was conducted, with patient's consent, to reduce the patient's risk of exposure to COVID-19 and provide continuity of care for an established patient.      A total of 45 minutes was spent on this visit, reviewing interval notes, interval testing results, ordering tests, refilling medications, documenting the findings in the note, patient education, counseling, and coordination of care as described above. All questions asked and answered. Services were provided through a video synchronous discussion virtually to substitute for in-person clinic visit.     TODAY'S ORDERS    Orders Placed This Encounter    DISCONTD: methotrexate (RHEUMATREX) 2.5 mg tablet    methotrexate 25 mg/mL chemo injection    Insulin Syringe-Needle U-100 1 mL 30 gauge x 5/16 syrg     Future Appointments   Date Time Provider Florencio [place]   2/15/2022  2:40 PM Ryan Miner MD [facility] BS AMB   3/10/2022 11:40 AM Nancy Coles MD [facility] BS AMB     Nathan Griffin MD, [address] [facility]    Adult Rheumatology   Rheumatology Ultrasound Certified  [facility]  A Part of [facility]ALTH [facility], [address] [facility]   Phone [phone]  Fax [phone]

## 2021-11-02 ENCOUNTER — TELEPHONE (OUTPATIENT)
Dept: RHEUMATOLOGY | Age: 41
End: 2021-11-02

## 2021-11-02 NOTE — TELEPHONE ENCOUNTER
----- Message from George Pandya sent at 11/2/2021  2:50 PM EDT -----  Regarding: Dr. Kenrick Salazar Message/Vendor Calls    Caller's first and last name: Davina Dior ([address])          Reason for call: methotrexate 25 mg/mL chemo injection [[id number]]       Callback required yes/no and why: Yes  Prescription Questions      Best contact number(s): [phone]      Details to clarify the request: The pharmacy only has the prescription with preservative not without. Please call the pharmacy to verify the prescription.        George Pandya

## 2021-11-02 NOTE — TELEPHONE ENCOUNTER
Spoke with pharmacist and let her know that the preservative free vial is ok bu that she will need 24 ml because she will need to discard 1ml of each vial since it does not have preservative in it. Calleen Kussmaul stated an understanding.

## 2022-01-21 ENCOUNTER — TELEPHONE (OUTPATIENT)
Dept: RHEUMATOLOGY | Age: 42
End: 2022-01-21

## 2022-01-26 ENCOUNTER — TELEPHONE (OUTPATIENT)
Dept: RHEUMATOLOGY | Age: 42
End: 2022-01-26

## 2022-01-27 DIAGNOSIS — M06.09 SERONEGATIVE RHEUMATOID ARTHRITIS OF MULTIPLE SITES (HCC): Primary | ICD-10-CM

## 2022-01-27 NOTE — TELEPHONE ENCOUNTER
Left message for pt stating that she needs to get labs done before we can send in a refill. I mailed the labs to her home address and asked her to get them done ASAP so that we can send in a refill before she sees Dr. Romy Love.

## 2022-02-18 LAB
ALBUMIN SERPL-MCNC: 4.2 G/DL (ref 3.8–4.8)
ALBUMIN/GLOB SERPL: 1.4 {RATIO} (ref 1.2–2.2)
ALP SERPL-CCNC: 80 IU/L (ref 44–121)
ALT SERPL-CCNC: 12 IU/L (ref 0–32)
AST SERPL-CCNC: 12 IU/L (ref 0–40)
BASOPHILS # BLD AUTO: 0.1 X10E3/UL (ref 0–0.2)
BASOPHILS NFR BLD AUTO: 1 %
BILIRUB SERPL-MCNC: 0.4 MG/DL (ref 0–1.2)
BUN SERPL-MCNC: 6 MG/DL (ref 6–24)
BUN/CREAT SERPL: 9 (ref 9–23)
CALCIUM SERPL-MCNC: 9.4 MG/DL (ref 8.7–10.2)
CHLORIDE SERPL-SCNC: 101 MMOL/L (ref 96–106)
CO2 SERPL-SCNC: 20 MMOL/L (ref 20–29)
CREAT SERPL-MCNC: 0.64 MG/DL (ref 0.57–1)
CRP SERPL-MCNC: 6 MG/L (ref 0–10)
EOSINOPHIL # BLD AUTO: 0.1 X10E3/UL (ref 0–0.4)
EOSINOPHIL NFR BLD AUTO: 1 %
ERYTHROCYTE [DISTWIDTH] IN BLOOD BY AUTOMATED COUNT: 13.4 % (ref 11.7–15.4)
ERYTHROCYTE [SEDIMENTATION RATE] IN BLOOD BY WESTERGREN METHOD: 56 MM/HR (ref 0–32)
GLOBULIN SER CALC-MCNC: 3 G/DL (ref 1.5–4.5)
GLUCOSE SERPL-MCNC: 199 MG/DL (ref 65–99)
HCT VFR BLD AUTO: 38 % (ref 34–46.6)
HGB BLD-MCNC: 13.1 G/DL (ref 11.1–15.9)
IMM GRANULOCYTES # BLD AUTO: 0 X10E3/UL (ref 0–0.1)
IMM GRANULOCYTES NFR BLD AUTO: 1 %
LYMPHOCYTES # BLD AUTO: 3.8 X10E3/UL (ref 0.7–3.1)
LYMPHOCYTES NFR BLD AUTO: 43 %
MCH RBC QN AUTO: 32 PG (ref 26.6–33)
MCHC RBC AUTO-ENTMCNC: 34.5 G/DL (ref 31.5–35.7)
MCV RBC AUTO: 93 FL (ref 79–97)
METHOTREXATE PG1: 26 NMOL/L RBC
METHOTREXATE PG2: 15 NMOL/L RBC
METHOTREXATE PG3: 37 NMOL/L RBC
METHOTREXATE PG4: 8 NMOL/L RBC
METHOTREXATE PG5: 2 NMOL/L RBC
METHOTREXATE-PG TOTAL: 88 NMOL/L RBC
MONOCYTES # BLD AUTO: 0.5 X10E3/UL (ref 0.1–0.9)
MONOCYTES NFR BLD AUTO: 6 %
MTXPGSRA INTERPRETATION: NORMAL
NEUTROPHILS # BLD AUTO: 4.4 X10E3/UL (ref 1.4–7)
NEUTROPHILS NFR BLD AUTO: 48 %
PLATELET # BLD AUTO: 306 X10E3/UL (ref 150–450)
POTASSIUM SERPL-SCNC: 4.1 MMOL/L (ref 3.5–5.2)
PROT SERPL-MCNC: 7.2 G/DL (ref 6–8.5)
RBC # BLD AUTO: 4.1 X10E6/UL (ref 3.77–5.28)
SODIUM SERPL-SCNC: 140 MMOL/L (ref 134–144)
WBC # BLD AUTO: 8.9 X10E3/UL (ref 3.4–10.8)

## 2022-03-10 ENCOUNTER — TELEPHONE (OUTPATIENT)
Dept: SLEEP MEDICINE | Age: 42
End: 2022-03-10

## 2022-03-10 ENCOUNTER — VIRTUAL VISIT (OUTPATIENT)
Dept: SLEEP MEDICINE | Age: 42
End: 2022-03-10
Payer: MEDICARE

## 2022-03-10 ENCOUNTER — DOCUMENTATION ONLY (OUTPATIENT)
Dept: SLEEP MEDICINE | Age: 42
End: 2022-03-10

## 2022-03-10 VITALS — BODY MASS INDEX: 48.2 KG/M2 | HEIGHT: 63 IN | WEIGHT: 272 LBS

## 2022-03-10 DIAGNOSIS — Z86.59 H/O: DEPRESSION: ICD-10-CM

## 2022-03-10 DIAGNOSIS — G47.33 OSA (OBSTRUCTIVE SLEEP APNEA): Primary | ICD-10-CM

## 2022-03-10 DIAGNOSIS — I10 ESSENTIAL HYPERTENSION: ICD-10-CM

## 2022-03-10 PROCEDURE — G8417 CALC BMI ABV UP PARAM F/U: HCPCS | Performed by: INTERNAL MEDICINE

## 2022-03-10 PROCEDURE — 99213 OFFICE O/P EST LOW 20 MIN: CPT | Performed by: INTERNAL MEDICINE

## 2022-03-10 PROCEDURE — G8432 DEP SCR NOT DOC, RNG: HCPCS | Performed by: INTERNAL MEDICINE

## 2022-03-10 PROCEDURE — G8756 NO BP MEASURE DOC: HCPCS | Performed by: INTERNAL MEDICINE

## 2022-03-10 PROCEDURE — G8427 DOCREV CUR MEDS BY ELIG CLIN: HCPCS | Performed by: INTERNAL MEDICINE

## 2022-03-10 NOTE — PATIENT INSTRUCTIONS
[address] [facility] [place]. [place], [address]  Tel.  [phone]  Fax. [address] [facility] [address]  [place], [address] [facility]  Tel.  [phone]  Fax. [phone] [address]  Tel.  [phone]  Fax. [phone]     Learning About CPAP for Sleep Apnea  What is CPAP? CPAP is a small machine that you use at home every night while you sleep. It increases air pressure in your throat to keep your airway open. When you have sleep apnea, this can help you sleep better so you feel much better. CPAP stands for \"continuous positive airway pressure. \"  The CPAP machine will have one of the following:  · A mask that covers your nose and mouth  · Prongs that fit into your nose  · A mask that covers your nose only, the most common type. This type is called NCPAP. The N stands for \"nasal.\"  Why is it done? CPAP is usually the best treatment for obstructive sleep apnea. It is the first treatment choice and the most widely used. Your doctor may suggest CPAP if you have:  · Moderate to severe sleep apnea. · Sleep apnea and coronary artery disease (CAD) or heart failure. How does it help? · CPAP can help you have more normal sleep, so you feel less sleepy and more alert during the daytime. · CPAP may help keep heart failure or other heart problems from getting worse. · NCPAP may help lower your blood pressure. · If you use CPAP, your bed partner may also sleep better because you are not snoring or restless. What are the side effects? Some people who use CPAP have:  · A dry or stuffy nose and a sore throat. · Irritated skin on the face. · Sore eyes. · Bloating. If you have any of these problems, work with your doctor to fix them. Here are some things you can try:  · Be sure the mask or nasal prongs fit well. · See if your doctor can adjust the pressure of your CPAP. · If your nose is dry, try a humidifier.   · If your nose is runny or stuffy, try decongestant medicine or a steroid nasal spray. If these things do not help, you might try a different type of machine. Some machines have air pressure that adjusts on its own. Others have air pressures that are different when you breathe in than when you breathe out. This may reduce discomfort caused by too much pressure in your nose. Where can you learn more? Go to [employer]  Enter Kim Glass in the search box to learn more about \"Learning About CPAP for Sleep Apnea. \"   © 0720-1582 Healthwise, Incorporated. Care instructions adapted under license by [facility] [employer] (which disclaims liability or warranty for this information). This care instruction is for use with your licensed healthcare professional. If you have questions about a medical condition or this instruction, always ask your healthcare professional. [address] any warranty or liability for your use of this information. Content Version: 9.7.36561; Last Revised: January 11, 2010  PROPER SLEEP HYGIENE    What to avoid  · Do not have drinks with caffeine, such as coffee or black tea, for 8 hours before bed. · Do not smoke or use other types of tobacco near bedtime. Nicotine is a stimulant and can keep you awake. · Avoid drinking alcohol late in the evening, because it can cause you to wake in the middle of the night. · Do not eat a big meal close to bedtime. If you are hungry, eat a light snack. · Do not drink a lot of water close to bedtime, because the need to urinate may wake you up during the night. · Do not read or watch TV in bed. Use the bed only for sleeping and sexual activity. What to try  · Go to bed at the same time every night, and wake up at the same time every morning. Do not take naps during the day. · Keep your bedroom quiet, dark, and cool. · Get regular exercise, but not within 3 to 4 hours of your bedtime. .  · Sleep on a comfortable pillow and mattress.   · If watching the clock makes you anxious, turn it facing away from you so you cannot see the time. · If you worry when you lie down, start a worry book. Well before bedtime, write down your worries, and then set the book and your concerns aside. · Try meditation or other relaxation techniques before you go to bed. · If you cannot fall asleep, get up and go to another room until you feel sleepy. Do something relaxing. Repeat your bedtime routine before you go to bed again. · Make your house quiet and calm about an hour before bedtime. Turn down the lights, turn off the TV, log off the computer, and turn down the volume on music. This can help you relax after a busy day. Drowsy Driving: The [address] [facility] cites drowsiness as a causing factor in more than 161,795 police reported crashes annually, resulting in 76,000 injuries and 1,500 deaths. Other surveys suggest 55% of people polled have driven while drowsy in the past year, 23% had fallen asleep but not crashed, 3% crashed, and 2% had and accident due to drowsy driving. Who is at risk? Young Drivers: One study of drowsy driving accidents states that 55% of the drivers were under 25 years. Of those, 75% were male. Shift Workers and Travelers: People who work overnight or travel across time zones frequently are at higher risk of experiencing Circadian Rhythm Disorders. They are trying to work and function when their body is programed to sleep. Sleep Deprived: Lack of sleep has a serious impact on your ability to pay attention or focus on a task. Consistently getting less than the average of 8 hours your body needs creates partial or cumulative sleep deprivation. Untreated Sleep Disorders: Sleep Apnea, Narcolepsy, R.L.S., and other sleep disorders (untreated) prevent a person from getting enough restful sleep. This leads to excessive daytime sleepiness and increases the risk for drowsy driving accidents by up to 7 times.   Medications / Alcohol: Even over the counter medications can cause drowsiness. Medications that impair a drivers attention should have a warning label. Alcohol naturally makes you sleepy and on its own can cause accidents. Combined with excessive drowsiness its effects are amplified. Signs of Drowsy Driving:   * You don't remember driving the last few miles   * You may drift out of your darcy   * You are unable to focus and your thoughts wander   * You may yawn more often than normal   * You have difficulty keeping your eyes open / nodding off   * Missing traffic signs, speeding, or tailgating  Prevention-   Good sleep hygiene, lifestyle and behavioral choices have the most impact on drowsy driving. There is no substitute for sleep and the average person requires 8 hours nightly. If you find yourself driving drowsy, stop and sleep. Consider the sleep hygiene tips provided during your visit as well. Medication Refill Policy: Refills for all medications require 1 week advance notice. Please have your pharmacy fax a refill request. We are unable to fax, or call in \"controled substance\" medications and you will need to pick these prescriptions up from our office. [employer] Activation    Thank you for requesting access to [employer]. Please follow the instructions below to securely access and download your online medical record. [employer] allows you to send messages to your doctor, view your test results, renew your prescriptions, schedule appointments, and more. How Do I Sign Up? 1. In your internet browser, go to [email or web address]. 2. Click on the First Time User? Click Here link in the Sign In box. You will see the New Member Sign Up page. 3. Enter your [employer] Access Code exactly as it appears below. You will not need to use this code after youve completed the sign-up process. If you do not sign up before the expiration date, you must request a new code. [employer] Access Code:  Activation code not generated  Current [employer] Status: Active (This is the date your [employer] access code will )    4. Enter the last four digits of your Social Security Number (xxxx) and Date of Birth (mm/dd/yyyy) as indicated and click Submit. You will be taken to the next sign-up page. 5. Create a Online [employer]er[employer] ID. This will be your [employer] login ID and cannot be changed, so think of one that is secure and easy to remember. 6. Create a [employer] password. You can change your password at any time. 7. Enter your Password Reset Question and Answer. This can be used at a later time if you forget your password. 8. Enter your e-mail address. You will receive e-mail notification when new information is available in [address] 9. Click Sign Up. You can now view and download portions of your medical record. 10. Click the Download Summary menu link to download a portable copy of your medical information. Additional Information    If you have questions, please call [phone]. Remember, [employer] is NOT to be used for urgent needs. For medical emergencies, dial 911.

## 2022-03-10 NOTE — PROGRESS NOTES
[address] [place]. [place], [address]  Tel.  [phone]  Fax. [address]  [place], [address] [facility]  Tel.  [phone]  Fax. [phone] [address] [facility] Alma Ryder   Tel.  [phone]  Fax. Λουτράκι Esteban (: 1980) is a 35 y.o. female, established patient, seen for positive airway pressure follow-up and evaluation of the following chief complaint(s):   Sleep Problem (Consent to VV appt in [place] _send link to [phone]_yearly follow up )       Valentine Edmond was last seen by me on 2021, prior notes reviewed in detail. Split-Night Polysomnogram (PSG) performed on 2015 showed an AHI of 48.2/hr with a lowest SpO2 of 89%, she was optimally titrated on CPAP Therapy.       ASSESSMENT/PLAN:    ICD-10-CM ICD-9-CM    1. RILEY (obstructive sleep apnea)  G47.33 327.23    2. Essential hypertension  I10 401.9    3. H/O: depression  Z86.59 V11.8    4. BMI 45.0-49.9, adult (HCC)  Z68.42 V85.42        On ResMed:  AirSense 10 AutoSet    Settings:  Min EPAP: 06 cmH2O  Max EPAP: 20 cmH2O    EPR: 2    1. Sleep Apnea -   * She is adherent with PAP therapy and PAP continues to benefit patient and remains necessary for control of her sleep apnea. Continue on current pressures    * Supplies for his device were ordered as noted below:    Orders Placed This Encounter    AMB SUPPLY ORDER     Diagnosis: (G47.33) RILEY (obstructive sleep apnea)  (primary encounter diagnosis)     Replacement Supplies for Positive Airway Pressure Therapy Device:   Duration of need: 99 months.  Nasal Cushion (Replace) 2 per month.  Nasal Interface Mask 1 every 3 months.  Headgear 1 every 6 months.  Tubing with heating element 1 every 3 months.  Filter(s) Disposable 2 per month.  Filter(s) Non-Disposable 1 every 6 months. .   [address] [facility] for Humidifier (Replace) 1 every 6 months.       Yasmin JEAN Garry Grissom MD, FAASM; NPI: [id number]    Electronically signed. Date:- 03/10/22     * Counseling was provided regarding the importance of regular PAP use with emphasis on ensuring sufficient total sleep time, proper sleep hygiene, and safe driving. Re-enforced proper and regular cleaning for the device. * She was asked to contact our office for any problems regarding PAP therapy. 2. Hypertension -  continue on current regimen, she will continue to monitor her BP and follow up with her primary care provider for reevaluation/adjustment of medications if warranted. I have reviewed the relationship between hypertension as it relates to sleep-disordered breathing. 3. H/O Depression -  continue on current regimen, she will continue to monitor her mood and follow up with her care provider for reevaluation/adjustment of medications if warranted. I have reviewed the relationship between mood as it relates to sleep-disordered breathing. 4. Recommended a dedicated weight loss program through appropriate diet and exercise regimen as significant weight reduction has been shown to reduce severity of obstructive sleep apnea. Follow-up and Dispositions    · Return for follow-up with nurse practioner in 12 months. SUBJECTIVE/OBJECTIVE:    She  is seen today for follow up on PAP device and reports no problems using the device. The following concerns identified:    Drowsiness no Problems exhaling no   Snoring no Forget to put on no   Mask Comfortable yes Can't fall asleep no   Dry Mouth no Mask falls off no   Air Leaking no Frequent awakenings no       She admits that her sleep has improved on PAP therapy using nasal mask and heated tubing.      Review of device download indicated:    Usage Days >= 4 hours 100 %  Median Usage  8 hour 3 minutes    Median Device Pressure 7.8 cmH2O  Device Pressure 95% 10.4 cmH2O    Median Leak 0.2 L/min  95% Leak 8.8 L/min    Average AHI: 1.1 /hr which reflects improved sleep breathing condition. [place] Sleepiness Score: 8   Modified F.O.S.Q. Score Total / 2: 15       Sleep Review of Systems: notable for Negative difficulty falling asleep; Negative awakenings at night; Negative  early morning headaches; mild  memory problems; mild  concentration issues; Negative  chest pain; Negative  shortness of breath;  Negative rashes or itching; Negative heartburn / belching / flatulence; Negative  significant mood issues; No afternoon naps per week. Vitals reported by patient     Patient-Reported Vitals 3/10/2022   Patient-Reported Weight 272lb   Patient-Reported Height -   Patient-Reported Pulse -   Patient-Reported Temperature -   Patient-Reported Systolic  -   Patient-Reported Diastolic -   Patient-Reported Peak Flow -      Calculated BMI 48.18 kg/m2    Physical Exam completed by visual and auditory observation of patient with verbal input from patient. General:   Alert, oriented, not in acute distress   Eyes:  Anicteric Sclerae; no obvious strabismus   Nose:  No obvious nasal septum deviation    Neck:   Midline trachea, no visible mass   Chest/Lungs:  Respiratory effort normal, no visualized signs of difficulty breathing or respiratory distress   CVS:  No JVD   Extremities:  No obvious rashes noted on face, neck, or hands   Neuro:  No facial asymmetry, no focal deficits; no obvious tremor    Psych:  Normal affect,  normal countenance     Brando Sherman is being evaluated by a Virtual Visit (video visit) encounter to address concerns as mentioned above. A caregiver was present when appropriate. Due to this being a TeleHealth encounter (During IDD-23 public health emergency), evaluation of the following organ systems was limited: Vitals/Constitutional/EENT/Resp/CV/GI//MS/Neuro/Skin/Heme-Lymph-Imm.   Pursuant to the emergency declaration under the [address] [facility] [address] waiver authority and the [employer] Appropriations Act, this Virtual Visit was conducted with patient's (and/or legal guardian's) consent, to reduce the patient's risk of exposure to COVID-19 and provide necessary medical care. Patient identification was verified at the start of the visit: YES using name and date of birth. Patient's phone number [phone] (home)  was confirmed for accuracy. She gives permission for messages regarding results and appointments to be left at that number. Services were provided through a video synchronous discussion virtually to substitute for in-person clinic visit. I was in the office while conducting this encounter, patient located at their home or alternate location of their choice. An electronic signature was used to authenticate this note. Cata Troncoso MD, FAASM  Electronically signed.  03/10/22

## 2022-03-18 PROBLEM — Z79.60 LONG-TERM USE OF IMMUNOSUPPRESSANT MEDICATION: Status: ACTIVE | Noted: 2020-08-27

## 2022-03-19 PROBLEM — M06.09 SERONEGATIVE RHEUMATOID ARTHRITIS OF MULTIPLE SITES (HCC): Status: ACTIVE | Noted: 2020-08-27

## 2022-03-19 PROBLEM — E55.9 VITAMIN D DEFICIENCY: Status: ACTIVE | Noted: 2020-07-01

## 2022-03-20 PROBLEM — R10.11 RUQ ABDOMINAL PAIN: Status: ACTIVE | Noted: 2019-01-28

## 2022-04-01 ENCOUNTER — OFFICE VISIT (OUTPATIENT)
Dept: RHEUMATOLOGY | Age: 42
End: 2022-04-01
Payer: MEDICARE

## 2022-04-01 VITALS
BODY MASS INDEX: 49.54 KG/M2 | HEART RATE: 77 BPM | WEIGHT: 279.6 LBS | TEMPERATURE: 98.1 F | HEIGHT: 63 IN | DIASTOLIC BLOOD PRESSURE: 84 MMHG | OXYGEN SATURATION: 97 % | RESPIRATION RATE: 20 BRPM | SYSTOLIC BLOOD PRESSURE: 135 MMHG

## 2022-04-01 DIAGNOSIS — M06.09 SERONEGATIVE RHEUMATOID ARTHRITIS OF MULTIPLE SITES (HCC): Primary | ICD-10-CM

## 2022-04-01 DIAGNOSIS — B37.0 THRUSH, ORAL: ICD-10-CM

## 2022-04-01 DIAGNOSIS — M47.816 LUMBAR FACET ARTHROPATHY: ICD-10-CM

## 2022-04-01 DIAGNOSIS — Z79.60 LONG-TERM USE OF IMMUNOSUPPRESSANT MEDICATION: ICD-10-CM

## 2022-04-01 DIAGNOSIS — K11.7 XEROSTOMIA: ICD-10-CM

## 2022-04-01 DIAGNOSIS — G63 POLYNEUROPATHY ASSOCIATED WITH UNDERLYING DISEASE (HCC): ICD-10-CM

## 2022-04-01 PROCEDURE — 99215 OFFICE O/P EST HI 40 MIN: CPT | Performed by: INTERNAL MEDICINE

## 2022-04-01 RX ORDER — METHOTREXATE 25 MG/ML
25 INJECTION, SOLUTION INTRA-ARTERIAL; INTRAMUSCULAR; INTRAVENOUS
Qty: 12 ML | Refills: 1
Start: 2022-04-01 | End: 2022-04-08 | Stop reason: SDUPTHER

## 2022-04-01 RX ORDER — NYSTATIN 100000 [USP'U]/ML
200000 SUSPENSION ORAL 4 TIMES DAILY
Qty: 60 ML | Refills: 0 | Status: SHIPPED | OUTPATIENT
Start: 2022-04-01 | End: 2022-04-08

## 2022-04-01 NOTE — PROGRESS NOTES
REASON FOR VISIT    This is an in-person follow-up visit for Ms. Candis Ramírez for     ICD-10-CM   1. Seronegative rheumatoid arthritis of multiple sites ([facility] [address]  M06.09       Inflammatory arthritis phenotype includes:  Anti-CCP positive: no  Rheumatoid factor positive: no  Erosive disease: yes  Extra-articular manifestations include: polyclonal gammopathy (elevated IgA, IgG), sensory neuropathy    Immunosuppression Screening (6/26/2020):  Quantiferon TB: negative  PPD:  Not performed  Hepatitis B: negative  Hepatitis C: negative    Therapy History includes:  Current DMARD therapy include: methotrexate 25 mg every Friday (7/03/2020 to 8/24/2021; 10/22/2021 to present)  Prior DMARD therapy include: none  Discontinued DMARDs because of inefficacy: None  Discontinued DMARDs because of side effects: None    HISTORY OF PRESENT ILLNESS    Ms. Candis Ramírez returns for a follow-up. Most of pain now is in hands. Hands and ankles appear swollen when painful. Pains are worst in the morning, takes about an hour to get moving. Still having burning pain under feet. Sensitive to areas that get poked. Describes separate and less frequent flares of \"gout,\" once every 2-3 months, with flares of pain in the feet. Takes colchicine one pill at the first sign of a flare, but doesn't take more than that. Oral burning, dry mouth. Worse since finished an antibiotic ~1mo ago for a toe infection. Feels balance is off. Following with Dr. Delvin Pereyra, identified severe generalized sensorimotor polyneuropathy she suspects is primarily secondary to diabetes. Pt is being treated with Lyrica. REVIEW OF SYSTEMS    A comprehensive review of systems was performed and pertinent results are documented in the HPI, review of systems is otherwise non-contributory.     PAST MEDICAL HISTORY    She has a past medical history of Arthritis, Back pain, Diabetes ([facility] [address] Gout, Hypertension, Morbid obesity ([facility] [address] Neuropathy, Psychiatric disorder, and Unspecified sleep apnea. FAMILY HISTORY    Her family history includes Breast Cancer in her paternal grandmother; Breast Cancer (age of onset: 61) in her maternal grandmother; Cancer in her maternal grandmother and paternal grandmother; Crohn's Disease in her sister; Heart Disease in her father; Hypertension in her paternal grandmother; Lupus in her sister and sister; No Known Problems in her mother. SOCIAL HISTORY    She reports that she quit smoking about 5 years ago. She has never used smokeless tobacco. She reports current alcohol use. She reports that she does not use drugs. MEDICATIONS    Current Outpatient Medications   Medication Sig    methotrexate 25 mg/mL chemo injection 1 mL by SubCUTAneous route every seven (7) days.  Insulin Syringe-Needle U-100 1 mL 30 gauge x 5/16 syrg 1 Each by Does Not Apply route Every Saturday. To be used for methotrexate solution    omega 3-dha-epa-fish oil (Fish Oil) 100-160-1,000 mg cap Take  by mouth.  folic acid (FOLVITE) 1 mg tablet Take 1 Tab by mouth daily.  ergocalciferol (ERGOCALCIFEROL) 1,250 mcg (50,000 unit) capsule Take 1 Cap by mouth every seven (7) days. Indications: vitamin D deficiency (high dose therapy)    SITagliptin-metFORMIN (Janumet) 50-1,000 mg per tablet Take 1 Tab by mouth daily.  Lyrica 150 mg capsule 150 mg two (2) times a day.  COLCRYS 0.6 mg tablet as needed.  hydrochlorothiazide (HYDRODIURIL) 25 mg tablet Take 25 mg by mouth daily.  esomeprazole (NEXIUM) 40 mg capsule Take 40 mg by mouth daily.  buPROPion XL (WELLBUTRIN XL) 150 mg tablet Take 150 mg by mouth every morning. Indications: DEPRESSION ASSOCIATED WITH MANIC DEPRESSIVE DISORDER    metoprolol (LOPRESSOR) 50 mg tablet Take  by mouth two (2) times a day.  mometasone (NASONEX) 50 mcg/actuation nasal spray Nasonex 50 mcg/actuation Spray (Patient not taking: Reported on 4/1/2022)     No current facility-administered medications for this visit. ALLERGIES    Allergies   Allergen Reactions    Latex Rash       PHYSICAL EXAMINATION    Visit Vitals  /84 (BP 1 Location: Left upper arm, BP Patient Position: Sitting)   Pulse 77   Temp 98.1 °F (36.7 °C) (Oral)   Resp 20   Ht 5' 3\" (1.6 m)   Wt 279 lb 9.6 oz (126.8 kg)   SpO2 97%   BMI 49.53 kg/m²     Body mass index is 49.53 kg/m². General:  The patient is well developed, well nourished, alert, and in no apparent distress. Eyes: Sclera are anicteric. No conjunctival injection. HEENT:  Oropharynx is clear. No oral ulcers. Lingual scalloping, trace thrush No cervical or supraclavicular lymphadenopathy. Lungs:  Clear to auscultation bilaterally, without wheeze or stridor. Normal respiratory effort. Cor:  Regular rate and rhythm. No murmur rub or gallop. Abdomen: Soft, non-tender, without hepatomegaly or masses. Extremities: No calf tenderness or edema. Warm and well perfused. Skin:  No significant abnormalities. Neuro: Nonfocal  Musculoskeletal:    A comprehensive musculoskeletal exam was performed for all joints of each upper and lower extremity and assessed for swelling, tenderness and range of motion. Results are documented as below:  Diffuse tenderness/allodynia in hands without synovitis  No evidence of synovitis in the small joints of the hands, wrists, shoulders, elbows, hips, knees or ankles. DATA REVIEW    Laboratory     Recent laboratory results were reviewed, summarized, and discussed with the patient. Imaging    Musculoskeletal Ultrasound    None    Radiographs    Left Knee 6/15/2021: mild tricompartmental joint space narrowing and small marginal osteophyte formations. No acute fracture or dislocation. There is no significant soft tissue swelling. There is no joint effusion. Bilateral Hand 7/02/2020: RIGHT: no fracture or other acute osseous or articular abnormality. The soft tissues are within normal limits. Mineralization is normal. There are no erosions. There is no evidence for CPPD. LEFT: no fracture or other acute osseous or articular abnormality. The soft tissues are within normal limits. Mineralization is normal. There are no erosions. There is no evidence for CPPD. Bilateral Foot 7/02/2020: RIGHT: no fracture. Mineralization is normal. There is an erosive change at the first metatarsal tarsal junction. A large plantar spur is seen. RIGHT: no fracture. Mineralization is normal. There is a severe erosive change at the first metatarsal tarsal junction. A large plantar spur is seen. Bilateral Hand 12/31/2019: LEFT: These demonstrate mild arthritic change throughout the hand.  No significant acute findings. RIGHT: These demonstrate mild arthritic change throughout the hand.  No significant acute findings. She was prescribed a Medrol dose pack which did not help. CT Imaging    None    MR Imaging    None    DXA     None    EMG/NCS    EMG/NCS 12/09/2020: Extensive electrodiagnostic examination of the left upper and lower extremities reveals the following:  A generalized sensorimotor length-dependent polyneuropathy affecting the lower and upper extremities, severe in degree electrically with ongoing/active denervation. A left median neuropathy at or distal to the wrist, consistent with a clinical diagnosis of carpal tunnel syndrome, mild in degree electrically. A left ulnar sensory neuropathy, severe in degree electrically. No evidence of a left cervical or lumbosacral motor radiculopathy. ASSESSMENT AND PLAN    This is a follow-up visit for Ms. Claudy Gutierrez for seronegative erosive rheumatoid arthritis well-controlled clinically on recently started subQ methotrexate, but having difficulty managing drawing up and injecting fluid via syringe with her significant underlying neuropathy in the hands. Will apply for subQ pens for injection. Treating thrush with nystatin, and checking SSA/SSB antibodies for e/o Sjogren's driving her xerostomia.     1. Seronegative rheumatoid arthritis of multiple sites (HCC)  - C REACTIVE PROTEIN, QT; Future  - CBC WITH AUTOMATED DIFF; Future  - METABOLIC PANEL, COMPREHENSIVE; Future  - SED RATE (ESR); Future  - SJOGREN'S ABS, SSA AND SSB; Future  - REFERRAL TO PHYSICAL THERAPY; Future  - methotrexate, PF, (Rasuvo, PF,) 25 mg/0.5 mL atIn; 25 mg by SubCUTAneous route every seven (7) days. Dispense: 2 mL; Refill: 5    2. Thrush, oral  - nystatin (MYCOSTATIN) 100,000 unit/mL suspension; Take 2 mL by mouth four (4) times daily for 7 days. swish and spit  Dispense: 60 mL; Refill: 0    3. Xerostomia  - SJOGREN'S ABS, SSA AND SSB; Future    4. Long-term use of immunosuppressant medication  - CBC WITH AUTOMATED DIFF; Future  - METABOLIC PANEL, COMPREHENSIVE; Future  - methotrexate, PF, (Rasuvo, PF,) 25 mg/0.5 mL atIn; 25 mg by SubCUTAneous route every seven (7) days. Dispense: 2 mL; Refill: 5    5. Lumbar facet arthropathy  - REFERRAL TO PHYSICAL THERAPY; Future    6. Polyneuropathy associated with underlying disease ([facility] [address]  - methotrexate, PF, (Rasuvo, PF,) 25 mg/0.5 mL atIn; 25 mg by SubCUTAneous route every seven (7) days. Dispense: 2 mL; Refill: 5      Patient Instructions   1. For now, continue methotrexate 25mg (1mL) every 7 days on Fridays. On days you can't find someone to inject you, OK to drink your dose with juice. I'm applying for Rasuvo, which is a pen-type delivery device for methotrexate which should be more practical for you. 2. Physical therapy for the low back and lower body pain. 3. Keep up the weight loss, remember the fourfold reduction in knee forces for every unit of weight lost (Arthritis Rheum. 2005 Jul;52(7):2026-32)! 4. For the thrush, let's try nystatin swish and spit 4x/day. If this helps but your symptoms recur when it's done, let me know and I'll prescribe clotrimazole lozenges instead. 5. Labs before next visit. 6. Return in 4 months.           TODAY'S ORDERS    Orders Placed This Encounter    C REACTIVE PROTEIN, QT    CBC WITH AUTOMATED DIFF    METABOLIC PANEL, COMPREHENSIVE    SED RATE (ESR)    SJOGREN'S ABS, SSA AND SSB    REFERRAL TO PHYSICAL THERAPY    nystatin (MYCOSTATIN) 100,000 unit/mL suspension    DISCONTD: methotrexate 25 mg/mL chemo injection     Future Appointments   Date Time Provider Florencio Deani   4/1/2022  1:30 PM Carlos Manzo MD [facility] BS AMB   3/15/2023 11:40 AM Tomasz Tian NP [facility] BS AMB     Face to face time: 35 minutes  Note preparation and records review day of service: 20 minutes  Total provider time day of service: 54 minutes      Soila Cardoza MD [facility]    Adult Rheumatology   [address], [address] [place] [address]   Phone [phone]  Fax [phone]

## 2022-04-01 NOTE — PROGRESS NOTES
Chief Complaint   Patient presents with    Arthritis     hands     1. Have you been to the ER, urgent care clinic since your last visit? Hospitalized since your last visit? No    2. Have you seen or consulted any other health care providers outside of the [address] [place] [address] since your last visit? Include any pap smears or colon screening.  No

## 2022-04-01 NOTE — PATIENT INSTRUCTIONS
1. For now, continue methotrexate 25mg (1mL) every 7 days on Fridays. On days you can't find someone to inject you, OK to drink your dose with juice. I'm applying for Rasuvo, which is a pen-type delivery device for methotrexate which should be more practical for you. 2. Physical therapy for the low back and lower body pain. 3. Keep up the weight loss, remember the fourfold reduction in knee forces for every unit of weight lost (Arthritis Rheum. 2005 Jul;52(7):2026-32)! 4. For the thrush, let's try nystatin swish and spit 4x/day. If this helps but your symptoms recur when it's done, let me know and I'll prescribe clotrimazole lozenges instead. 5. Labs before next visit. 6. Return in 4 months.

## 2022-04-08 DIAGNOSIS — M06.09 SERONEGATIVE RHEUMATOID ARTHRITIS OF MULTIPLE SITES (HCC): ICD-10-CM

## 2022-04-08 RX ORDER — METHOTREXATE 25 MG/ML
25 INJECTION, SOLUTION INTRA-ARTERIAL; INTRAMUSCULAR; INTRAVENOUS
Qty: 12 ML | Refills: 1 | Status: CANCELLED
Start: 2022-04-08

## 2022-04-11 RX ORDER — METHOTREXATE 25 MG/ML
25 INJECTION, SOLUTION INTRA-ARTERIAL; INTRAMUSCULAR; INTRAVENOUS
Qty: 12 ML | Refills: 0 | Status: SHIPPED | OUTPATIENT
Start: 2022-04-11 | End: 2022-04-20

## 2022-04-16 RX ORDER — METHOTREXATE 25 MG/.5ML
25 INJECTION, SOLUTION SUBCUTANEOUS
Qty: 2 ML | Refills: 5 | Status: SHIPPED | OUTPATIENT
Start: 2022-04-16 | End: 2022-04-18 | Stop reason: SDUPTHER

## 2022-04-18 DIAGNOSIS — Z79.60 LONG-TERM USE OF IMMUNOSUPPRESSANT MEDICATION: ICD-10-CM

## 2022-04-18 DIAGNOSIS — G63 POLYNEUROPATHY ASSOCIATED WITH UNDERLYING DISEASE (HCC): ICD-10-CM

## 2022-04-18 DIAGNOSIS — M06.09 SERONEGATIVE RHEUMATOID ARTHRITIS OF MULTIPLE SITES (HCC): ICD-10-CM

## 2022-04-19 ENCOUNTER — DOCUMENTATION ONLY (OUTPATIENT)
Dept: PHARMACY | Age: 42
End: 2022-04-19

## 2022-04-19 RX ORDER — METHOTREXATE 25 MG/.5ML
25 INJECTION, SOLUTION SUBCUTANEOUS
Qty: 2 ML | Refills: 5 | Status: SHIPPED | OUTPATIENT
Start: 2022-04-19 | End: 2022-04-20 | Stop reason: SDUPTHER

## 2022-04-19 NOTE — PROGRESS NOTES
[facility] [address] [facility] Update    Date: 04/19/22    Kevin Bose 1980    Medication: Rasuvo 25 mg/0.5 ml    Prior Authorization: through 12/31/2022    Prescription needs to be transferred to [facility] (phone: [phone]). Travon Perla ([phone]) was notified that this specialty prescription needs to be transferred to the insurance mandated specialty pharmacy above.      Thuy Mendez, [address] at [facility], [address]   [place], [address]  phone: [phone]   fax: [phone]

## 2022-04-20 ENCOUNTER — TELEPHONE (OUTPATIENT)
Dept: RHEUMATOLOGY | Age: 42
End: 2022-04-20

## 2022-04-20 DIAGNOSIS — M06.09 SERONEGATIVE RHEUMATOID ARTHRITIS OF MULTIPLE SITES (HCC): ICD-10-CM

## 2022-04-20 DIAGNOSIS — G63 POLYNEUROPATHY ASSOCIATED WITH UNDERLYING DISEASE (HCC): ICD-10-CM

## 2022-04-20 DIAGNOSIS — Z79.60 LONG-TERM USE OF IMMUNOSUPPRESSANT MEDICATION: ICD-10-CM

## 2022-04-20 RX ORDER — METHOTREXATE 25 MG/.5ML
25 INJECTION, SOLUTION SUBCUTANEOUS
Qty: 2 ML | Refills: 5 | Status: SHIPPED | OUTPATIENT
Start: 2022-04-20

## 2022-04-20 NOTE — TELEPHONE ENCOUNTER
Pt notified that her medication has been approved and request for transfer to [facility] [place] has been sent to Dr. Jv Cerna.

## 2022-04-20 NOTE — TELEPHONE ENCOUNTER
Patient called stating her prescription for Rasuvo needs to go to [facility] [address] [phone] and need prior authorization.     [phone]

## 2022-04-23 LAB
ALBUMIN SERPL-MCNC: 4.3 G/DL (ref 3.8–4.8)
ALBUMIN/GLOB SERPL: 1.3 {RATIO} (ref 1.2–2.2)
ALP SERPL-CCNC: 63 IU/L (ref 44–121)
ALT SERPL-CCNC: 11 IU/L (ref 0–32)
AST SERPL-CCNC: 13 IU/L (ref 0–40)
BASOPHILS # BLD AUTO: 0.1 X10E3/UL (ref 0–0.2)
BASOPHILS NFR BLD AUTO: 1 %
BILIRUB SERPL-MCNC: 0.4 MG/DL (ref 0–1.2)
BUN SERPL-MCNC: 8 MG/DL (ref 6–24)
BUN/CREAT SERPL: 13 (ref 9–23)
CALCIUM SERPL-MCNC: 9.4 MG/DL (ref 8.7–10.2)
CHLORIDE SERPL-SCNC: 102 MMOL/L (ref 96–106)
CO2 SERPL-SCNC: 20 MMOL/L (ref 20–29)
CREAT SERPL-MCNC: 0.64 MG/DL (ref 0.57–1)
CRP SERPL-MCNC: 12 MG/L (ref 0–10)
EGFR: 112 ML/MIN/1.73
ENA SS-A AB SER-ACNC: <0.2 AI (ref 0–0.9)
ENA SS-B AB SER-ACNC: <0.2 AI (ref 0–0.9)
EOSINOPHIL # BLD AUTO: 0.1 X10E3/UL (ref 0–0.4)
EOSINOPHIL NFR BLD AUTO: 1 %
ERYTHROCYTE [DISTWIDTH] IN BLOOD BY AUTOMATED COUNT: 12.3 % (ref 11.7–15.4)
ERYTHROCYTE [SEDIMENTATION RATE] IN BLOOD BY WESTERGREN METHOD: 25 MM/HR (ref 0–32)
GLOBULIN SER CALC-MCNC: 3.3 G/DL (ref 1.5–4.5)
GLUCOSE SERPL-MCNC: 131 MG/DL (ref 65–99)
HCT VFR BLD AUTO: 36.9 % (ref 34–46.6)
HGB BLD-MCNC: 12.6 G/DL (ref 11.1–15.9)
IMM GRANULOCYTES # BLD AUTO: 0 X10E3/UL (ref 0–0.1)
IMM GRANULOCYTES NFR BLD AUTO: 0 %
LYMPHOCYTES # BLD AUTO: 3.9 X10E3/UL (ref 0.7–3.1)
LYMPHOCYTES NFR BLD AUTO: 40 %
MCH RBC QN AUTO: 31.3 PG (ref 26.6–33)
MCHC RBC AUTO-ENTMCNC: 34.1 G/DL (ref 31.5–35.7)
MCV RBC AUTO: 92 FL (ref 79–97)
MONOCYTES # BLD AUTO: 0.7 X10E3/UL (ref 0.1–0.9)
MONOCYTES NFR BLD AUTO: 7 %
NEUTROPHILS # BLD AUTO: 4.9 X10E3/UL (ref 1.4–7)
NEUTROPHILS NFR BLD AUTO: 51 %
PLATELET # BLD AUTO: 293 X10E3/UL (ref 150–450)
POTASSIUM SERPL-SCNC: 4.3 MMOL/L (ref 3.5–5.2)
PROT SERPL-MCNC: 7.6 G/DL (ref 6–8.5)
RBC # BLD AUTO: 4.02 X10E6/UL (ref 3.77–5.28)
SODIUM SERPL-SCNC: 140 MMOL/L (ref 134–144)
WBC # BLD AUTO: 9.7 X10E3/UL (ref 3.4–10.8)

## 2022-06-21 ENCOUNTER — TRANSCRIBE ORDER (OUTPATIENT)
Dept: SCHEDULING | Age: 42
End: 2022-06-21

## 2022-06-21 DIAGNOSIS — Z12.31 VISIT FOR SCREENING MAMMOGRAM: Primary | ICD-10-CM

## 2022-07-13 ENCOUNTER — TELEPHONE (OUTPATIENT)
Dept: RHEUMATOLOGY | Age: 42
End: 2022-07-13

## 2022-07-13 DIAGNOSIS — M06.09 SERONEGATIVE RHEUMATOID ARTHRITIS OF MULTIPLE SITES (HCC): ICD-10-CM

## 2022-07-13 RX ORDER — FOLIC ACID 1 MG/1
1 TABLET ORAL DAILY
Qty: 90 TABLET | Refills: 5 | Status: SHIPPED | OUTPATIENT
Start: 2022-07-13

## 2022-07-21 ENCOUNTER — HOSPITAL ENCOUNTER (OUTPATIENT)
Dept: MAMMOGRAPHY | Age: 42
Discharge: HOME | End: 2022-07-21
Attending: NURSE PRACTITIONER
Payer: MEDICARE

## 2022-07-21 DIAGNOSIS — Z12.31 VISIT FOR SCREENING MAMMOGRAM: ICD-10-CM

## 2022-07-21 PROCEDURE — 77063 BREAST TOMOSYNTHESIS BI: CPT

## 2022-10-20 ENCOUNTER — TELEPHONE (OUTPATIENT)
Dept: NEUROLOGY | Age: 42
End: 2022-10-20

## 2022-10-26 ENCOUNTER — TELEPHONE (OUTPATIENT)
Dept: NEUROLOGY | Age: 42
End: 2022-10-26

## 2022-10-26 NOTE — TELEPHONE ENCOUNTER
Called and spoke to the Pt.  Per Dr. Lugo Offer:  Please have her schedule a follow up for these new issues  Able to come in tomorrow to for an opening 11:40

## 2022-10-27 ENCOUNTER — OFFICE VISIT (OUTPATIENT)
Dept: NEUROLOGY | Age: 42
End: 2022-10-27
Payer: MEDICARE

## 2022-10-27 VITALS
WEIGHT: 281 LBS | HEART RATE: 103 BPM | DIASTOLIC BLOOD PRESSURE: 82 MMHG | RESPIRATION RATE: 18 BRPM | SYSTOLIC BLOOD PRESSURE: 132 MMHG | HEIGHT: 63 IN | BODY MASS INDEX: 49.79 KG/M2 | OXYGEN SATURATION: 98 %

## 2022-10-27 DIAGNOSIS — E11.42 TYPE 2 DIABETES MELLITUS WITH DIABETIC POLYNEUROPATHY, UNSPECIFIED WHETHER LONG TERM INSULIN USE (HCC): ICD-10-CM

## 2022-10-27 DIAGNOSIS — G62.9 POLYNEUROPATHY: Primary | ICD-10-CM

## 2022-10-27 DIAGNOSIS — G56.02 CARPAL TUNNEL SYNDROME OF LEFT WRIST: ICD-10-CM

## 2022-10-27 DIAGNOSIS — G56.22 ULNAR NEUROPATHY OF LEFT UPPER EXTREMITY: ICD-10-CM

## 2022-10-27 DIAGNOSIS — R20.9 SENSORY DISTURBANCE: ICD-10-CM

## 2022-10-27 PROCEDURE — G8432 DEP SCR NOT DOC, RNG: HCPCS | Performed by: PSYCHIATRY & NEUROLOGY

## 2022-10-27 PROCEDURE — G8752 SYS BP LESS 140: HCPCS | Performed by: PSYCHIATRY & NEUROLOGY

## 2022-10-27 PROCEDURE — 3078F DIAST BP <80 MM HG: CPT | Performed by: PSYCHIATRY & NEUROLOGY

## 2022-10-27 PROCEDURE — G8754 DIAS BP LESS 90: HCPCS | Performed by: PSYCHIATRY & NEUROLOGY

## 2022-10-27 PROCEDURE — G8427 DOCREV CUR MEDS BY ELIG CLIN: HCPCS | Performed by: PSYCHIATRY & NEUROLOGY

## 2022-10-27 PROCEDURE — 3074F SYST BP LT 130 MM HG: CPT | Performed by: PSYCHIATRY & NEUROLOGY

## 2022-10-27 PROCEDURE — 99214 OFFICE O/P EST MOD 30 MIN: CPT | Performed by: PSYCHIATRY & NEUROLOGY

## 2022-10-27 PROCEDURE — G8417 CALC BMI ABV UP PARAM F/U: HCPCS | Performed by: PSYCHIATRY & NEUROLOGY

## 2022-10-27 PROCEDURE — 2022F DILAT RTA XM EVC RTNOPTHY: CPT | Performed by: PSYCHIATRY & NEUROLOGY

## 2022-10-27 PROCEDURE — 3046F HEMOGLOBIN A1C LEVEL >9.0%: CPT | Performed by: PSYCHIATRY & NEUROLOGY

## 2022-10-27 RX ORDER — LIRAGLUTIDE 6 MG/ML
6 INJECTION SUBCUTANEOUS DAILY
COMMUNITY
Start: 2022-06-16

## 2022-10-27 RX ORDER — SENNOSIDES 25 MG/1
TABLET, FILM COATED ORAL
Qty: 15 G | Refills: 0 | Status: SHIPPED | OUTPATIENT
Start: 2022-10-27

## 2022-10-27 RX ORDER — BUSPIRONE HYDROCHLORIDE 30 MG/1
30 TABLET ORAL 2 TIMES DAILY
COMMUNITY
Start: 2022-09-06 | End: 2022-12-05

## 2022-10-27 RX ORDER — TERBINAFINE HYDROCHLORIDE 250 MG/1
TABLET ORAL
COMMUNITY
Start: 2022-10-20

## 2022-10-27 RX ORDER — AMLODIPINE BESYLATE 10 MG/1
TABLET ORAL
COMMUNITY

## 2022-10-27 NOTE — PROGRESS NOTES
Chief Complaint   Patient presents with    Follow-up     Numbness and tingling/itchy: Patient reports lower extremities numbness has gotten better. Reports itching and tingling in hands and arms that started this month.       Visit Vitals  /82 (BP 1 Location: Left upper arm, BP Patient Position: Sitting)   Pulse (!) 103   Resp 18   Ht 5' 3\" (1.6 m)   Wt 281 lb (127.5 kg)   SpO2 98%   BMI 49.78 kg/m²

## 2022-10-27 NOTE — PROGRESS NOTES
Neurology Clinic Follow up Note    Patient ID:  Shannan Mckinney  [id number]  38 y.o.  1980      Ms. Lorena Pemberton is here for follow up today of  No chief complaint on file. Last Appointment With Me:  5/26/2021    \"Bianca TI Pemberton is presenting for evaluation of distal extremity paresthesias. Patient reports onset of lower extremity paresthesias approximately 7 years ago describe as a burning/tingling or numbness involving both feet symmetrically not extending above the ankles. Symptoms are rather constant. Symptoms are worse when resting or in the evenings. She has noted some slight weakness into the LLE when climbing stairs with associated L knee pain. She endorses some imbalance at times. She has chronic LBP as well without significant radicular symptoms. She also reports h/o hand paresthesias s/p CTS release approximately 9 years ago initially improved with slight worsening burning/swelling, tingling into all fingers and decreased hand  b/l over the past year L>R. \"  Interval History:   Patient returns for f/u of distal extremity paresthesias. She reports \"itching\" in the palm of her R hand extending to the R forearm. She has experienced this in the past however duration of symptoms has increased. She denies burning/tingling sensation and states this is not similar to her neuropathy symptoms. No reported skin rash/lesions, although does exhibit some mild excoriations from pruritis. Distal extremity paresthesias related to her PN are stable. DM is doing better per patient, now maintained on insulin. Lyrica is helpful for neuropathic pain symptoms. PMHx/ PSHx/ FHx/ SHx:  Reviewed and unchanged previous visit.    Past Medical History:   Diagnosis Date    Arthritis      in hands    Back pain     Diabetes ([facility]     prediabetic    Gout     Hypertension     Morbid obesity ([facility]     Neuropathy     Psychiatric disorder     depression    Unspecified sleep apnea          ROS:  Comprehensive review of systems negative except for as noted above. Objective:       Meds:  Current Outpatient Medications   Medication Sig Dispense Refill    busPIRone (BUSPAR) 30 mg tablet Take 30 mg by mouth two (2) times a day. liraglutide (Victoza 2-Jarvis) 0.6 mg/0.1 mL (18 mg/3 mL) pnij 6 mL by Injection route daily. terbinafine HCL (LAMISIL) 250 mg tablet       folic acid (FOLVITE) 1 mg tablet Take 1 Tablet by mouth daily. 90 Tablet 5    methotrexate, PF, (Rasuvo, PF,) 25 mg/0.5 mL atIn 25 mg by SubCUTAneous route every seven (7) days. 2 mL 5    Insulin Syringe-Needle U-100 1 mL 30 gauge x 5/16 syrg 1 Each by Does Not Apply route Every Saturday. To be used for methotrexate solution 12 Each 5    omega 3-dha-epa-fish oil (Fish Oil) 100-160-1,000 mg cap Take  by mouth.      ergocalciferol (ERGOCALCIFEROL) 1,250 mcg (50,000 unit) capsule Take 1 Cap by mouth every seven (7) days. Indications: vitamin D deficiency (high dose therapy) 12 Cap 4    SITagliptin-metFORMIN (Janumet) 50-1,000 mg per tablet Take 1 Tab by mouth daily. Lyrica 150 mg capsule 150 mg two (2) times a day. COLCRYS 0.6 mg tablet as needed. hydrochlorothiazide (HYDRODIURIL) 25 mg tablet Take 25 mg by mouth daily. esomeprazole (NEXIUM) 40 mg capsule Take 40 mg by mouth daily. buPROPion XL (WELLBUTRIN XL) 150 mg tablet Take 150 mg by mouth every morning. Indications: DEPRESSION ASSOCIATED WITH MANIC DEPRESSIVE DISORDER      metoprolol (LOPRESSOR) 50 mg tablet Take  by mouth two (2) times a day.       amLODIPine (NORVASC) 10 mg tablet amlodipine 10 mg tablet   TAKE 1 TABLET BY MOUTH EVERY DAY      mometasone (NASONEX) 50 mcg/actuation nasal spray Nasonex 50 mcg/actuation Spray (Patient not taking: No sig reported)         Exam:  Visit Vitals  /82 (BP 1 Location: Left upper arm, BP Patient Position: Sitting)   Pulse (!) 103   Resp 18   Ht 5' 3\" (1.6 m)   Wt 281 lb (127.5 kg)   LMP 04/17/2013   SpO2 98%   BMI 49.78 kg/m² NEUROLOGICAL EXAM:  General: Awake, alert, speech fluent  CN: PERRL, EOMI without nystagmus, VFF to confrontation, facial sensation and strength are normal and symmetric, hearing is intact to finger rub bilaterally, palate and tongue movements are intact and symmetric. Motor: Normal tone, bulk and strength bilaterally. Reflexes: 2/4, plantar stimulation is flexor. Coordination: FNF, ALMA, HTS intact. Sensation: LT intact throughout. Gait: Normal-based and steady. LABS  Results for orders placed or performed in visit on 04/01/22   C REACTIVE PROTEIN, QT   Result Value Ref Range    C-Reactive Protein, Qt 12 (H) 0 - 10 mg/L   CBC WITH AUTOMATED DIFF   Result Value Ref Range    WBC 9.7 3.4 - 10.8 x10E3/uL    RBC 4.02 3.77 - 5.28 x10E6/uL    HGB 12.6 11.1 - 15.9 g/dL    HCT 36.9 34.0 - 46.6 %    MCV 92 79 - 97 fL    MCH 31.3 26.6 - 33.0 pg    MCHC 34.1 31.5 - 35.7 g/dL    RDW 12.3 11.7 - 15.4 %    PLATELET 534 304 - 604 x10E3/uL    NEUTROPHILS 51 Not Estab. %    Lymphocytes 40 Not Estab. %    MONOCYTES 7 Not Estab. %    EOSINOPHILS 1 Not Estab. %    BASOPHILS 1 Not Estab. %    ABS. NEUTROPHILS 4.9 1.4 - 7.0 x10E3/uL    Abs Lymphocytes 3.9 (H) 0.7 - 3.1 x10E3/uL    ABS. MONOCYTES 0.7 0.1 - 0.9 x10E3/uL    ABS. EOSINOPHILS 0.1 0.0 - 0.4 x10E3/uL    ABS. BASOPHILS 0.1 0.0 - 0.2 x10E3/uL    IMMATURE GRANULOCYTES 0 Not Estab. %    ABS. IMM. GRANS. 0.0 0.0 - 0.1 B94V2/VN   METABOLIC PANEL, COMPREHENSIVE   Result Value Ref Range    Glucose 131 (H) 65 - 99 mg/dL    BUN 8 6 - 24 mg/dL    Creatinine 0.64 0.57 - 1.00 mg/dL    eGFR 112 >59 mL/min/1.73    BUN/Creatinine ratio 13 9 - 23    Sodium 140 134 - 144 mmol/L    Potassium 4.3 3.5 - 5.2 mmol/L    Chloride 102 96 - 106 mmol/L    CO2 20 20 - 29 mmol/L    Calcium 9.4 8.7 - 10.2 mg/dL    Protein, total 7.6 6.0 - 8.5 g/dL    Albumin 4.3 3.8 - 4.8 g/dL    GLOBULIN, TOTAL 3.3 1.5 - 4.5 g/dL    A-G Ratio 1.3 1.2 - 2.2    Bilirubin, total 0.4 0.0 - 1.2 mg/dL    Alk. phosphatase 63 44 - 121 IU/L    AST (SGOT) 13 0 - 40 IU/L    ALT (SGPT) 11 0 - 32 IU/L   SED RATE (ESR)   Result Value Ref Range    Sed rate (ESR) 25 0 - 32 mm/hr   SJOGREN'S ABS, SSA AND SSB   Result Value Ref Range    Sjogren's Anti-SS-A <0.2 0.0 - 0.9 AI    Sjogren's Anti-SS-B <0.2 0.0 - 0.9 AI       IMAGING:  MRI Results (most recent):  No results found for this or any previous visit. EMG:  Extensive electrodiagnostic examination of the left upper and lower extremities reveals the followin. A generalized sensorimotor length-dependent polyneuropathy affecting the lower and upper extremities, severe in degree electrically with ongoing/active denervation. 2. A left median neuropathy at or distal to the wrist, consistent with a clinical diagnosis of carpal tunnel syndrome, mild in degree electrically. 3. A left ulnar sensory neuropathy, severe in degree electrically and likely related to #1 above. 4. No evidence of a left cervical or lumbosacral motor radiculopathy. Assessment:     Encounter Diagnoses     ICD-10-CM ICD-9-CM   1. Polyneuropathy  G62.9 356.9   2. Carpal tunnel syndrome of left wrist  G56.02 354.0   3. Ulnar neuropathy of left upper extremity  G56.22 354.2   4. Type 2 diabetes mellitus with diabetic polyneuropathy, unspecified whether long term insulin use (HCC)  E11.42 250.60     357.2   5. Sensory disturbance  R20.9 66.0   38year old pleasant AAF with a h/o seronegative RA, DM, b/l CTS s/p release previously seen for progressive distal lower and upper extremity paresthesias for several years. EMG completed as outlined above with findings of a severe generalized sensorimotor polyneuropathy, mild L CTS and L ulnar sensory neuropathy. I suspect her polyneuropathy is secondary to diabetes. Worsening upper extremity paresthesias s/p CTS release is most likely explained by superimposed polyneuropathy now extending to her hands b/l.    We discussed titration of Lyrica in the evenings to assist with neuropathic pain symptoms. This is currently being managed by her PCP. Reviewed importance of strict glucose control to prevent further progression of her polyneuropathy. She returns for follow up due to dysesthesias/pruritis of the distal RUE without focal deficits on examination. Will proceed with electrodiagnostic testing of the RUE for further evaluation and exclusion of potential mononeuropathy, less likely cervical radiculopathy contributing to sensory disturbance. Will use topical lidocaine to assist with symptoms. Results will be reviewed directly after her procedure. Plan:   EMG RUE  Trial of topical Lidocaine ointment  Continue Lyrica for neuropathic pain symptoms  Discussed strict control of glucose to prevent progression of polyneuropathy    Follow-up and Dispositions    Return if symptoms worsen or fail to improve. I have discussed the diagnosis with the patient today and the intended plan as seen in the above orders with both the patient as well as referring provider and/or PCP via electronic correspondence. The patient has received an after-visit summary and questions were answered concerning future plans. I have discussed medication side effects and warnings with the patient as well.       Signed:  Diana Butts DO  10/27/2022

## 2022-11-03 ENCOUNTER — OFFICE VISIT (OUTPATIENT)
Dept: RHEUMATOLOGY | Age: 42
End: 2022-11-03
Payer: MEDICARE

## 2022-11-03 ENCOUNTER — TELEPHONE (OUTPATIENT)
Dept: NEUROLOGY | Age: 42
End: 2022-11-03

## 2022-11-03 ENCOUNTER — APPOINTMENT (OUTPATIENT)
Dept: FAMILY MEDICINE CLINIC | Age: 42
End: 2022-11-03

## 2022-11-03 VITALS
DIASTOLIC BLOOD PRESSURE: 86 MMHG | SYSTOLIC BLOOD PRESSURE: 143 MMHG | HEIGHT: 63 IN | WEIGHT: 277 LBS | HEART RATE: 86 BPM | BODY MASS INDEX: 49.08 KG/M2 | OXYGEN SATURATION: 96 % | TEMPERATURE: 98.2 F | RESPIRATION RATE: 16 BRPM

## 2022-11-03 DIAGNOSIS — G62.9 PERIPHERAL POLYNEUROPATHY: ICD-10-CM

## 2022-11-03 DIAGNOSIS — M06.09 SERONEGATIVE RHEUMATOID ARTHRITIS OF MULTIPLE SITES (HCC): Primary | ICD-10-CM

## 2022-11-03 DIAGNOSIS — Z79.60 LONG-TERM USE OF IMMUNOSUPPRESSANT MEDICATION: ICD-10-CM

## 2022-11-03 DIAGNOSIS — M79.7 FIBROMYALGIA: ICD-10-CM

## 2022-11-03 RX ORDER — BLOOD SUGAR DIAGNOSTIC
STRIP MISCELLANEOUS
COMMUNITY
Start: 2022-09-11

## 2022-11-03 RX ORDER — MINERAL OIL
ENEMA (ML) RECTAL
COMMUNITY
Start: 2022-10-07

## 2022-11-03 RX ORDER — METOPROLOL SUCCINATE 50 MG/1
TABLET, EXTENDED RELEASE ORAL
COMMUNITY
Start: 2022-09-05

## 2022-11-03 RX ORDER — ATORVASTATIN CALCIUM 10 MG/1
TABLET, FILM COATED ORAL
COMMUNITY
Start: 2022-09-06

## 2022-11-03 RX ORDER — ALLOPURINOL 300 MG/1
TABLET ORAL
COMMUNITY
Start: 2022-10-04

## 2022-11-03 NOTE — PROGRESS NOTES
Chief Complaint   Patient presents with    Joint Pain     1. Have you been to the ER, urgent care clinic since your last visit? Hospitalized since your last visit? No    2. Have you seen or consulted any other health care providers outside of the [address] [place] [address] since your last visit? Include any pap smears or colon screening.  No

## 2022-11-03 NOTE — TELEPHONE ENCOUNTER
Tami calling about prescription for (Lidocaine Cream). It says it's being given for itching but that's not what it's for. It for pain and not itching. Please advise.

## 2022-11-03 NOTE — PROGRESS NOTES
REASON FOR VISIT    This is an in-person follow-up visit for Ms. Rahat Montalvo for     ICD-10-CM   1. Seronegative rheumatoid arthritis of multiple sites ([facility]  M06.09       Inflammatory arthritis phenotype includes:  Anti-CCP positive: no  Rheumatoid factor positive: no  Erosive disease: yes  Extra-articular manifestations include: polyclonal gammopathy (elevated IgA, IgG), sensory neuropathy    Immunosuppression Screening (6/26/2020):  Quantiferon TB: negative  PPD:  Not performed  Hepatitis B: negative  Hepatitis C: negative    Therapy History includes:  Current DMARD therapy include: methotrexate 25 mg every Friday (7/03/2020 to 8/24/2021; 10/22/2021 to present)  Prior DMARD therapy include: none  Discontinued DMARDs because of inefficacy: None  Discontinued DMARDs because of side effects: None    HISTORY OF PRESENT ILLNESS    Ms. Rahat Montalvo returns for a follow-up. Pt has been on 25mg of subQ methotrexate since her visit last April. Pt still has hand pain. She says that this has eased up a little but since starting Methotrexate. She also has numbness in her L pointer finger and thumb. She takes 300mg of Allopurinol daily. Pt takes Lyrica for pain. She says that this has been \"hit or miss\" and it does not help as much as it used to. Pt still gets intermittent flares of \"gout\" in her feet. The gout symptoms only present in her big toes. She takes a colchicine at the first sign of a flare, which helps. Pt is sore throughout her upper shoulders. Pt recently saw the neurologist because she was having an itchy burning feeling on her R forearm. Pt reports that she is also sore throughout her muscles. Pt notes that she \"wobbles\" in the shower     Pt denies rashes. Pt has noticed that her eyes have been increasingly dry and itchy. She uses over the counter artificial tears about every 2 weeks. Pt has been a little wheezy lately. She does not use any inhalers.  She has not been diagnosed with asthma. REVIEW OF SYSTEMS    A comprehensive review of systems was performed and pertinent results are documented in the HPI, review of systems is otherwise non-contributory. PAST MEDICAL HISTORY    She has a past medical history of Arthritis, Back pain, Diabetes ([address] Gout, Hypertension, Morbid obesity ([address] Neuropathy, Psychiatric disorder, and Unspecified sleep apnea. FAMILY HISTORY    Her family history includes Breast Cancer in her paternal grandmother; Breast Cancer (age of onset: 61) in her maternal grandmother; Cancer in her maternal grandmother and paternal grandmother; Crohn's Disease in her sister; Heart Disease in her father; Hypertension in her paternal grandmother; Lupus in her sister and sister; No Known Problems in her mother. SOCIAL HISTORY    She reports that she quit smoking about 6 years ago. She has never used smokeless tobacco. She reports current alcohol use. She reports that she does not use drugs. MEDICATIONS    Current Outpatient Medications   Medication Sig    amLODIPine (NORVASC) 10 mg tablet amlodipine 10 mg tablet   TAKE 1 TABLET BY MOUTH EVERY DAY    busPIRone (BUSPAR) 30 mg tablet Take 30 mg by mouth two (2) times a day. liraglutide (Victoza 2-Jarvis) 0.6 mg/0.1 mL (18 mg/3 mL) pnij 6 mL by Injection route daily. terbinafine HCL (LAMISIL) 250 mg tablet     lidocaine 5 % topical cream Apply  to affected area two (2) times daily as needed for Itching. folic acid (FOLVITE) 1 mg tablet Take 1 Tablet by mouth daily. methotrexate, PF, (Rasuvo, PF,) 25 mg/0.5 mL atIn 25 mg by SubCUTAneous route every seven (7) days. Insulin Syringe-Needle U-100 1 mL 30 gauge x 5/16 syrg 1 Each by Does Not Apply route Every Saturday. To be used for methotrexate solution    omega 3-dha-epa-fish oil (Fish Oil) 100-160-1,000 mg cap Take  by mouth.    ergocalciferol (ERGOCALCIFEROL) 1,250 mcg (50,000 unit) capsule Take 1 Cap by mouth every seven (7) days.  Indications: vitamin D deficiency (high dose therapy)    SITagliptin-metFORMIN (Janumet) 50-1,000 mg per tablet Take 1 Tab by mouth daily. mometasone (NASONEX) 50 mcg/actuation nasal spray Nasonex 50 mcg/actuation Spray (Patient not taking: No sig reported)    Lyrica 150 mg capsule 150 mg two (2) times a day. COLCRYS 0.6 mg tablet as needed. hydrochlorothiazide (HYDRODIURIL) 25 mg tablet Take 25 mg by mouth daily. esomeprazole (NEXIUM) 40 mg capsule Take 40 mg by mouth daily. buPROPion XL (WELLBUTRIN XL) 150 mg tablet Take 150 mg by mouth every morning. Indications: DEPRESSION ASSOCIATED WITH MANIC DEPRESSIVE DISORDER    metoprolol (LOPRESSOR) 50 mg tablet Take  by mouth two (2) times a day. No current facility-administered medications for this visit. ALLERGIES    Allergies   Allergen Reactions    Latex Rash    Lisinopril Cough       PHYSICAL EXAMINATION    There were no vitals taken for this visit. There is no height or weight on file to calculate BMI. General:  The patient is well developed, well nourished, alert, and in no apparent distress. Eyes: Sclera are anicteric. No conjunctival injection. HEENT:  Oropharynx is clear. No oral ulcers. Adequate salivary pooling. No cervical or supraclavicular lymphadenopathy. Lungs:  Clear to auscultation bilaterally, without wheeze or stridor. Normal respiratory effort. Cor:  Regular rate and rhythm. No murmur rub or gallop. Abdomen: Soft, non-tender, without hepatomegaly or masses. Extremities: No calf tenderness or edema. Warm and well perfused. Skin:  No significant abnormalities. Neuro: Nonfocal  Musculoskeletal:    A comprehensive musculoskeletal exam was performed for all joints of each upper and lower extremity and assessed for swelling, tenderness and range of motion. Results are documented as below:  No evidence of synovitis in the small joints of the hands, wrists, shoulders, elbows, hips, knees or ankles.     ___  General:  The patient is well developed, well nourished, alert, and in no apparent distress. Eyes: Sclera are anicteric. No conjunctival injection. HEENT:  Oropharynx is clear. No oral ulcers. Lingual scalloping, trace thrush No cervical or supraclavicular lymphadenopathy. Lungs:  Clear to auscultation bilaterally, without wheeze or stridor. Normal respiratory effort. Cor:  Regular rate and rhythm. No murmur rub or gallop. Abdomen: Soft, non-tender, without hepatomegaly or masses. Extremities: No calf tenderness or edema. Warm and well perfused. Skin:  No significant abnormalities. Neuro: Nonfocal  Musculoskeletal:    A comprehensive musculoskeletal exam was performed for all joints of each upper and lower extremity and assessed for swelling, tenderness and range of motion. Results are documented as below:  Diffuse tenderness/allodynia in hands without synovitis  No evidence of synovitis in the small joints of the hands, wrists, shoulders, elbows, hips, knees or ankles. DATA REVIEW    Laboratory   4/22/22: Sjogren's Anti-SS-A <0.2, Sjogren's Anti-SS-B <0.2, ESR 25, Cr 0.64, LFT WNL, CBC WNL, CRP 12 mg/L  Recent laboratory results were reviewed, summarized, and discussed with the patient. Imaging    Musculoskeletal Ultrasound    None    Radiographs    Left Knee 6/15/2021: mild tricompartmental joint space narrowing and small marginal osteophyte formations. No acute fracture or dislocation. There is no significant soft tissue swelling. There is no joint effusion. Bilateral Hand 7/02/2020: RIGHT: no fracture or other acute osseous or articular abnormality. The soft tissues are within normal limits. Mineralization is normal. There are no erosions. There is no evidence for CPPD. LEFT: no fracture or other acute osseous or articular abnormality. The soft tissues are within normal limits. Mineralization is normal. There are no erosions. There is no evidence for CPPD.      Bilateral Foot 7/02/2020: RIGHT: no fracture. Mineralization is normal. There is an erosive change at the first metatarsal tarsal junction. A large plantar spur is seen. RIGHT: no fracture. Mineralization is normal. There is a severe erosive change at the first metatarsal tarsal junction. A large plantar spur is seen. Bilateral Hand 12/31/2019: LEFT: These demonstrate mild arthritic change throughout the hand. No significant acute findings. RIGHT: These demonstrate mild arthritic change throughout the hand. No significant acute findings. She was prescribed a Medrol dose pack which did not help. CT Imaging    None    MR Imaging    None    DXA     None    EMG/NCS    EMG/NCS 12/09/2020: Extensive electrodiagnostic examination of the left upper and lower extremities reveals the following:  A generalized sensorimotor length-dependent polyneuropathy affecting the lower and upper extremities, severe in degree electrically with ongoing/active denervation. A left median neuropathy at or distal to the wrist, consistent with a clinical diagnosis of carpal tunnel syndrome, mild in degree electrically. A left ulnar sensory neuropathy, severe in degree electrically. No evidence of a left cervical or lumbosacral motor radiculopathy. ASSESSMENT AND PLAN    This is a follow-up visit for Ms. Jerrald Kawasaki for seronegative erosive rheumatoid arthritis well-controlled clinically on recently started subQ methotrexate, with significant overlay of pain sensitization. Reviewed estella chi, referring to physical therapy, and encouraged continued coordination with her PCP for pain management. 1. Seronegative rheumatoid arthritis of multiple sites (HCC)  - Cont methotrexate 25mg subQ weekly  - XR HAND RT MIN 3 V; Future  - XR HAND LT MIN 3 V; Future  - XR FOOT RT MIN 3 V; Future  - XR FOOT LT MIN 3 V; Future  - URIC ACID; Future  - C REACTIVE PROTEIN, QT; Future  - SED RATE (ESR); Future  - CK;  Future  - IMMUNOGLOBULINS, G/A/M, QT.; Future  - REFERRAL TO PHYSICAL THERAPY    2. Long-term use of immunosuppressant medication  - CBC WITH AUTOMATED DIFF; Future  - METABOLIC PANEL, COMPREHENSIVE; Future  - IMMUNOGLOBULINS, G/A/M, QT.; Future  - REFERRAL TO PHYSICAL THERAPY    3. Fibromyalgia  - URIC ACID; Future  - C REACTIVE PROTEIN, QT; Future  - CBC WITH AUTOMATED DIFF; Future  - METABOLIC PANEL, COMPREHENSIVE; Future  - SED RATE (ESR); Future  - CK; Future  - IMMUNOGLOBULINS, G/A/M, QT.; Future  - REFERRAL TO PHYSICAL THERAPY    4. Peripheral polyneuropathy  - C REACTIVE PROTEIN, QT; Future  - SED RATE (ESR); Future  - IMMUNOGLOBULINS, G/A/M, QT.; Future  - REFERRAL TO PHYSICAL THERAPY      Patient Instructions   1) Continue to take 25mg of sub cutaneous Methotrexate once per week with daily folic acid. 2) You can take 650mg of Tylenol up to 3 times a day for joint pain. You can also use Voltaren gel as needed for hand pain. 3) Some people find Rickey Chi beneficial for pain management. You can search for Sanchez Bowman on you tube and follow along with him or try and find a class at your local [facility]      4) Continue to follow up with MELE Mehta for pain management. 5) I am going to refer you to physical therapy. You can take this referral to wherever is most convenient to you. 5) Check labs ASAP. 6) Follow up in 3 months. Let me know if you have any questions or concerns in the meantime. For fibromyalgia, we usually recommend treating with a combination of medications and lifestyle/activity modifications. Pain-directed medications such as gabapentin or Lyrica, antidepressant-class medications more effective for pain such as SNRI's (duloxetine or milnacripran),or TCA-type medications like Elavil (amitriptyline) which can help with headaches and sleep as well, are all first-line alternatives, and these can be sometimes used in careful combination.  For activities, search for \"rickey chi for arthritis\" for free 1-hour videos from Sanchez [facility] [place], which has been shown to be as helpful as any medications we use when done for 30 minutes per day, and can be done in conjunction with medication management. Also, if you can find a compounding pharmacy that can make you naltrexone 4.5mg daily, this has been shown to be helpful for pain in fibromyalgia as well (Arthritis Rheum. 2013 Feb;65(2):529-38). Opiate-type medications have actually been shown to associate with worse pain in the long-term, so we recommend avoiding these for fibromyalgia-related pain. As we exclusively treat inflammatory autoimmune disease from this clinic, we rely on your primary care physician (sometimes in conjunction with a Pain Management physician) to manage fibromyalgia. TODAY'S ORDERS    Orders Placed This Encounter    XR HAND RT MIN 3 V    XR HAND LT MIN 3 V    XR FOOT RT MIN 3 V    XR FOOT LT MIN 3 V    URIC ACID    C REACTIVE PROTEIN, QT    CBC WITH AUTOMATED DIFF    METABOLIC PANEL, COMPREHENSIVE    SED RATE (ESR)    CK    IMMUNOGLOBULINS, G/A/M, QT.    REFERRAL TO PHYSICAL THERAPY    allopurinoL (ZYLOPRIM) 300 mg tablet    atorvastatin (LIPITOR) 10 mg tablet    OneTouch Ultra Test strip    fexofenadine (ALLEGRA) 180 mg tablet    metoprolol succinate (TOPROL-XL) 50 mg XL tablet       Future Appointments   Date Time Provider [facility]   11/3/2022  1:40 PM Salvador Santizo MD [facility] BS AMB   11/8/2022  1:00 PM [facility] CLIN[facility] BS AMB   3/15/2023 11:40 AM Charu Esquivel, NP [address] [facility] BS AMB     Face to face time: 30 minutes  Note preparation and records review day of service: 22 minutes  Total provider time day of service: 52 minutes    This was scribed by Bessy Miller in the presence of Dr. Gene Gonzales. The note was reviewed and amended personally, and I agree with the above information.     Thomas Churchill MD    Adult Rheumatology   [facility]  A Part of [facility]], [address]   Phone [phone]  Fax [phone]

## 2022-11-03 NOTE — PATIENT INSTRUCTIONS
1) Continue to take 25mg of sub cutaneous Methotrexate once per week with daily folic acid. 2) You can take 650mg of Tylenol up to 3 times a day for joint pain. You can also use Voltaren gel as needed for hand pain. 3) Some people find Estella Chi beneficial for pain management. You can search for Khurrammai Gibson on you tube and follow along with him or try and find a class at your local [facility]      4) Continue to follow up with MELE Mehta for pain management. 5) I am going to refer you to physical therapy. You can take this referral to wherever is most convenient to you. 5) Check labs ASAP. 6) Follow up in 3 months. Let me know if you have any questions or concerns in the meantime. For fibromyalgia, we usually recommend treating with a combination of medications and lifestyle/activity modifications. Pain-directed medications such as gabapentin or Lyrica, antidepressant-class medications more effective for pain such as SNRI's (duloxetine or milnacripran),or TCA-type medications like Elavil (amitriptyline) which can help with headaches and sleep as well, are all first-line alternatives, and these can be sometimes used in careful combination. For activities, search for \"estella chi for arthritis\" for free 1-hour videos from Petros Gibson on [place], which has been shown to be as helpful as any medications we use when done for 30 minutes per day, and can be done in conjunction with medication management. Also, if you can find a compounding pharmacy that can make you naltrexone 4.5mg daily, this has been shown to be helpful for pain in fibromyalgia as well (Arthritis Rheum. 2013 Feb;65(2):529-38). Opiate-type medications have actually been shown to associate with worse pain in the long-term, so we recommend avoiding these for fibromyalgia-related pain.  As we exclusively treat inflammatory autoimmune disease from this clinic, we rely on your primary care physician (sometimes in conjunction with a Pain Management physician) to manage fibromyalgia.

## 2022-11-04 LAB
ALBUMIN SERPL-MCNC: 4.2 G/DL (ref 3.8–4.8)
ALBUMIN/GLOB SERPL: 1.2 {RATIO} (ref 1.2–2.2)
ALP SERPL-CCNC: 75 IU/L (ref 44–121)
ALT SERPL-CCNC: 10 IU/L (ref 0–32)
AST SERPL-CCNC: 12 IU/L (ref 0–40)
BASOPHILS # BLD AUTO: 0.1 X10E3/UL (ref 0–0.2)
BASOPHILS NFR BLD AUTO: 1 %
BILIRUB SERPL-MCNC: 0.4 MG/DL (ref 0–1.2)
BUN SERPL-MCNC: 5 MG/DL (ref 6–24)
BUN/CREAT SERPL: 8 (ref 9–23)
CALCIUM SERPL-MCNC: 9.6 MG/DL (ref 8.7–10.2)
CHLORIDE SERPL-SCNC: 100 MMOL/L (ref 96–106)
CK SERPL-CCNC: 85 U/L (ref 32–182)
CO2 SERPL-SCNC: 23 MMOL/L (ref 20–29)
CREAT SERPL-MCNC: 0.66 MG/DL (ref 0.57–1)
CRP SERPL-MCNC: 12 MG/L (ref 0–10)
EGFR: 111 ML/MIN/1.73
EOSINOPHIL # BLD AUTO: 0.1 X10E3/UL (ref 0–0.4)
EOSINOPHIL NFR BLD AUTO: 2 %
ERYTHROCYTE [DISTWIDTH] IN BLOOD BY AUTOMATED COUNT: 12.9 % (ref 11.7–15.4)
ERYTHROCYTE [SEDIMENTATION RATE] IN BLOOD BY WESTERGREN METHOD: 50 MM/HR (ref 0–32)
GLOBULIN SER CALC-MCNC: 3.5 G/DL (ref 1.5–4.5)
GLUCOSE SERPL-MCNC: 120 MG/DL (ref 70–99)
HCT VFR BLD AUTO: 38.6 % (ref 34–46.6)
HGB BLD-MCNC: 13 G/DL (ref 11.1–15.9)
IGA SERPL-MCNC: 347 MG/DL (ref 87–352)
IGG SERPL-MCNC: 1714 MG/DL (ref 586–1602)
IGM SERPL-MCNC: 97 MG/DL (ref 26–217)
IMM GRANULOCYTES # BLD AUTO: 0 X10E3/UL (ref 0–0.1)
IMM GRANULOCYTES NFR BLD AUTO: 0 %
LYMPHOCYTES # BLD AUTO: 3.7 X10E3/UL (ref 0.7–3.1)
LYMPHOCYTES NFR BLD AUTO: 42 %
MCH RBC QN AUTO: 30.7 PG (ref 26.6–33)
MCHC RBC AUTO-ENTMCNC: 33.7 G/DL (ref 31.5–35.7)
MCV RBC AUTO: 91 FL (ref 79–97)
MONOCYTES # BLD AUTO: 0.6 X10E3/UL (ref 0.1–0.9)
MONOCYTES NFR BLD AUTO: 7 %
NEUTROPHILS # BLD AUTO: 4.4 X10E3/UL (ref 1.4–7)
NEUTROPHILS NFR BLD AUTO: 48 %
PLATELET # BLD AUTO: 337 X10E3/UL (ref 150–450)
POTASSIUM SERPL-SCNC: 4.5 MMOL/L (ref 3.5–5.2)
PROT SERPL-MCNC: 7.7 G/DL (ref 6–8.5)
RBC # BLD AUTO: 4.23 X10E6/UL (ref 3.77–5.28)
SODIUM SERPL-SCNC: 138 MMOL/L (ref 134–144)
URATE SERPL-MCNC: 6.1 MG/DL (ref 2.6–6.2)
WBC # BLD AUTO: 8.9 X10E3/UL (ref 3.4–10.8)

## 2022-11-09 ENCOUNTER — HOSPITAL ENCOUNTER (OUTPATIENT)
Dept: GENERAL RADIOLOGY | Age: 42
Discharge: HOME | End: 2022-11-09
Payer: MEDICARE

## 2022-11-09 DIAGNOSIS — M06.09 SERONEGATIVE RHEUMATOID ARTHRITIS OF MULTIPLE SITES (HCC): ICD-10-CM

## 2022-11-09 PROCEDURE — 73630 X-RAY EXAM OF FOOT: CPT

## 2022-11-09 PROCEDURE — 73130 X-RAY EXAM OF HAND: CPT

## 2022-11-16 ENCOUNTER — OFFICE VISIT (OUTPATIENT)
Dept: NEUROLOGY | Age: 42
End: 2022-11-16
Payer: MEDICARE

## 2022-11-16 DIAGNOSIS — G56.31 RADIAL NEUROPATHY, RIGHT: ICD-10-CM

## 2022-11-16 DIAGNOSIS — G56.21 ULNAR NEUROPATHY OF RIGHT UPPER EXTREMITY: ICD-10-CM

## 2022-11-16 DIAGNOSIS — G56.11 RIGHT MEDIAN NERVE NEUROPATHY: Primary | ICD-10-CM

## 2022-11-16 PROCEDURE — 95886 MUSC TEST DONE W/N TEST COMP: CPT | Performed by: PSYCHIATRY & NEUROLOGY

## 2022-11-16 PROCEDURE — 95909 NRV CNDJ TST 5-6 STUDIES: CPT | Performed by: PSYCHIATRY & NEUROLOGY

## 2022-11-16 NOTE — PROGRESS NOTES
[facility]  [address], [address] [facility] [address]  Phone [phone] Fax [phone]  Test Date:  2022    Patient: River Thurston : 1980 Physician: Anai Hyman. Lovely Ferguson DO   Sex: Female Height: 5' 3\" Ref Phys: Anai Hyman. Lovely Ferguson DO   ID#: [id number]  Weight: 277 lbs. Technician: Figueroa Andres     Patient Complaints:  Right upper extremity paresthesias; H/O CTS release and polyneuropathy    NCV & EMG Findings:  Evaluation of the right median motor nerve showed reduced amplitude (3.4 mV) and decreased conduction velocity (Elbow-Wrist, 44 m/s). The right median sensory and the right radial sensory nerves showed no response (Wrist). The right ulnar sensory nerve showed prolonged distal peak latency (4.6 ms) and decreased conduction velocity (Wrist-5th Digit, 30 m/s). All remaining nerves  were within normal limits. Needle evaluation of the right abductor pollicis brevis and the right flexor pollicis longus muscles showed slightly increased spontaneous activity. All remaining muscles (as indicated in the following table) showed no evidence of electrical instability. Impression:  Extensive electrodiagnostic examination of the right upper extremity reveals changes most consistent with the followin. Right median and radial neuropathies, severe in degree electrically with active denervation involving median-innervated myotomes proximal and distal to the wrist. Right ulnar sensory neuropathy, mild in degree electrically. Findings are likely an extension of previously documented severe generalized polyneuropathy. 2. No evidence of a right cervical motor radiculopathy. ___________________________  Watson Ferguson DO        Nerve Conduction Studies  Anti Sensory Summary Table     Stim Site NR Peak (ms) Norm Peak (ms) P-T Amp (µV) Norm P-T Amp Onset (ms) Site1 Site2 Delta-P (ms) Dist (cm) Bjorn (m/s) Norm Bjorn (m/s)   Right Median Anti Sensory (2nd Digit)  32.7°C   Wrist NR  <3.6  >10  Wrist 2nd Digit  14.0  >39   Right Radial Anti Sensory (Base 1st Digit)  33.6°C   Wrist NR  <3.1    Wrist Base 1st Digit  0.0     Right Ulnar Anti Sensory (5th Digit)  32.8°C   Wrist    4.6 <3.7 23.0 >15.0 3.3 Wrist 5th Digit 4.6 14.0 30 >38   B Elbow    4.5  33.4  3.0 B Elbow Wrist 0.1 0.0  >47     Motor Summary Table     Stim Site NR Onset (ms) Norm Onset (ms) O-P Amp (mV) Norm O-P Amp Site1 Site2 Delta-0 (ms) Dist (cm) Bjorn (m/s) Norm Bjorn (m/s)   Right Median Motor (Abd Poll Brev)  33°C   Wrist    4.2 <4.2 3.4 >5 Elbow Wrist 5.5 24.0 44 >50   Elbow    9.7  0.4          Right Ulnar Motor (Abd Dig Minimi)  34.1°C   Wrist    3.2 <4.2 3.0 >3 B Elbow Wrist 3.8 22.0 58 >53   B Elbow    7.0  2.9  A Elbow B Elbow 1.8 10.0 56 >53   A Elbow    8.8  1.7            EMG     Side Muscle Nerve Root Ins Act Fibs Psw Amp Dur Poly Recrt Int Beacher Emperatriz Comment   Right Abd Poll Brev Median C8-T1 Nml 1+ Nml Nml Nml 0 Nml Nml    Right 1stDorInt Ulnar C8-T1 Nml Nml Nml Nml Nml 0 Nml Nml    Right Biceps Musculocut C5-6 Nml Nml Nml Nml Nml 0 Nml Nml    Right Triceps Radial C6-7-8 Nml Nml Nml Nml Nml 0 Nml Nml    Right Deltoid Axillary C5-6 Nml Nml Nml Nml Nml 0 Nml Nml    Right FlexPolLong Median (Ant Int) C7-8 Nml 1+ Nml Nml Nml 0 Nml Nml    Right Ext Indicis Radial (Post Int) C7-8 Nml Nml Nml Nml Nml 0 Nml Nml    Right PronatorTeres Median C6-7 Nml Nml Nml Nml Nml 0 Nml Nml          Waveforms:

## 2022-12-12 ENCOUNTER — PATIENT MESSAGE (OUTPATIENT)
Dept: RHEUMATOLOGY | Age: 42
End: 2022-12-12

## 2022-12-20 NOTE — TELEPHONE ENCOUNTER
John Mina LPN 47/15/7945 7:11 PM EST      ----- Message -----  From: Vandana Ortiz  Sent: 12/16/2022 11:27 AM EST  To: [facility] Nurse Pool  Subject: RE: methotrexate     Hi, this is Tab Oneal I was just checking up on the medicine Rasuvo, folic acid

## 2023-01-06 ENCOUNTER — HOSPITAL ENCOUNTER (OUTPATIENT)
Dept: PHYSICAL THERAPY | Age: 43
Discharge: HOME | End: 2023-01-06

## 2023-01-06 NOTE — PROGRESS NOTES
[facility]  [address], [address] [facility]    OUTPATIENT PHYSICAL THERAPY      1/6/2023:  Samantha Arango was not seen on this date for physical therapy for the following reasons:    [x]     Patient called to cancel the visit for the following reasons: schedule conflict   []     Patient missed the visit and did not call to cancel.     Nick Holt, PT

## 2023-01-10 ENCOUNTER — HOSPITAL ENCOUNTER (OUTPATIENT)
Dept: PHYSICAL THERAPY | Age: 43
Discharge: HOME | End: 2023-01-10
Payer: MEDICARE

## 2023-01-10 PROCEDURE — 97161 PT EVAL LOW COMPLEX 20 MIN: CPT | Performed by: PHYSICAL THERAPIST

## 2023-01-10 PROCEDURE — 97110 THERAPEUTIC EXERCISES: CPT | Performed by: PHYSICAL THERAPIST

## 2023-01-12 ENCOUNTER — OFFICE VISIT (OUTPATIENT)
Dept: NEUROLOGY | Age: 43
End: 2023-01-12
Payer: MEDICARE

## 2023-01-12 VITALS
SYSTOLIC BLOOD PRESSURE: 140 MMHG | OXYGEN SATURATION: 97 % | HEIGHT: 63 IN | WEIGHT: 270 LBS | DIASTOLIC BLOOD PRESSURE: 88 MMHG | HEART RATE: 96 BPM | BODY MASS INDEX: 47.84 KG/M2

## 2023-01-12 DIAGNOSIS — G62.9 POLYNEUROPATHY: Primary | ICD-10-CM

## 2023-01-12 DIAGNOSIS — E11.42 TYPE 2 DIABETES MELLITUS WITH DIABETIC POLYNEUROPATHY, UNSPECIFIED WHETHER LONG TERM INSULIN USE (HCC): ICD-10-CM

## 2023-01-12 PROCEDURE — 99214 OFFICE O/P EST MOD 30 MIN: CPT | Performed by: PSYCHIATRY & NEUROLOGY

## 2023-01-12 PROCEDURE — 3079F DIAST BP 80-89 MM HG: CPT | Performed by: PSYCHIATRY & NEUROLOGY

## 2023-01-12 PROCEDURE — 3077F SYST BP >= 140 MM HG: CPT | Performed by: PSYCHIATRY & NEUROLOGY

## 2023-01-12 PROCEDURE — G8510 SCR DEP NEG, NO PLAN REQD: HCPCS | Performed by: PSYCHIATRY & NEUROLOGY

## 2023-01-12 PROCEDURE — G8427 DOCREV CUR MEDS BY ELIG CLIN: HCPCS | Performed by: PSYCHIATRY & NEUROLOGY

## 2023-01-12 PROCEDURE — 2022F DILAT RTA XM EVC RTNOPTHY: CPT | Performed by: PSYCHIATRY & NEUROLOGY

## 2023-01-12 PROCEDURE — G8417 CALC BMI ABV UP PARAM F/U: HCPCS | Performed by: PSYCHIATRY & NEUROLOGY

## 2023-01-12 PROCEDURE — 3046F HEMOGLOBIN A1C LEVEL >9.0%: CPT | Performed by: PSYCHIATRY & NEUROLOGY

## 2023-01-12 NOTE — PROGRESS NOTES
Neurology Clinic Follow up Note    Patient ID:  Aishwarya Goncalves  [id number]  38 y.o.  1980      Ms. Denice Peng is here for follow up today of  Chief Complaint   Patient presents with    Other     Neuropathy Pt. Reports things are the same. Last Appointment With Me:  10/27/2022    \"Bianca Gomez is presenting for evaluation of distal extremity paresthesias. Patient reports onset of lower extremity paresthesias approximately 7 years ago describe as a burning/tingling or numbness involving both feet symmetrically not extending above the ankles. Symptoms are rather constant. Symptoms are worse when resting or in the evenings. She has noted some slight weakness into the LLE when climbing stairs with associated L knee pain. She endorses some imbalance at times. She has chronic LBP as well without significant radicular symptoms. She also reports h/o hand paresthesias s/p CTS release approximately 9 years ago initially improved with slight worsening burning/swelling, tingling into all fingers and decreased hand  b/l over the past year L>R. \"  Interval History:   Patient returns for f/u of distal extremity paresthesias. Upper extremity paresthesias are slightly improved from last visit. Distal extremity paresthesias related to her PN are stable. Controlling DM with insulin. Lyrica is helpful for neuropathic pain symptoms-managed by PCP. No reported fatigue on medication. PMHx/ PSHx/ FHx/ SHx:  Reviewed and unchanged previous visit. Past Medical History:   Diagnosis Date    Arthritis      in hands    Back pain     Diabetes ([address]     prediabetic    Gout     Hypertension     Morbid obesity ([facility] [address]     Neuropathy     Psychiatric disorder     depression    Unspecified sleep apnea          ROS:  Comprehensive review of systems negative except for as noted above.        Objective:       Meds:  Current Outpatient Medications   Medication Sig Dispense Refill    methotrexate, PF, (Rasuvo, PF,) 25 mg/0.5 mL atIn 25 mg by SubCUTAneous route every seven (7) days. 2 mL 5    folic acid (FOLVITE) 1 mg tablet Take 1 Tablet by mouth daily. 90 Tablet 5    allopurinoL (ZYLOPRIM) 300 mg tablet       atorvastatin (LIPITOR) 10 mg tablet       OneTouch Ultra Test strip       fexofenadine (ALLEGRA) 180 mg tablet       amLODIPine (NORVASC) 10 mg tablet amlodipine 10 mg tablet   TAKE 1 TABLET BY MOUTH EVERY DAY      liraglutide (Victoza 2-Jarvis) 0.6 mg/0.1 mL (18 mg/3 mL) pnij 6 mL by Injection route daily. terbinafine HCL (LAMISIL) 250 mg tablet       lidocaine 5 % topical cream Apply  to affected area two (2) times daily as needed for Itching. 15 g 0    Insulin Syringe-Needle U-100 1 mL 30 gauge x 5/16 syrg 1 Each by Does Not Apply route Every Saturday. To be used for methotrexate solution 12 Each 5    omega 3-dha-epa-fish oil (Fish Oil) 100-160-1,000 mg cap Take  by mouth.      ergocalciferol (ERGOCALCIFEROL) 1,250 mcg (50,000 unit) capsule Take 1 Cap by mouth every seven (7) days. Indications: vitamin D deficiency (high dose therapy) 12 Cap 4    SITagliptin-metFORMIN (Janumet) 50-1,000 mg per tablet Take 1 Tab by mouth daily. Lyrica 150 mg capsule 150 mg two (2) times a day. COLCRYS 0.6 mg tablet as needed. hydrochlorothiazide (HYDRODIURIL) 25 mg tablet Take 25 mg by mouth daily. esomeprazole (NEXIUM) 40 mg capsule Take 40 mg by mouth daily. buPROPion XL (WELLBUTRIN XL) 150 mg tablet Take 150 mg by mouth every morning. Indications: DEPRESSION ASSOCIATED WITH MANIC DEPRESSIVE DISORDER      metoprolol (LOPRESSOR) 50 mg tablet Take  by mouth two (2) times a day.          Exam:  Visit Vitals  BP (!) 140/88   Pulse 96   Ht 5' 3\" (1.6 m)   Wt 270 lb (122.5 kg)   LMP 04/17/2013   SpO2 97%   BMI 47.83 kg/m²     NEUROLOGICAL EXAM:  General: Awake, alert, speech fluent  CN: PERRL, EOMI without nystagmus, VFF to confrontation, facial sensation and strength are normal and symmetric, hearing is intact to finger rub bilaterally, palate and tongue movements are intact and symmetric. Motor: Normal tone, bulk and strength bilaterally. Reflexes: 2/4, plantar stimulation is flexor. Coordination: FNF, ALMA, HTS intact. Sensation: LT intact throughout. Gait: Normal-based and steady. LABS  Results for orders placed or performed in visit on 11/03/22   URIC ACID   Result Value Ref Range    Uric acid 6.1 2.6 - 6.2 mg/dL   C REACTIVE PROTEIN, QT   Result Value Ref Range    C-Reactive Protein, Qt 12 (H) 0 - 10 mg/L   CBC WITH AUTOMATED DIFF   Result Value Ref Range    WBC 8.9 3.4 - 10.8 x10E3/uL    RBC 4.23 3.77 - 5.28 x10E6/uL    HGB 13.0 11.1 - 15.9 g/dL    HCT 38.6 34.0 - 46.6 %    MCV 91 79 - 97 fL    MCH 30.7 26.6 - 33.0 pg    MCHC 33.7 31.5 - 35.7 g/dL    RDW 12.9 11.7 - 15.4 %    PLATELET 852 143 - 258 x10E3/uL    NEUTROPHILS 48 Not Estab. %    Lymphocytes 42 Not Estab. %    MONOCYTES 7 Not Estab. %    EOSINOPHILS 2 Not Estab. %    BASOPHILS 1 Not Estab. %    ABS. NEUTROPHILS 4.4 1.4 - 7.0 x10E3/uL    Abs Lymphocytes 3.7 (H) 0.7 - 3.1 x10E3/uL    ABS. MONOCYTES 0.6 0.1 - 0.9 x10E3/uL    ABS. EOSINOPHILS 0.1 0.0 - 0.4 x10E3/uL    ABS. BASOPHILS 0.1 0.0 - 0.2 x10E3/uL    IMMATURE GRANULOCYTES 0 Not Estab. %    ABS. IMM. GRANS. 0.0 0.0 - 0.1 [id number]   METABOLIC PANEL, COMPREHENSIVE   Result Value Ref Range    Glucose 120 (H) 70 - 99 mg/dL    BUN 5 (L) 6 - 24 mg/dL    Creatinine 0.66 0.57 - 1.00 mg/dL    eGFR 111 >59 mL/min/1.73    BUN/Creatinine ratio 8 (L) 9 - 23    Sodium 138 134 - 144 mmol/L    Potassium 4.5 3.5 - 5.2 mmol/L    Chloride 100 96 - 106 mmol/L    CO2 23 20 - 29 mmol/L    Calcium 9.6 8.7 - 10.2 mg/dL    Protein, total 7.7 6.0 - 8.5 g/dL    Albumin 4.2 3.8 - 4.8 g/dL    GLOBULIN, TOTAL 3.5 1.5 - 4.5 g/dL    A-G Ratio 1.2 1.2 - 2.2    Bilirubin, total 0.4 0.0 - 1.2 mg/dL    Alk.  phosphatase 75 44 - 121 IU/L    AST (SGOT) 12 0 - 40 IU/L    ALT (SGPT) 10 0 - 32 IU/L   SED RATE (ESR)   Result Value Ref Range Sed rate (ESR) 50 (H) 0 - 32 mm/hr   CK   Result Value Ref Range    Creatine Kinase,Total 85 32 - 182 U/L   IMMUNOGLOBULINS, G/A/M, QT. Result Value Ref Range    Immunoglobulin G, Qt. 1,714 (H) 586 - 1,602 mg/dL    Immunoglobulin A, Qt. 347 87 - 352 mg/dL    Immunoglobulin M, Qt. 97 26 - 217 mg/dL       IMAGING:  MRI Results (most recent):  No results found for this or any previous visit. EMG:  Extensive electrodiagnostic examination of the left upper and lower extremities reveals the followin. A generalized sensorimotor length-dependent polyneuropathy affecting the lower and upper extremities, severe in degree electrically with ongoing/active denervation. 2. A left median neuropathy at or distal to the wrist, consistent with a clinical diagnosis of carpal tunnel syndrome, mild in degree electrically. 3. A left ulnar sensory neuropathy, severe in degree electrically and likely related to #1 above. 4. No evidence of a left cervical or lumbosacral motor radiculopathy. EM22  Impression:  Extensive electrodiagnostic examination of the right upper extremity reveals changes most consistent with the followin. Right median and radial neuropathies, severe in degree electrically with active denervation involving median-innervated myotomes proximal and distal to the wrist. Right ulnar sensory neuropathy, mild in degree electrically. Findings are likely an extension of previously documented severe generalized polyneuropathy. 2. No evidence of a right cervical motor radiculopathy. Assessment:     Encounter Diagnoses     ICD-10-CM ICD-9-CM   1. Polyneuropathy  G62.9 356.9   2.  Type 2 diabetes mellitus with diabetic polyneuropathy, unspecified whether long term insulin use (HCC)  E11.42 250.60     28.2     38year old pleasant AAF with a h/o seronegative RA, DM, b/l CTS s/p release previously seen for progressive distal lower and upper extremity paresthesias for several years. Prior EMG completed as outlined above with findings of a severe generalized sensorimotor polyneuropathy, mild L CTS and L ulnar sensory neuropathy. I suspect her polyneuropathy is secondary to diabetes. Worsening upper extremity paresthesias s/p CTS release is most likely explained by superimposed polyneuropathy now extending to her hands b/l as confirmed on recent repeat electrodiagnostic testing. She will continue Lyrica to assist with neuropathic pain symptoms. This is currently being managed by her PCP. Reviewed importance of strict glucose control to prevent further progression of her polyneuropathy. Plan:   Continue Lyrica for neuropathic pain symptoms  Discussed strict control of glucose to prevent progression of polyneuropathy      Follow-up and Dispositions    Return if symptoms worsen or fail to improve. I have discussed the diagnosis with the patient today and the intended plan as seen in the above orders with both the patient as well as referring provider and/or PCP via electronic correspondence. The patient has received an after-visit summary and questions were answered concerning future plans. I have discussed medication side effects and warnings with the patient as well.       Signed:  eTssy Ma DO  1/12/2023

## 2023-01-13 ENCOUNTER — HOSPITAL ENCOUNTER (OUTPATIENT)
Dept: PHYSICAL THERAPY | Age: 43
End: 2023-01-13
Payer: MEDICARE

## 2023-01-13 NOTE — PROGRESS NOTES
[facility]  [address], [address] [facility]    OUTPATIENT PHYSICAL THERAPY      1/13/2023:  Aman Hines was not seen on this date for physical therapy for the following reasons:    [x]     Patient called to cancel the visit for the following reasons: schedule conflict  []     Patient missed the visit and did not call to cancel.     Magnolia De Jesus , PTA

## 2023-01-17 ENCOUNTER — HOSPITAL ENCOUNTER (OUTPATIENT)
Dept: PHYSICAL THERAPY | Age: 43
Discharge: HOME | End: 2023-01-17
Payer: MEDICARE

## 2023-01-17 PROCEDURE — 97110 THERAPEUTIC EXERCISES: CPT

## 2023-01-17 PROCEDURE — 97140 MANUAL THERAPY 1/> REGIONS: CPT

## 2023-01-17 NOTE — PROGRESS NOTES
[facility], [address] [facility]    OUTPATIENT PHYSICAL THERAPY DAILY TREATMENT NOTE  VISIT: 2    NAME: Opal Pino : 1980 AGE: 38 y.o. GENDER: female  DATE: 2023  REFERRING PHYSICIAN: Nathan Escalona MD      GOALS  Short term goals  Time frame: 4 weeks  1. Patient will be compliant and independent with the initial HEP as evidenced by being able to perform without cuing. 2. Patient will report a 25% improvement in symptoms. 3. Patient report a 25% improvement in sleeping. 4. Patient will have an increased tolerance for standing to allow 20 minutes of the activity before symptoms start. 5. Patient will tolerate 20 minutes of clinic activities before increase of symptoms. Long term goals  Time frame: 8 weeks  1. Patient will report pain level decrease to 3/10 to allow increased ease of movement. 2. Patient will have an improved score on the LEFS outcome measure by 9 points to demonstrate an increase in functional activity tolerance. 3. Patient will be independent in final individualized HEP. 4. Patient will have an increase in bilateral hip flexion strength to 5/5 to allow performance of household tasks. 5. Patient will return to walking without being limited by symptoms. 6. Patient will sleep 6-8 hours without being interrupted by pain. SUBJECTIVE:   \"It's hurting today. \" Pt reports increased pain today in right sided low back and hip. Pt reports hands are feeling better today.   Pain In: 6-7/10    OBJECTIVE DATA SUMMARY:       EXAMINATION/PRESENTATION/DECISION MAKING:   Pain:  Location: Low back, BLEs, and B hands  Quality: aching, throbbing, stiffness  Now: 5/10  Best: 4/10  Worst: 8/10  Factors that improve pain: medication: used and beneficial     Posture:   Shifted to right side in standing     Strength:                                          LEFT                  RIGHT  Hip flexion                       4+/5 4/5; pain in low back (R)  Hip abduction                 5/5                     4/5; pain in low back (R)  Hip extension                  5/5                     5/5; mild pain at low back (R)  Knee flexion                    5/5                     5/5  Knee extension               5/5                     5/5; pain at anterior right knee       Ankle DF                         5/5                     5/5  Ankle PF                         5/5                     5/5      Range of Motion:   Lumbar Spine (AROM)  (*Measured 3rd finger from the floor)  Flexion*                          8\"; pain in low back   Extension           75% limited; sharp pain at right side of low back  R side bend       50% Limited; aching pain on right   L side bend        25% Limited; no pain  R rotation           25% Limited; no pain  L rotation           25% Limited; mild pain on right side of back       Joint Mobility:   Tender with CPA of lower thoracic and lumbar spine in prone      Palpation:   Tender to palpation at B QL, gluteals, lateral hips, thoracic/lumbar paraspinals      Neurologic Assessment:               Tone: Normal               Sensation: Intact to light touch; tingling/numbness down BLEs and B hands               Reflexes: NT     Special Tests:   (+) SLR, right     Mobility:               Transitional Movements: Increased time and effort to perform bed mobility and transfers                Gait: Slow pace  Balance:  WNL     Functional Measure:   LEFS: 34/80     Based on the above components, the patient evaluation is determined to be of the following complexity level: LOW      TREATMENT/INTERVENTION:  Interventions in BOLD performed this date:      Modalities (Rationale): MHP to low back in supine for 10 minutes at end of session; no skin irritation noted     Therapeutic Exercises: to develop strength, endurance, range of motion, and flexibility  Initial HEP provided 1/10/2023 (Access Code [email or web address]): sidelying open book stretch; LTR; pelvic tilts; hookyling marches     Supine:   LTR: 5 reps B  Pelvic tilts: 10 reps  Hooklying marches: 5 reps B      Sidelying:   Open book stretch: 10 reps B     Seated:  Fwd/Lat flex with SWB: 5 reps each  Mod. QL stretch: 3 reps each, 10 second hold     Manual Therapy: for joint mobilization/manipulations and soft tissue mobilization  STM R glute med, piriformis, and lumbar paraspinals     Neuro Re-Education: to improve movement, balance, coordination, kinesthetic sense, posture, and proprioception for sitting or standing balance  None this date     Therapeutic Activities: to improve functional performance, power, or agility  None this date     Ambulation/Gait Training:  None this date       Activity tolerance and post treatment pain report:   Good  Pain Out: \"much better\"    Education:  Education was provided to the patient on the following topics: stretches, hot pack  []    No changes were made to the home exercise program.  [x]    The following changes were made to the home exercise program: updated: added forward flex with SWB and QL stretch  Patient verbalized understanding of the topics presented. ASSESSMENT:   Patient returns from initial follow up with continued right sided lower back and hip pain. Pt with increased discomfort today due to weather according to patient. Pt experienced increased pain in right LE and lower back with supine marches and exercise held today. Pt with improved symptoms following stretches and manual therapy techniques. Updated HEP with good understanding from patient. Pt will benefit from continued physical therapy services to decrease pain, improve ROM, and increase strength.     Patients progression toward goals is as follows:  [x]     Improving appropriately and progressing toward goals  []     Improving slowly and progressing toward goals  []     Not making progress toward goals and plan of care will be adjusted    PLAN OF CARE:   Patient continues to benefit from skilled intervention to address the above impairments. [x]    Continue treatment per established plan of care.   []     Recommend the following changes to the plan of care:     Recommendations/Intent for next treatment: progress as able    Bridger Potter PTA   Time Calculation: 34 mins  Patient Time in clinic:   Start Time: 1436   Stop Time: [phone]

## 2023-01-19 ENCOUNTER — HOSPITAL ENCOUNTER (OUTPATIENT)
Dept: PHYSICAL THERAPY | Age: 43
Discharge: HOME | End: 2023-01-19
Payer: MEDICARE

## 2023-01-19 PROCEDURE — 97110 THERAPEUTIC EXERCISES: CPT | Performed by: PHYSICAL THERAPIST

## 2023-01-19 PROCEDURE — 97140 MANUAL THERAPY 1/> REGIONS: CPT | Performed by: PHYSICAL THERAPIST

## 2023-01-19 NOTE — PROGRESS NOTES
[address]    OUTPATIENT PHYSICAL THERAPY DAILY TREATMENT NOTE  VISIT: 3    NAME: Shonda Orlando : 1980 AGE: 38 y.o. GENDER: female  DATE: 2023  REFERRING PHYSICIAN: Navin Mcdonnell MD    GOALS  Short term goals  Time frame: 4 weeks  1. Patient will be compliant and independent with the initial HEP as evidenced by being able to perform without cuing. 2. Patient will report a 25% improvement in symptoms. 3. Patient report a 25% improvement in sleeping. 4. Patient will have an increased tolerance for standing to allow 20 minutes of the activity before symptoms start. 5. Patient will tolerate 20 minutes of clinic activities before increase of symptoms. Long term goals  Time frame: 8 weeks  1. Patient will report pain level decrease to 3/10 to allow increased ease of movement. 2. Patient will have an improved score on the LEFS outcome measure by 9 points to demonstrate an increase in functional activity tolerance. 3. Patient will be independent in final individualized HEP. 4. Patient will have an increase in bilateral hip flexion strength to 5/5 to allow performance of household tasks. 5. Patient will return to walking without being limited by symptoms. 6. Patient will sleep 6-8 hours without being interrupted by pain. SUBJECTIVE:   \"I feel a little better. \"    Pain In: 4/10 low back and BLEs    OBJECTIVE DATA SUMMARY:   Objective data from initial evaluation:  EXAMINATION/PRESENTATION/DECISION MAKING:   Pain:  Location: Low back, BLEs, and B hands  Quality: aching, throbbing, stiffness  Now: 5/10  Best: 4/10  Worst: 8/10  Factors that improve pain: medication: used and beneficial     Posture:   Shifted to right side in standing     Strength:                                          LEFT                  RIGHT  Hip flexion                       4+/5                   4/5; pain in low back (R)  Hip abduction                 5/5                     4/5; pain in low back (R)  Hip extension                  5/5                     5/5; mild pain at low back (R)  Knee flexion                    5/5                     5/5  Knee extension               5/5                     5/5; pain at anterior right knee       Ankle DF                         5/5                     5/5  Ankle PF                         5/5                     5/5      Range of Motion:   Lumbar Spine (AROM)  (*Measured 3rd finger from the floor)  Flexion*              8\"; pain in low back   Extension           75% limited; sharp pain at right side of low back  R side bend       50% Limited; aching pain on right   L side bend        25% Limited; no pain  R rotation           25% Limited; no pain  L rotation           25% Limited; mild pain on right side of back       Joint Mobility:   Tender with CPA of lower thoracic and lumbar spine in prone      Palpation:   Tender to palpation at B QL, gluteals, lateral hips, thoracic/lumbar paraspinals      Neurologic Assessment:               Tone: Normal               Sensation: Intact to light touch; tingling/numbness down BLEs and B hands               Reflexes: NT     Special Tests:   (+) SLR, right     Mobility:               Transitional Movements: Increased time and effort to perform bed mobility and transfers                Gait: Slow pace  Balance:  WNL     Functional Measure:   LEFS: 34/80     TREATMENT/INTERVENTION:  Interventions in BOLD performed this date:      Modalities (Rationale): to decrease pain and muscle guarding  MHP to low back in supine for 10 minutes at end of session; no skin irritation noted     Therapeutic Exercises: to develop strength, endurance, range of motion, and flexibility  Initial HEP provided 1/10/2023 (Access Code [email or web address]): sidelying open book stretch; LTR; pelvic tilts; hookyling marches  Added to HEP 1/17/23: forward/lateral trunk flex with SWB, modified QL stretch    Supine:   LTR: 5 reps B  Pelvic tilts: 10 reps  Hooklying marches: 5 reps B   Quadriceps stretch (one leg off table): 3 reps with 10 sec holds B      Sidelying:   Open book stretch: 10 reps B      Seated:  Fwd/Lateral trunk flex with blue physioball: 5 reps each  Mod. QL stretch: 3 reps each, 10 second hold     Manual Therapy: for joint mobilization/manipulations and soft tissue mobilization  STM R glute med, piriformis, and lumbar paraspinals with lacrosse ball      Neuro Re-Education: to improve movement, balance, coordination, kinesthetic sense, posture, and proprioception for sitting or standing balance  None this date     Therapeutic Activities: to improve functional performance, power, or agility  None this date     Activity tolerance and post treatment pain report:   Good; 3/10    Education:  Education was provided to the patient on the following topics: continue to perform HEP in pain free ROM. [x]    No changes were made to the home exercise program.  []    The following changes were made to the home exercise program  Patient verbalized understanding of the topics presented. ASSESSMENT:   Patient presents with decreased right sided low back pain and continued aching pain in B thighs and B hands. Patient reports tightness and tenderness at B thighs. Pain in B hands irritated with the weather per patient. Patient has been performing HEP regularly. Patient tolerated clinic exercises well with cues for form provided. Pain relief following manual therapy. Patient will continue to benefit from skilled physical therapy to improve pain and mobility.      Patients progression toward goals is as follows:  [x]     Improving appropriately and progressing toward goals  []     Improving slowly and progressing toward goals  []     Not making progress toward goals and plan of care will be adjusted    PLAN OF CARE:   Patient continues to benefit from skilled intervention to address the above impairments. [x]    Continue treatment per established plan of care.   []     Recommend the following changes to the plan of care    Recommendations/Intent for next treatment: progress as able    Javon Edmond, PT   Time Calculation: 37 mins  Patient Time in clinic:   Start Time: 1358   Stop Time: [phone]

## 2023-01-24 ENCOUNTER — HOSPITAL ENCOUNTER (OUTPATIENT)
Dept: PHYSICAL THERAPY | Age: 43
Discharge: HOME | End: 2023-01-24
Payer: MEDICARE

## 2023-01-24 PROCEDURE — 97110 THERAPEUTIC EXERCISES: CPT

## 2023-01-24 PROCEDURE — 97140 MANUAL THERAPY 1/> REGIONS: CPT

## 2023-01-24 NOTE — PROGRESS NOTES
[facility], [address] [facility]    OUTPATIENT PHYSICAL THERAPY DAILY TREATMENT NOTE  VISIT: 4    NAME: Patel Pugh : 1980 AGE: 38 y.o. GENDER: female  DATE: 2023  REFERRING PHYSICIAN: Meli Jolly MD    GOALS  Short term goals  Time frame: 4 weeks  1. Patient will be compliant and independent with the initial HEP as evidenced by being able to perform without cuing. 2. Patient will report a 25% improvement in symptoms. 3. Patient report a 25% improvement in sleeping. 4. Patient will have an increased tolerance for standing to allow 20 minutes of the activity before symptoms start. 5. Patient will tolerate 20 minutes of clinic activities before increase of symptoms. Long term goals  Time frame: 8 weeks  1. Patient will report pain level decrease to 3/10 to allow increased ease of movement. 2. Patient will have an improved score on the LEFS outcome measure by 9 points to demonstrate an increase in functional activity tolerance. 3. Patient will be independent in final individualized HEP. 4. Patient will have an increase in bilateral hip flexion strength to 5/5 to allow performance of household tasks. 5. Patient will return to walking without being limited by symptoms. 6. Patient will sleep 6-8 hours without being interrupted by pain. SUBJECTIVE:   \"I feel fine. \"    Pain In: 3/10 low back     OBJECTIVE DATA SUMMARY:   Objective data from initial evaluation:  EXAMINATION/PRESENTATION/DECISION MAKING:   Pain:  Location: Low back, BLEs, and B hands  Quality: aching, throbbing, stiffness  Now: 5/10  Best: 4/10  Worst: 8/10  Factors that improve pain: medication: used and beneficial     Posture:   Shifted to right side in standing     Strength:                                          LEFT                  RIGHT  Hip flexion                       4+/5                   4/5; pain in low back (R)  Hip abduction 5/5 4/5; pain in low back (R)  Hip extension                  5/5 5/5; mild pain at low back (R)  Knee flexion                    5/5                     5/5  Knee extension               5/5 5/5; pain at anterior right knee       Ankle DF                         5/5 5/5  Ankle PF                         5/5                     5/5      Range of Motion:   Lumbar Spine (AROM)  (*Measured 3rd finger from the floor)  Flexion*              8\"; pain in low back   Extension           75% limited; sharp pain at right side of low back  R side bend       50% Limited; aching pain on right   L side bend        25% Limited; no pain  R rotation           25% Limited; no pain  L rotation           25% Limited; mild pain on right side of back       Joint Mobility:   Tender with CPA of lower thoracic and lumbar spine in prone      Palpation:   Tender to palpation at B QL, gluteals, lateral hips, thoracic/lumbar paraspinals      Neurologic Assessment:               Tone: Normal               Sensation: Intact to light touch; tingling/numbness down BLEs and B hands               Reflexes: NT     Special Tests:   (+) SLR, right     Mobility:               Transitional Movements: Increased time and effort to perform bed mobility and transfers                Gait: Slow pace  Balance:  WNL     Functional Measure:   LEFS: 34/80     TREATMENT/INTERVENTION:  Interventions in BOLD performed this date:      Modalities (Rationale): to decrease pain and muscle guarding  MHP to low back in supine for 10 minutes at end of session; no skin irritation noted     Therapeutic Exercises: to develop strength, endurance, range of motion, and flexibility  Initial HEP provided 1/10/2023 (Access Code [email or web address]): sidelying open book stretch; LTR; pelvic tilts; hookyling marches  Added to HEP 1/17/23: forward/lateral trunk flex with SWB, modified QL stretch    Supine:   LTR: 10 reps B  Pelvic tilts: 10 reps  Hooklying marches: 5 reps B   Quadriceps stretch (one leg off table): 3 reps with 10 sec holds B   Hip ER with red TB: 10 reps     Sidelying:   Open book stretch: 10 reps B      Seated:  Fwd/Lateral trunk flex with blue physioball: 5 reps each  Mod. QL stretch: 3 reps each, 10 second hold     Manual Therapy: for joint mobilization/manipulations and soft tissue mobilization  STM R glute med, piriformis, and lumbar paraspinals with lacrosse ball      Neuro Re-Education: to improve movement, balance, coordination, kinesthetic sense, posture, and proprioception for sitting or standing balance  None this date     Therapeutic Activities: to improve functional performance, power, or agility  None this date     Activity tolerance and post treatment pain report:   Good; \"better\"    Education:  Education was provided to the patient on the following topics: continue to perform HEP in pain free ROM. [x]    No changes were made to the home exercise program.  []    The following changes were made to the home exercise program  Patient verbalized understanding of the topics presented. ASSESSMENT:   Patient presents with decreased right sided low back pain. No complaints of previous aching in anterior thighs and hands. Patient with improved core control with PPT and supine hip ER. Less tight and tender in right hip musculature this visit. Reviewed HEP and demonstrated good understanding. Improved symptoms following hot pack to lower back. Patient will continue to benefit from skilled physical therapy to improve pain and mobility. Patients progression toward goals is as follows:  [x]     Improving appropriately and progressing toward goals  []     Improving slowly and progressing toward goals  []     Not making progress toward goals and plan of care will be adjusted    PLAN OF CARE:   Patient continues to benefit from skilled intervention to address the above impairments.     [x]    Continue treatment per established plan of care.   []     Recommend the following changes to the plan of care    Recommendations/Intent for next treatment: progress as able    Danika Terry PTA   Time Calculation: 39 mins  Patient Time in clinic:   Start Time: 3075   Stop Time: 1500

## 2023-01-26 ENCOUNTER — HOSPITAL ENCOUNTER (OUTPATIENT)
Dept: PHYSICAL THERAPY | Age: 43
End: 2023-01-26
Payer: MEDICARE

## 2023-01-26 NOTE — PROGRESS NOTES
[facility]  [address] [facility]    OUTPATIENT PHYSICAL THERAPY      1/26/2023:  [place] Brooks was not seen on this date for physical therapy for the following reasons:    [x]     Patient called to cancel the visit for the following reasons: schedule conflict  []     Patient missed the visit and did not call to cancel.     Erlinda Borja, PT

## 2023-01-31 ENCOUNTER — HOSPITAL ENCOUNTER (OUTPATIENT)
Dept: PHYSICAL THERAPY | Age: 43
Discharge: HOME | End: 2023-01-31
Payer: MEDICARE

## 2023-01-31 PROCEDURE — 97110 THERAPEUTIC EXERCISES: CPT

## 2023-01-31 PROCEDURE — 97140 MANUAL THERAPY 1/> REGIONS: CPT

## 2023-01-31 NOTE — PROGRESS NOTES
[facility], [address] [facility]    OUTPATIENT PHYSICAL THERAPY DAILY TREATMENT NOTE  VISIT: 5    NAME: Shannan Mckinney : 1980 AGE: 38 y.o. GENDER: female  DATE: 2023  REFERRING PHYSICIAN: Gerhardt Jerry, MD    GOALS  Short term goals  Time frame: 4 weeks  1. Patient will be compliant and independent with the initial HEP as evidenced by being able to perform without cuing. 2. Patient will report a 25% improvement in symptoms. MET  3. Patient report a 25% improvement in sleeping. 4. Patient will have an increased tolerance for standing to allow 20 minutes of the activity before symptoms start. MET  5. Patient will tolerate 20 minutes of clinic activities before increase of symptoms. MET     Long term goals  Time frame: 8 weeks  1. Patient will report pain level decrease to 3/10 to allow increased ease of movement. 2. Patient will have an improved score on the LEFS outcome measure by 9 points to demonstrate an increase in functional activity tolerance. 3. Patient will be independent in final individualized HEP. 4. Patient will have an increase in bilateral hip flexion strength to 5/5 to allow performance of household tasks. 5. Patient will return to walking without being limited by symptoms. 6. Patient will sleep 6-8 hours without being interrupted by pain. SUBJECTIVE:   \"I don't have any pain today. \"    Pain In: 0/10 low back      OBJECTIVE DATA SUMMARY:   Objective data from initial evaluation:  EXAMINATION/PRESENTATION/DECISION MAKING:   Pain:  Location: Low back, BLEs, and B hands  Quality: aching, throbbing, stiffness  Now: 5/10  Best: 4/10  Worst: 8/10  Factors that improve pain: medication: used and beneficial     Posture:   Shifted to right side in standing     Strength:                                          LEFT                  RIGHT  Hip flexion                       4+/5                   4/5; pain in low back (R)  Hip abduction                 5/5                     4/5; pain in low back (R)  Hip extension                  5/5                     5/5; mild pain at low back (R)  Knee flexion                    5/5                     5/5  Knee extension               5/5                     5/5; pain at anterior right knee       Ankle DF                         5/5                     5/5  Ankle PF                         5/5                     5/5      Range of Motion:   Lumbar Spine (AROM)  (*Measured 3rd finger from the floor)  Flexion*              8\"; pain in low back   Extension           75% limited; sharp pain at right side of low back  R side bend       50% Limited; aching pain on right   L side bend        25% Limited; no pain  R rotation           25% Limited; no pain  L rotation           25% Limited; mild pain on right side of back       Joint Mobility:   Tender with CPA of lower thoracic and lumbar spine in prone      Palpation:   Tender to palpation at B QL, gluteals, lateral hips, thoracic/lumbar paraspinals      Neurologic Assessment:               Tone: Normal               Sensation: Intact to light touch; tingling/numbness down BLEs and B hands               Reflexes: NT     Special Tests:   (+) SLR, right     Mobility:               Transitional Movements: Increased time and effort to perform bed mobility and transfers                Gait: Slow pace  Balance:  WNL     Functional Measure:   LEFS: 34/80     TREATMENT/INTERVENTION:  Interventions in BOLD performed this date:      Modalities (Rationale): to decrease pain and muscle guarding  MHP to low back in supine for 10 minutes at end of session; no skin irritation noted     Therapeutic Exercises: to develop strength, endurance, range of motion, and flexibility  Initial HEP provided 1/10/2023 (Access Code [email or web address]): sidelying open book stretch; LTR; pelvic tilts; hookyling marches  Added to HEP 1/17/23: forward/lateral trunk flex with SWB, modified QL stretch    Supine:   LTR: 10 reps B  Pelvic tilts: 10 reps  Hooklying marches: 5 reps B   Quadriceps stretch (one leg off table): 3 reps with 10 sec holds B   Hip ER with red TB: 10 reps  Ball squeeze with PPT: 15 reps     Sidelying:   Open book stretch: 10 reps B      Seated:  Fwd/Lateral trunk flex with blue physioball: 5 reps each  Mod. QL stretch: 3 reps each, 10 second hold  Shoulder Ext with ppt, red TB: 10 reps    Standing:  Hip abduction: 10 reps each     Manual Therapy: for joint mobilization/manipulations and soft tissue mobilization  STM R glute med, piriformis, and lumbar paraspinals with lacrosse ball      Neuro Re-Education: to improve movement, balance, coordination, kinesthetic sense, posture, and proprioception for sitting or standing balance  None this date     Therapeutic Activities: to improve functional performance, power, or agility  None this date     Activity tolerance and post treatment pain report:   Good; \"better\"    Education:  Education was provided to the patient on the following topics: continue to perform HEP in pain free ROM. [x]    No changes were made to the home exercise program.  []    The following changes were made to the home exercise program  Patient verbalized understanding of the topics presented. ASSESSMENT:   Patient presents with diminished low back pain this visit. Patient with improved activity tolerance, ROM, and core control since initial visit. Patient able to stand and walk around for about 2 hours until she experiences increased symptoms. Progressed core and LE strengthening without increased symptoms and pt demonstrating good form throughout. Patient with continued soft tissue restrictions in hip musculature that improved with manual techniques. Patient will continue to benefit from skilled physical therapy to improve pain and mobility.      Patients progression toward goals is as follows:  [x]     Improving appropriately and progressing toward goals  [] Improving slowly and progressing toward goals  []     Not making progress toward goals and plan of care will be adjusted    PLAN OF CARE:   Patient continues to benefit from skilled intervention to address the above impairments. [x]    Continue treatment per established plan of care.   []     Recommend the following changes to the plan of care    Recommendations/Intent for next treatment: progress as able    Cris Lawrence PTA   Time Calculation: 45 mins  Patient Time in clinic:   Start Time: 1431   Stop Time: [phone]

## 2023-02-07 ENCOUNTER — HOSPITAL ENCOUNTER (OUTPATIENT)
Dept: PHYSICAL THERAPY | Age: 43
Discharge: HOME | End: 2023-02-07
Payer: MEDICARE

## 2023-02-07 PROCEDURE — 97110 THERAPEUTIC EXERCISES: CPT

## 2023-02-07 PROCEDURE — 97140 MANUAL THERAPY 1/> REGIONS: CPT

## 2023-02-07 NOTE — PROGRESS NOTES
[facility], [address] [facility]    OUTPATIENT PHYSICAL THERAPY DAILY TREATMENT NOTE  VISIT: 6    NAME: Deepali Sainz : 1980 AGE: 38 y.o. GENDER: female  DATE: 2023  REFERRING PHYSICIAN: Abida Guillen MD    GOALS  Short term goals  Time frame: 4 weeks  1. Patient will be compliant and independent with the initial HEP as evidenced by being able to perform without cuing. 2. Patient will report a 25% improvement in symptoms. MET  3. Patient report a 25% improvement in sleeping. 4. Patient will have an increased tolerance for standing to allow 20 minutes of the activity before symptoms start. MET  5. Patient will tolerate 20 minutes of clinic activities before increase of symptoms. MET     Long term goals  Time frame: 8 weeks  1. Patient will report pain level decrease to 3/10 to allow increased ease of movement. MET  2. Patient will have an improved score on the LEFS outcome measure by 9 points to demonstrate an increase in functional activity tolerance. 3. Patient will be independent in final individualized HEP. 4. Patient will have an increase in bilateral hip flexion strength to 5/5 to allow performance of household tasks. 5. Patient will return to walking without being limited by symptoms. progressing  6. Patient will sleep 6-8 hours without being interrupted by pain. SUBJECTIVE:   \"I'm doing pretty well. \"    Pain In: 0/10 low back      OBJECTIVE DATA SUMMARY:   Objective data from initial evaluation:  EXAMINATION/PRESENTATION/DECISION MAKING:   Pain:  Location: Low back, BLEs, and B hands  Quality: aching, throbbing, stiffness  Now: 5/10  Best: 4/10  Worst: 8/10  Factors that improve pain: medication: used and beneficial     Posture:   Shifted to right side in standing     Strength:                                          LEFT                  RIGHT  Hip flexion                       4+/5                   4/5; pain in low back (R)  Hip abduction                 5/5                     4/5; pain in low back (R)  Hip extension                  5/5                     5/5; mild pain at low back (R)  Knee flexion                    5/5                     5/5  Knee extension               5/5                     5/5; pain at anterior right knee       Ankle DF                         5/5                     5/5  Ankle PF                         5/5                     5/5      Range of Motion:   Lumbar Spine (AROM)  (*Measured 3rd finger from the floor)  Flexion*              8\"; pain in low back   Extension           75% limited; sharp pain at right side of low back  R side bend       50% Limited; aching pain on right   L side bend        25% Limited; no pain  R rotation           25% Limited; no pain  L rotation           25% Limited; mild pain on right side of back       Joint Mobility:   Tender with CPA of lower thoracic and lumbar spine in prone      Palpation:   Tender to palpation at B QL, gluteals, lateral hips, thoracic/lumbar paraspinals      Neurologic Assessment:               Tone: Normal               Sensation: Intact to light touch; tingling/numbness down BLEs and B hands               Reflexes: NT     Special Tests:   (+) SLR, right     Mobility:               Transitional Movements: Increased time and effort to perform bed mobility and transfers                Gait: Slow pace  Balance:  WNL     Functional Measure:   LEFS: 34/80     TREATMENT/INTERVENTION:  Interventions in BOLD performed this date:      Modalities (Rationale): to decrease pain and muscle guarding  MHP to low back in supine for 10 minutes at end of session; no skin irritation noted     Therapeutic Exercises: to develop strength, endurance, range of motion, and flexibility  Initial HEP provided 1/10/2023 (Access Code [email or web address]): sidelying open book stretch; LTR; pelvic tilts; hookyling marches  Added to HEP 1/17/23: forward/lateral trunk flex with SWB, modified QL stretch    Supine:   LTR: 10 reps B  Pelvic tilts: 10 reps  Hooklying marches: 5 reps B   Quadriceps stretch (one leg off table): 3 reps with 10 sec holds B   Hip ER with red TB: 10 reps  Ball squeeze with PPT: 15 reps     Sidelying:   Open book stretch: 10 reps B      Seated:  Fwd/Lateral trunk flex with blue physioball: 5 reps each  Mod. QL stretch: 3 reps each, 10 second hold  Shoulder Ext with ppt, red TB: 10 reps    Standing:  Hip abduction: 10 reps each  Marches: 5 reps each     Manual Therapy: for joint mobilization/manipulations and soft tissue mobilization  STM R glute med, piriformis, and lumbar paraspinals with lacrosse ball      Neuro Re-Education: to improve movement, balance, coordination, kinesthetic sense, posture, and proprioception for sitting or standing balance  None this date     Therapeutic Activities: to improve functional performance, power, or agility  None this date     Activity tolerance and post treatment pain report:   Good; \"better\"    Education:  Education was provided to the patient on the following topics: continue to perform HEP in pain free ROM. [x]    No changes were made to the home exercise program.  []    The following changes were made to the home exercise program  Patient verbalized understanding of the topics presented. ASSESSMENT:   Patient continues to demonstrate decreased low back pain. Continues to improve with activity tolerance, ROM and core control. Tolerated gentle progression in strengthening exercises without increased pain. Updated HEP with pt demonstrating good understanding. Patient with continued soft tissue restrictions in hip musculature that improved with manual techniques. Discussed possible discharge soon if symptoms continue to improve and patient progresses toward long term goals. Patient will continue to benefit from skilled physical therapy to improve pain and mobility.      Patients progression toward goals is as follows:  [x] Improving appropriately and progressing toward goals  []     Improving slowly and progressing toward goals  []     Not making progress toward goals and plan of care will be adjusted    PLAN OF CARE:   Patient continues to benefit from skilled intervention to address the above impairments. [x]    Continue treatment per established plan of care.   []     Recommend the following changes to the plan of care    Recommendations/Intent for next treatment: progress as able    Yi Cage, PTA   Time Calculation: 49 mins  Patient Time in clinic:   Start Time: 1431   Stop Time: 1520

## 2023-02-09 ENCOUNTER — HOSPITAL ENCOUNTER (OUTPATIENT)
Dept: PHYSICAL THERAPY | Age: 43
Discharge: HOME | End: 2023-02-09
Payer: MEDICARE

## 2023-02-09 PROCEDURE — 97140 MANUAL THERAPY 1/> REGIONS: CPT | Performed by: PHYSICAL THERAPIST

## 2023-02-09 PROCEDURE — 97110 THERAPEUTIC EXERCISES: CPT | Performed by: PHYSICAL THERAPIST

## 2023-02-09 NOTE — PROGRESS NOTES
[facility] [address], [address] [facility]    OUTPATIENT PHYSICAL THERAPY DAILY TREATMENT NOTE WITH DISCHARGE  VISIT: 7    NAME: Marilin Thomas : 1980 AGE: 38 y.o. GENDER: female  DATE: 2023  REFERRING PHYSICIAN: Todd Gutierrez MD    GOALS  Short term goals  Time frame: 4 weeks  1. Patient will be compliant and independent with the initial HEP as evidenced by being able to perform without cuing. 2. Patient will report a 25% improvement in symptoms. MET  3. Patient report a 25% improvement in sleeping. 4. Patient will have an increased tolerance for standing to allow 20 minutes of the activity before symptoms start. MET  5. Patient will tolerate 20 minutes of clinic activities before increase of symptoms. MET     Long term goals  Time frame: 8 weeks  1. Patient will report pain level decrease to 3/10 to allow increased ease of movement. MET  2. Patient will have an improved score on the LEFS outcome measure by 9 points to demonstrate an increase in functional activity tolerance. MET  3. Patient will be independent in final individualized HEP. MET  4. Patient will have an increase in bilateral hip flexion strength to 5/5 to allow performance of household tasks. MET  5. Patient will return to walking without being limited by symptoms. MET  6. Patient will sleep 6-8 hours without being interrupted by pain. MET       SUBJECTIVE:   \"I'm doing pretty well. \"    Pain In: 0/10 low back      OBJECTIVE DATA SUMMARY:     EXAMINATION/PRESENTATION/DECISION MAKING:   Pain:  Location: Low back, BLEs, B hands  Quality: aching, stiffness  Now: 0/10  Best: 0/10  Worst: 0/10  Factors that improve pain: stretch/exercises     Posture:    WNL     Strength:                                          LEFT                  RIGHT  Hip flexion                       5/5                     5/5  Hip abduction                 5/5                     5/5  Hip extension 5/5                     5/5  Knee flexion                    5/5                     5/5  Knee extension               5/5                     5/5  Ankle DF                         5/5                     5/5  Ankle PF                         5/5                     5/5      Range of Motion:   Lumbar Spine (AROM)  (*Measured 3rd finger from the floor)  Flexion*              3\"  Extension           25% limited; stiff   R side bend       25% Limited; discomfort on right side of low back  L side bend       WNL  R rotation           WNL  L rotation           WNL     Joint Mobility:   Tender with CPA of lower thoracic and lumbar spine in prone      Palpation:   Mildly tender to palpation at right QL, lateral hips, lumbar paraspinals      Neurologic Assessment:               Tone: Normal               Sensation: Intact to light touch; tingling/numbness in B hands               Reflexes: NT     Special Tests:   (-) SLR, right     Mobility:               Transitional Movements: WNL               Gait: Slow pace  Balance:  WNL     Functional Measure:   LEFS: 34/80 on evaluation  LEFS: 48/80 on discharge     TREATMENT/INTERVENTION:  Interventions in BOLD performed this date:      Modalities (Rationale): to decrease pain and muscle guarding  MHP to low back in supine for 8 minutes at end of session; no skin irritation noted     Therapeutic Exercises: to develop strength, endurance, range of motion, and flexibility  Initial HEP provided 1/10/2023 (Access Code [email or web address]): sidelying open book stretch; LTR; pelvic tilts; hookyling marches  Added to HEP 1/17/23: forward/lateral trunk flex with SWB, modified QL stretch    Supine:   LTR: 10 reps B  Pelvic tilts: 10 reps  Hip ER with red TB: 10 reps  Ball squeeze with PPT: 15 reps     Sidelying:   Open book stretch: 10 reps B      Seated:  Fwd/Lateral trunk flex with blue physioball: 5 reps each    Standing:  Hip abduction: 10 reps each  Marches: 10 reps each     Manual Therapy: for joint mobilization/manipulations and soft tissue mobilization  STM R glute med, and lumbar paraspinals with lacrosse ball      Activity tolerance and post treatment pain report:   Good; 0/10    Education:  Education was provided to the patient on the following topics: continue to perform HEP in pain free ROM. []    No changes were made to the home exercise program.  [x]    The following changes were made to the home exercise program: Reviewed comprehensive HEP and instructed to continue exercises in pain free ROM  Patient verbalized understanding of the topics presented. ASSESSMENT:   Patient discharged from physical therapy after 7 visits from 1/10/23 to 2/9/23. Patient presents with no low back pain this date. She no longer experiences radicular symptoms down LE. She demonstrates improved strength and endurance since evaluation. Patient takes breaks with daily activities as needed. She tolerated clinic exercises well with good form noted. Patient tolerated manual therapy well. Patient with significant improvement in LEFS outcome measure from 34/80 on evaluation to 48/80 on discharge. Reviewed comprehensive HEP and instructed to continue exercises in pain free ROM. Patient in agreement with discharge at this time. PLAN OF CARE:   Patient will be discharged from physical therapy at this time.   Criteria for termination of care:  [x]   Goals Achieved  []   [address]  []   Patient has not returned  []   Other  Plan for follow up, continuing care, or equipment recommendations: Patient instructed to continue exercises and call with questions as needed  Thank you for this referral.    Aleksey Salmon, PT   Time Calculation: 55 mins  Patient Time in clinic:   Start Time: 1400   Stop Time: 7306

## 2023-03-08 ENCOUNTER — TELEPHONE (OUTPATIENT)
Dept: SLEEP MEDICINE | Age: 43
End: 2023-03-08

## 2023-03-08 NOTE — TELEPHONE ENCOUNTER
LVM for patient who has VV apt scheduled. Device does not have remote access and we will need to change her apt to in person so that she can bring her device.

## 2023-03-15 ENCOUNTER — OFFICE VISIT (OUTPATIENT)
Dept: SLEEP MEDICINE | Age: 43
End: 2023-03-15
Payer: MEDICARE

## 2023-03-15 ENCOUNTER — DOCUMENTATION ONLY (OUTPATIENT)
Dept: SLEEP MEDICINE | Age: 43
End: 2023-03-15

## 2023-03-15 VITALS
TEMPERATURE: 98.2 F | OXYGEN SATURATION: 99 % | HEIGHT: 63 IN | SYSTOLIC BLOOD PRESSURE: 119 MMHG | DIASTOLIC BLOOD PRESSURE: 68 MMHG | HEART RATE: 90 BPM | WEIGHT: 283 LBS | BODY MASS INDEX: 50.14 KG/M2

## 2023-03-15 DIAGNOSIS — G47.33 OSA (OBSTRUCTIVE SLEEP APNEA): Primary | ICD-10-CM

## 2023-03-15 PROCEDURE — G8427 DOCREV CUR MEDS BY ELIG CLIN: HCPCS | Performed by: NURSE PRACTITIONER

## 2023-03-15 PROCEDURE — G8417 CALC BMI ABV UP PARAM F/U: HCPCS | Performed by: NURSE PRACTITIONER

## 2023-03-15 PROCEDURE — 99213 OFFICE O/P EST LOW 20 MIN: CPT | Performed by: NURSE PRACTITIONER

## 2023-03-15 PROCEDURE — G8432 DEP SCR NOT DOC, RNG: HCPCS | Performed by: NURSE PRACTITIONER

## 2023-03-15 PROCEDURE — 3078F DIAST BP <80 MM HG: CPT | Performed by: NURSE PRACTITIONER

## 2023-03-15 PROCEDURE — 3074F SYST BP LT 130 MM HG: CPT | Performed by: NURSE PRACTITIONER

## 2023-03-15 NOTE — PATIENT INSTRUCTIONS
[address] [facility] [place]. [place], [address]  Tel.  [phone]  Fax. [address] [facility] [address]  [place], [address] [facility]  Tel.  [phone]  Fax. [phone] [address]  Tel.  [phone]  Fax. [phone]     Learning About CPAP for Sleep Apnea  What is CPAP? CPAP is a small machine that you use at home every night while you sleep. It increases air pressure in your throat to keep your airway open. When you have sleep apnea, this can help you sleep better so you feel much better. CPAP stands for \"continuous positive airway pressure. \"  The CPAP machine will have one of the following:  A mask that covers your nose and mouth  Prongs that fit into your nose  A mask that covers your nose only, the most common type. This type is called NCPAP. The N stands for \"nasal.\"  Why is it done? CPAP is usually the best treatment for obstructive sleep apnea. It is the first treatment choice and the most widely used. Your doctor may suggest CPAP if you have: Moderate to severe sleep apnea. Sleep apnea and coronary artery disease (CAD) or heart failure. How does it help? CPAP can help you have more normal sleep, so you feel less sleepy and more alert during the daytime. CPAP may help keep heart failure or other heart problems from getting worse. NCPAP may help lower your blood pressure. If you use CPAP, your bed partner may also sleep better because you are not snoring or restless. What are the side effects? Some people who use CPAP have:  A dry or stuffy nose and a sore throat. Irritated skin on the face. Sore eyes. Bloating. If you have any of these problems, work with your doctor to fix them. Here are some things you can try:  Be sure the mask or nasal prongs fit well. See if your doctor can adjust the pressure of your CPAP. If your nose is dry, try a humidifier.   If your nose is runny or stuffy, try decongestant medicine or a steroid nasal spray. If these things do not help, you might try a different type of machine. Some machines have air pressure that adjusts on its own. Others have air pressures that are different when you breathe in than when you breathe out. This may reduce discomfort caused by too much pressure in your nose. Where can you learn more? Go to [employer].be  Enter Nia Elizondo in the search box to learn more about \"Learning About CPAP for Sleep Apnea. \"   © 1164-4331 Healthwise, Incorporated. Care instructions adapted under license by [employer] (which disclaims liability or warranty for this information). This care instruction is for use with your licensed healthcare professional. If you have questions about a medical condition or this instruction, always ask your healthcare professional. [address] any warranty or liability for your use of this information. Content Version: 3.4.58158; Last Revised: January 11, 2010  PROPER SLEEP HYGIENE    What to avoid  Do not have drinks with caffeine, such as coffee or black tea, for 8 hours before bed. Do not smoke or use other types of tobacco near bedtime. Nicotine is a stimulant and can keep you awake. Avoid drinking alcohol late in the evening, because it can cause you to wake in the middle of the night. Do not eat a big meal close to bedtime. If you are hungry, eat a light snack. Do not drink a lot of water close to bedtime, because the need to urinate may wake you up during the night. Do not read or watch TV in bed. Use the bed only for sleeping and sexual activity. What to try  Go to bed at the same time every night, and wake up at the same time every morning. Do not take naps during the day. Keep your bedroom quiet, dark, and cool. Get regular exercise, but not within 3 to 4 hours of your bedtime. .  Sleep on a comfortable pillow and mattress.   If watching the clock makes you anxious, turn it facing away from you so you cannot see the time. If you worry when you lie down, start a worry book. Well before bedtime, write down your worries, and then set the book and your concerns aside. Try meditation or other relaxation techniques before you go to bed. If you cannot fall asleep, get up and go to another room until you feel sleepy. Do something relaxing. Repeat your bedtime routine before you go to bed again. Make your house quiet and calm about an hour before bedtime. Turn down the lights, turn off the TV, log off the computer, and turn down the volume on music. This can help you relax after a busy day. Drowsy Driving: The [place] [address] cites drowsiness as a causing factor in more than 818,678 police reported crashes annually, resulting in 76,000 injuries and 1,500 deaths. Other surveys suggest 55% of people polled have driven while drowsy in the past year, 23% had fallen asleep but not crashed, 3% crashed, and 2% had and accident due to drowsy driving. Who is at risk? Young Drivers: One study of drowsy driving accidents states that 55% of the drivers were under 25 years. Of those, 75% were male. Shift Workers and Travelers: People who work overnight or travel across time zones frequently are at higher risk of experiencing Circadian Rhythm Disorders. They are trying to work and function when their body is programed to sleep. Sleep Deprived: Lack of sleep has a serious impact on your ability to pay attention or focus on a task. Consistently getting less than the average of 8 hours your body needs creates partial or cumulative sleep deprivation. Untreated Sleep Disorders: Sleep Apnea, Narcolepsy, R.L.S., and other sleep disorders (untreated) prevent a person from getting enough restful sleep. This leads to excessive daytime sleepiness and increases the risk for drowsy driving accidents by up to 7 times.   Medications / Alcohol: Even over the counter medications can cause drowsiness. Medications that impair a drivers attention should have a warning label. Alcohol naturally makes you sleepy and on its own can cause accidents. Combined with excessive drowsiness its effects are amplified. Signs of Drowsy Driving:   * You don't remember driving the last few miles   * You may drift out of your darcy   * You are unable to focus and your thoughts wander   * You may yawn more often than normal   * You have difficulty keeping your eyes open / nodding off   * Missing traffic signs, speeding, or tailgating  Prevention-   Good sleep hygiene, lifestyle and behavioral choices have the most impact on drowsy driving. There is no substitute for sleep and the average person requires 8 hours nightly. If you find yourself driving drowsy, stop and sleep. Consider the sleep hygiene tips provided during your visit as well. Medication Refill Policy: Refills for all medications require 1 week advance notice. Please have your pharmacy fax a refill request. We are unable to fax, or call in \"controled substance\" medications and you will need to pick these prescriptions up from our office. [employer] Activation    Thank you for requesting access to [employer]. Please follow the instructions below to securely access and download your online medical record. [employer] allows you to send messages to your doctor, view your test results, renew your prescriptions, schedule appointments, and more. How Do I Sign Up? In your internet browser, go to [email or web address]. Click on the First Time User? Click Here link in the Sign In box. You will see the New Member Sign Up page. Enter your [employer] Access Code exactly as it appears below. You will not need to use this code after youve completed the sign-up process. If you do not sign up before the expiration date, you must request a new code. [employer] Access Code:  Activation code not generated  Current [facility] Status: Active (This is the date your [employer] access code will )    Enter the last four digits of your Social Security Number (xxxx) and Date of Birth (mm/dd/yyyy) as indicated and click Submit. You will be taken to the next sign-up page. Create a [employer] ID. This will be your [employer] login ID and cannot be changed, so think of one that is secure and easy to remember. Create a [employer] password. You can change your password at any time. Enter your Password Reset Question and Answer. This can be used at a later time if you forget your password. Enter your e-mail address. You will receive e-mail notification when new information is available in [address] Click Sign Up. You can now view and download portions of your medical record. Click the ideeli link to download a portable copy of your medical information. Additional Information    If you have questions, please call [phone]. Remember, [employer] is NOT to be used for urgent needs. For medical emergencies, dial 911.

## 2023-03-15 NOTE — PROGRESS NOTES
[address] [place]. [place], [address]   Tel.  [phone]   Fax. [address]   [place], [address] [facility]   Tel.  [phone]   Fax. [phone] [address], [address]   Tel.  [phone]   Fax. [address] [facility] (: 1980) is a 38 y.o. female, established patient, seen for positive airway pressure follow-up and evaluation. She was last seen by Dr. Vidhi Dyer on 3/10/2022, prior notes and sleep tests reviewed in detail. In lab split sleep test 2015 showed AHI of 48.2/hr with a lowest SaO2 of 89%, titration showed adequate treatment at a pressure of 7 cmH2O. Weight at time of sleep testing 282 pounds. ASSESSMENT/PLAN:    ICD-10-CM ICD-9-CM    1. RILEY (obstructive sleep apnea)  G47.33 327.23 AMB SUPPLY ORDER      2. Adult BMI 50.0-59.9 kg/sq m (HCC)  Z68.43 V85.43           AHI = 48.2(2015). On APAP, Resmed :  6-20 cmH2O. Set up 10/19/2015. She is adherent with PAP therapy and PAP continues to benefit patient and remains necessary for control of her sleep apnea. Her device is eligible for replacement based on age. Follow-up and Dispositions    Return in about 3 months (around 6/15/2023) for 1st adherence visit on new device. Sleep Apnea -     Continue on current pressures    * New APAP device needed, current device has exceeded its life expectancy, is outdated and new technology is required. Her device is over 9years old and she would like to replace. Orders Placed This Encounter    AMB SUPPLY ORDER     Diagnosis: (G47.33) RILEY (obstructive sleep apnea)  (primary encounter diagnosis)     New APAP device needed, current device has exceeded its life expectancy, is outdated and new technology is required. Positive Airway Pressure Therapy: Duration of need: 99 months.   ResMed APAP Device: Minimum Pressure: 6 cmH2O, Maximum Pressure: 16 cmH2O. Ramp Time: 20 Minutes.   EPR: 2.  [id number] Heated Humidifier  [place] Mode Access  Length of Need: 99 months     Nasal Pillows (Replace) 2 per month.  Nasal Interface Mask 1 every 3 months.  Pos Airway pressure chin strap   Headgear 1 every 6 months.  Tubing with heating element 1 every 3 months.  Filter(s) Disposable 2 per month.  Filter(s) Non-Disposable 1 every 6 months. .   [address] [facility] for Humidifier (Replace) 1 every 6 months. Erin Lowery [facility]; NPI: [id number]    Electronically signed. Date:- 03/15/23       * Counseling was provided regarding the importance of regular PAP use with emphasis on ensuring sufficient total sleep time, proper sleep hygiene, and safe driving. * Re-enforced proper and regular cleaning for the device. We discussed the risk associated with use of cleaning devices and she will continue with use of dish soap and water as the appropriate cleaning method. * She was asked to contact our office for any problems regarding PAP therapy. 2. Encouraged continued weight management program through appropriate diet and exercise regimen as significant weight reduction has been shown to reduce severity of obstructive sleep apnea. She is walking and sometimes with her dog. SUBJECTIVE/OBJECTIVE:    She  is seen today for follow up on PAP device and reports no problems using the device. The following concerns identified:    Drowsiness no Problems exhaling no   Snoring no Forget to put on no   Mask Comfortable yes Can't fall asleep no   Dry Mouth no Mask falls off no   Air Leaking no Frequent awakenings no       She admits that her sleep has improved on PAP therapy using nasal pillows mask and heated tubing. She reports feeling rested when she uses the device and she does not sleep well without it.     Review of device download indicated:  Auto pressure: 6-20 cmH2O; Max Pressure: 12.9 cmH2O;  95th percentile Pressure: 11.1 cmH2O   95th Percentile Leak: 8.8 L/Min % Used Days >= 4 hours: 98. Avg hours used:  7:10. Therapy Apnea Index averaged over PAP use: 0.9 /hr which reflects improved sleep breathing condition. [place] Sleepiness Score: (P) 6 and Modified F.O.S.Q. Score Total / 2: (P) 15.5 which reflects improved sleep quality over therapy time. CO2 23 mmol/L on labs 11/3/2022    Sleep Review of Systems: notable for Negative difficulty falling asleep; Negative awakenings at night; Negative early morning headaches; Negative memory problems; Negative concentration issues; Negative chest pain; Negative shortness of breath; Negative significant joint pain at night; Negative significant muscle pain at night; Negative rashes or itching; Negative heartburn; Negative significant mood issues; 0 afternoon naps per week      Visit Vitals  /68 (BP 1 Location: Left lower arm, BP Patient Position: Sitting, BP Cuff Size: Large adult long)   Pulse 90   Temp 98.2 °F (36.8 °C) (Temporal)   Ht 5' 3\" (1.6 m)   Wt 283 lb (128.4 kg)   LMP 04/17/2013   SpO2 99%   BMI 50.13 kg/m²          General:   Alert, oriented, not in acute distress   Eyes:  Anicteric Sclerae; no obvious strabismus   Nose:  No obvious nasal septum deviation    Neck:   Midline trachea   Chest/Lungs:  Symmetrical lung expansion, clear lung fields on auscultation    CVS:  Normal rate, regular rhythm,  no JVD   Extremities:  No obvious rashes, no edema    Neuro:  No focal deficits; No obvious tremor    Psych:  Normal affect,  normal countenance       An electronic signature was used to authenticate this note.     -- Buell Meigs, NP, [facility]  03/15/23

## 2023-03-22 ENCOUNTER — TELEPHONE (OUTPATIENT)
Dept: RHEUMATOLOGY | Age: 43
End: 2023-03-22

## 2023-03-22 NOTE — TELEPHONE ENCOUNTER
Returned call to patient for clarification:    Patient finished Physical Therapy in early Feb and feels that it helped a lot with her back pain. She has not had any Rasuvo injections in a while (can not remember when it was last injected), but she wanted to know if she should go back on it now. She generally feels better, but she has her good days and bad days with her hand pain. Her Neurologist stated that her nerves may be the cause of the pain, patient is unsure if she has nerve damage. She also wanted to advise her Lyrica was increased to 200mg. She is booked for an appointment on 4/6/23.

## 2023-06-23 ENCOUNTER — OFFICE VISIT (OUTPATIENT)
Age: 43
End: 2023-06-23
Payer: MEDICAID

## 2023-06-23 VITALS
HEART RATE: 88 BPM | SYSTOLIC BLOOD PRESSURE: 105 MMHG | TEMPERATURE: 97 F | DIASTOLIC BLOOD PRESSURE: 71 MMHG | BODY MASS INDEX: 50.49 KG/M2 | WEIGHT: 285 LBS | RESPIRATION RATE: 16 BRPM | OXYGEN SATURATION: 97 %

## 2023-06-23 DIAGNOSIS — Z79.60 LONG TERM (CURRENT) USE OF UNSPECIFIED IMMUNOMODULATORS AND IMMUNOSUPPRESSANTS: ICD-10-CM

## 2023-06-23 DIAGNOSIS — M06.09 RHEUMATOID ARTHRITIS WITHOUT RHEUMATOID FACTOR, MULTIPLE SITES (HCC): ICD-10-CM

## 2023-06-23 DIAGNOSIS — M10.9 GOUT WITHOUT TOPHUS: ICD-10-CM

## 2023-06-23 DIAGNOSIS — M06.09 RHEUMATOID ARTHRITIS WITHOUT RHEUMATOID FACTOR, MULTIPLE SITES (HCC): Primary | ICD-10-CM

## 2023-06-23 PROCEDURE — G8427 DOCREV CUR MEDS BY ELIG CLIN: HCPCS | Performed by: INTERNAL MEDICINE

## 2023-06-23 PROCEDURE — 99215 OFFICE O/P EST HI 40 MIN: CPT | Performed by: INTERNAL MEDICINE

## 2023-06-23 PROCEDURE — 1036F TOBACCO NON-USER: CPT | Performed by: INTERNAL MEDICINE

## 2023-06-23 PROCEDURE — 3074F SYST BP LT 130 MM HG: CPT | Performed by: INTERNAL MEDICINE

## 2023-06-23 PROCEDURE — 3078F DIAST BP <80 MM HG: CPT | Performed by: INTERNAL MEDICINE

## 2023-06-23 PROCEDURE — G8417 CALC BMI ABV UP PARAM F/U: HCPCS | Performed by: INTERNAL MEDICINE

## 2023-06-23 RX ORDER — ALLOPURINOL 300 MG/1
300 TABLET ORAL DAILY
Qty: 90 TABLET | Refills: 1 | Status: SHIPPED | OUTPATIENT
Start: 2023-06-23

## 2023-06-23 RX ORDER — FOLIC ACID 1 MG/1
1 TABLET ORAL DAILY
Qty: 90 TABLET | Refills: 3 | Status: SHIPPED | OUTPATIENT
Start: 2023-06-23

## 2023-06-23 RX ORDER — PREGABALIN 200 MG/1
CAPSULE ORAL
COMMUNITY
Start: 2023-05-04

## 2023-06-23 ASSESSMENT — PATIENT HEALTH QUESTIONNAIRE - PHQ9
2. FEELING DOWN, DEPRESSED OR HOPELESS: 1
SUM OF ALL RESPONSES TO PHQ QUESTIONS 1-9: 1
SUM OF ALL RESPONSES TO PHQ QUESTIONS 1-9: 1
1. LITTLE INTEREST OR PLEASURE IN DOING THINGS: 0
SUM OF ALL RESPONSES TO PHQ QUESTIONS 1-9: 1
SUM OF ALL RESPONSES TO PHQ9 QUESTIONS 1 & 2: 1
SUM OF ALL RESPONSES TO PHQ QUESTIONS 1-9: 1

## 2023-06-23 NOTE — PATIENT INSTRUCTIONS
Continue taking 10 pills of methotrexate weekly with daily folic acid supplements. Ideally, your PCP could check labs in 3 months before your visit with your next rheumatologist, to make sure liver enzymes are still normal and to hold the methotrexate if they do increase. Start wearing the arthritis compression gloves when you can to help relieve your hand pain. Start wearing your carpal tunnel splints to help with you finger joint pain at night. Continue to work on weight loss. Remember that every pound lost takes 4 pounds of pressure off of the knees. Weight loss mostly comes down to creating a caloric deficit through diet as opposed to exercise. Complete labs within the next two weeks. I am unfortunately leaving the practice and the [place] area after June; our practice has sent out letters with recommendations on area practices you can reach out to, as I'm afraid the chances they'll be able to get a provider in to take my place quickly are pretty low. I'd recommend seeing a new provider by October.

## 2023-06-23 NOTE — PROGRESS NOTES
REASON FOR VISIT    This is an in-person follow-up visit for Ms. Marylee Brace for     ICD-10-CM   1. Seronegative rheumatoid arthritis of multiple sites ([facility]  M06.09       Inflammatory arthritis phenotype includes:  Anti-CCP positive: no  Rheumatoid factor positive: no  Erosive disease: yes  Extra-articular manifestations include: polyclonal gammopathy (elevated IgA, IgG), sensory neuropathy    Immunosuppression Screening (6/26/2020):  Quantiferon TB: negative  PPD:  Not performed  Hepatitis B: negative  Hepatitis C: negative    Therapy History includes:  Current DMARD therapy include: methotrexate 25 mg every Friday (7/03/2020 to 8/24/2021; 10/22/2021 to present)  Prior DMARD therapy include: none  Discontinued DMARDs because of inefficacy: None  Discontinued DMARDs because of side effects: None    HISTORY OF PRESENT ILLNESS  The patient returns for a follow up. Last visit 4/14/2023. Pt notes slight improvement after taking 10 pills of methotrexate weekly on Friday with daily folic acid supplements. She denies GI symptoms. She has some days where she feels increased joint pain. She has been taking Trulicity since May which she likes better than the Victoza. When the weather worsens, her joints ache. At nighttime, she has flare ups. It takes her 30 minutes to recover from morning stiffness. She describes imbalance and dizziness in the shower. Pt denies rashes but her fingertips change colors when she is exposed to the cold. She states that she has a throbbing pain in her hands. She has an improvement with her  in her hands after she started taking methotrexate. No distal digital ulcers. Pt has leg swelling especially on her left leg, but resolves with elevation. Pt is not working currently but she is staying active around the house. She lives with her daughter and cooks for her family. She has not gotten her arthritis compression gloves. She has a heel spur which she has special socks for.     REVIEW

## 2023-06-23 NOTE — PROGRESS NOTES
Chief Complaint   Patient presents with    Joint Pain     1. Have you been to the ER, urgent care clinic since your last visit? Hospitalized since your last visit? No    2. Have you seen or consulted any other health care providers outside of the [address] [place] [address] since your last visit? Include any pap smears or colon screening.  No

## 2023-07-01 LAB
ALBUMIN SERPL-MCNC: 4.2 G/DL (ref 3.8–4.8)
ALBUMIN/GLOB SERPL: 1.2 {RATIO} (ref 1.2–2.2)
ALP SERPL-CCNC: 78 IU/L (ref 44–121)
ALT SERPL-CCNC: 12 IU/L (ref 0–32)
AST SERPL-CCNC: 11 IU/L (ref 0–40)
BASOPHILS # BLD AUTO: 0.1 X10E3/UL (ref 0–0.2)
BASOPHILS NFR BLD AUTO: 1 %
BILIRUB SERPL-MCNC: 0.3 MG/DL (ref 0–1.2)
BUN SERPL-MCNC: 9 MG/DL (ref 6–24)
BUN/CREAT SERPL: 14 (ref 9–23)
CALCIUM SERPL-MCNC: 9.4 MG/DL (ref 8.7–10.2)
CHLORIDE SERPL-SCNC: 102 MMOL/L (ref 96–106)
CO2 SERPL-SCNC: 22 MMOL/L (ref 20–29)
CREAT SERPL-MCNC: 0.65 MG/DL (ref 0.57–1)
CRP SERPL-MCNC: 13 MG/L (ref 0–10)
EGFRCR SERPLBLD CKD-EPI 2021: 111 ML/MIN/1.73
EOSINOPHIL # BLD AUTO: 0.2 X10E3/UL (ref 0–0.4)
EOSINOPHIL NFR BLD AUTO: 2 %
ERYTHROCYTE [DISTWIDTH] IN BLOOD BY AUTOMATED COUNT: 13.6 % (ref 11.7–15.4)
ERYTHROCYTE [SEDIMENTATION RATE] IN BLOOD BY WESTERGREN METHOD: 30 MM/HR (ref 0–32)
GLOBULIN SER CALC-MCNC: 3.6 G/DL (ref 1.5–4.5)
GLUCOSE SERPL-MCNC: 105 MG/DL (ref 70–99)
HCT VFR BLD AUTO: 39.5 % (ref 34–46.6)
HGB BLD-MCNC: 13.3 G/DL (ref 11.1–15.9)
IMM GRANULOCYTES # BLD AUTO: 0 X10E3/UL (ref 0–0.1)
IMM GRANULOCYTES NFR BLD AUTO: 0 %
LYMPHOCYTES # BLD AUTO: 4.6 X10E3/UL (ref 0.7–3.1)
LYMPHOCYTES NFR BLD AUTO: 51 %
MCH RBC QN AUTO: 31.3 PG (ref 26.6–33)
MCHC RBC AUTO-ENTMCNC: 33.7 G/DL (ref 31.5–35.7)
MCV RBC AUTO: 93 FL (ref 79–97)
MONOCYTES # BLD AUTO: 0.7 X10E3/UL (ref 0.1–0.9)
MONOCYTES NFR BLD AUTO: 7 %
NEUTROPHILS # BLD AUTO: 3.6 X10E3/UL (ref 1.4–7)
NEUTROPHILS NFR BLD AUTO: 39 %
PLATELET # BLD AUTO: 354 X10E3/UL (ref 150–450)
POTASSIUM SERPL-SCNC: 4.6 MMOL/L (ref 3.5–5.2)
PROT SERPL-MCNC: 7.8 G/DL (ref 6–8.5)
RBC # BLD AUTO: 4.25 X10E6/UL (ref 3.77–5.28)
SODIUM SERPL-SCNC: 143 MMOL/L (ref 134–144)
URATE SERPL-MCNC: 4.5 MG/DL (ref 2.6–6.2)
WBC # BLD AUTO: 9.2 X10E3/UL (ref 3.4–10.8)

## 2023-07-10 ENCOUNTER — TRANSCRIBE ORDERS (OUTPATIENT)
Facility: HOSPITAL | Age: 43
End: 2023-07-10

## 2023-07-10 DIAGNOSIS — Z12.31 VISIT FOR SCREENING MAMMOGRAM: Primary | ICD-10-CM

## 2023-07-24 ENCOUNTER — HOSPITAL ENCOUNTER (OUTPATIENT)
Facility: HOSPITAL | Age: 43
Discharge: HOME | End: 2023-07-27
Payer: MEDICAID

## 2023-07-24 DIAGNOSIS — Z12.31 VISIT FOR SCREENING MAMMOGRAM: ICD-10-CM

## 2023-07-24 PROCEDURE — 77067 SCR MAMMO BI INCL CAD: CPT

## 2023-08-09 ENCOUNTER — TELEPHONE (OUTPATIENT)
Age: 43
End: 2023-08-09

## 2023-10-03 ENCOUNTER — TELEPHONE (OUTPATIENT)
Age: 43
End: 2023-10-03

## 2023-10-11 ENCOUNTER — CLINICAL DOCUMENTATION (OUTPATIENT)
Age: 43
End: 2023-10-11

## 2023-10-11 ENCOUNTER — OFFICE VISIT (OUTPATIENT)
Age: 43
End: 2023-10-11
Payer: MEDICAID

## 2023-10-11 VITALS
HEART RATE: 98 BPM | WEIGHT: 285.5 LBS | OXYGEN SATURATION: 98 % | HEIGHT: 63 IN | DIASTOLIC BLOOD PRESSURE: 85 MMHG | BODY MASS INDEX: 50.59 KG/M2 | SYSTOLIC BLOOD PRESSURE: 130 MMHG

## 2023-10-11 DIAGNOSIS — G47.33 OBSTRUCTIVE SLEEP APNEA (ADULT) (PEDIATRIC): Primary | ICD-10-CM

## 2023-10-11 PROCEDURE — 3079F DIAST BP 80-89 MM HG: CPT | Performed by: NURSE PRACTITIONER

## 2023-10-11 PROCEDURE — 3075F SYST BP GE 130 - 139MM HG: CPT | Performed by: NURSE PRACTITIONER

## 2023-10-11 PROCEDURE — 99213 OFFICE O/P EST LOW 20 MIN: CPT | Performed by: NURSE PRACTITIONER

## 2023-10-11 ASSESSMENT — SLEEP AND FATIGUE QUESTIONNAIRES
HAS YOUR RELATIONSHIP WITH FAMILY, FRIENDS OR WORK COLLEAGUES BEEN AFFECTED BECAUSE YOU ARE SLEEPY OR TIRED: YES, A LITTLE
HOW LIKELY ARE YOU TO NOD OFF OR FALL ASLEEP WHILE SITTING AND TALKING TO SOMEONE: WOULD NEVER DOZE
HOW LIKELY ARE YOU TO NOD OFF OR FALL ASLEEP WHILE SITTING AND TALKING TO SOMEONE: 0
DO YOU HAVE DIFFICULTY BEING AS ACTIVE AS YOU WANT TO BE IN THE MORNING BECAUSE YOU ARE SLEEPY OR TIRED: YES, LITTLE
DO YOU GENERALLY HAVE DIFFICULTY REMEMBERING THINGS BECAUSE YOU ARE SLEEPY OR TIRED: YES, EXTREME
HOW LIKELY ARE YOU TO NOD OFF OR FALL ASLEEP WHILE LYING DOWN TO REST IN THE AFTERNOON WHEN CIRCUMSTANCES PERMIT: 1
HOW LIKELY ARE YOU TO NOD OFF OR FALL ASLEEP WHILE SITTING QUIETLY AFTER LUNCH WITHOUT ALCOHOL: SLIGHT CHANCE OF DOZING
DO YOU HAVE DIFFICULTY OPERATING A MOTOR VEHICLE FOR SHORT DISTANCES (LESS THAN 100 MILES) BECAUSE YOU BECOME SLEEPY: NO
ESS TOTAL SCORE: 5
HOW LIKELY ARE YOU TO NOD OFF OR FALL ASLEEP WHILE SITTING INACTIVE IN A PUBLIC PLACE: WOULD NEVER DOZE
HOW LIKELY ARE YOU TO NOD OFF OR FALL ASLEEP IN A CAR, WHILE STOPPED FOR A FEW MINUTES IN TRAFFIC: WOULD NEVER DOZE
HOW LIKELY ARE YOU TO NOD OFF OR FALL ASLEEP WHILE LYING DOWN TO REST IN THE AFTERNOON WHEN CIRCUMSTANCES PERMIT: SLIGHT CHANCE OF DOZING
DO YOU HAVE DIFFICULTY BEING AS ACTIVE AS YOU WANT TO BE IN THE EVENING BECAUSE YOU ARE SLEEPY OR TIRED: YES, LITTLE
HOW LIKELY ARE YOU TO NOD OFF OR FALL ASLEEP WHILE WATCHING TV: 2
HAS YOUR MOOD BEEN AFFECTED BECAUSE YOU ARE SLEEPY OR TIRED: YES, LITTLE
HOW LIKELY ARE YOU TO NOD OFF OR FALL ASLEEP WHILE SITTING QUIETLY AFTER LUNCH WITHOUT ALCOHOL: 1
HOW LIKELY ARE YOU TO NOD OFF OR FALL ASLEEP WHILE WATCHING TV: MODERATE CHANCE OF DOZING
HOW LIKELY ARE YOU TO NOD OFF OR FALL ASLEEP WHEN YOU ARE A PASSENGER IN A CAR FOR AN HOUR WITHOUT A BREAK: WOULD NEVER DOZE
FOSQ SCORE: 16
DO YOU HAVE DIFFICULTY WATCHING A MOVIE OR VIDEO BECAUSE YOU BECOME SLEEPY OR TIRED: NO
DO YOU HAVE DIFFICULTY VISITING YOUR FAMILY OR FRIENDS IN THEIR HOME BECAUSE YOU BECOME SLEEPY OR TIRED: NO
HOW LIKELY ARE YOU TO NOD OFF OR FALL ASLEEP WHILE SITTING AND READING: 1
DO YOU HAVE DIFFICULTY CONCENTRATING ON THE THINGS YOU DO BECAUSE YOU ARE SLEEPY OR TIRED: YES, A LITTLE
HOW LIKELY ARE YOU TO NOD OFF OR FALL ASLEEP IN A CAR, WHILE STOPPED FOR A FEW MINUTES IN TRAFFIC: 0
HOW LIKELY ARE YOU TO NOD OFF OR FALL ASLEEP WHEN YOU ARE A PASSENGER IN A CAR FOR AN HOUR WITHOUT A BREAK: 0
DO YOU HAVE DIFFICULTY OPERATING A MOTOR VEHICLE FOR LONG DISTANCES (GREATER THAN 100 MILES) BECAUSE YOU BECOME SLEEPY: NO
HOW LIKELY ARE YOU TO NOD OFF OR FALL ASLEEP WHILE SITTING INACTIVE IN A PUBLIC PLACE: 0
HOW LIKELY ARE YOU TO NOD OFF OR FALL ASLEEP WHILE SITTING AND READING: SLIGHT CHANCE OF DOZING

## 2023-10-11 NOTE — PATIENT INSTRUCTIONS
you cannot see the time. If you worry when you lie down, start a worry book. Well before bedtime, write down your worries, and then set the book and your concerns aside. Try meditation or other relaxation techniques before you go to bed. If you cannot fall asleep, get up and go to another room until you feel sleepy. Do something relaxing. Repeat your bedtime routine before you go to bed again. Make your house quiet and calm about an hour before bedtime. Turn down the lights, turn off the TV, log off the computer, and turn down the volume on music. This can help you relax after a busy day. Drowsy Driving: The [address] [place] [address] cites drowsiness as a causing factor in more than 225,510 police reported crashes annually, resulting in 76,000 injuries and 1,500 deaths. Other surveys suggest 55% of people polled have driven while drowsy in the past year, 23% had fallen asleep but not crashed, 3% crashed, and 2% had and accident due to drowsy driving. Who is at risk? Young Drivers: One study of drowsy driving accidents states that 55% of the drivers were under 25 years. Of those, 75% were male. Shift Workers and Travelers: People who work overnight or travel across time zones frequently are at higher risk of experiencing Circadian Rhythm Disorders. They are trying to work and function when their body is programed to sleep. Sleep Deprived: Lack of sleep has a serious impact on your ability to pay attention or focus on a task. Consistently getting less than the average of 8 hours your body needs creates partial or cumulative sleep deprivation. Untreated Sleep Disorders: Sleep Apnea, Narcolepsy, R.L.S., and other sleep disorders (untreated) prevent a person from getting enough restful sleep. This leads to excessive daytime sleepiness and increases the risk for drowsy driving accidents by up to 7 times.   Medications / Alcohol: Even over the counter medications can cause

## 2023-10-11 NOTE — PROGRESS NOTES
[address] [facility] [address] [place]. [place], [address]   Tel.  [phone]   Fax. [address] [facility], [place]   Tel.  [phone]   Fax. [phone]  [facility], [address] [facility]   Tel.  [phone]   Fax. [phone] (: 1980) is a 37 y.o. female, established patient, seen for positive airway pressure follow-up and evaluation. She was last seen by me on 3/15/2023, previously seen by Dr. Nehemias Patton on 3/10/2022, prior notes and sleep tests reviewed in detail. In lab split sleep  test 2015 showed AHI of 48.2/hr with a lowest SaO2 of 89%, titration showed adequate treatment at a pressure of 7 cmH2O. Weight at time of sleep testing 282 pounds. s. ASSESSMENT/PLAN:   Diagnosis Orders   1. Obstructive sleep apnea (adult) (pediatric)  DME Order for (Specify) as OP      2. Adult BMI 50.0-59.9 kg/sq m (HCC)            AHI = 48.2(2015). On APAP, Resmed :  6-16 cmH2O. Set up 2023    She is adherent with PAP therapy and PAP continues to benefit patient and remains necessary for control of her sleep apnea. Follow-up and Dispositions    Return in about 1 year (around 10/11/2024) for Annual follow up. Sleep Apnea -  Continue on current pressures    * Supplies ordered - nasal pillows mask and heated tubing    Orders Placed This Encounter   Procedures    DME Order for (Specify) as OP     Patient has a history and examination consistent with the diagnosis of sleep apnea.  Nasal Pillows (Replace) 2 per month.  Nasal Interface Mask 1 every 3 months.  Pos Airway pressure chin strap   Headgear 1 every 6 months.  Tubing with heating element 1 every 3 months.  Filter(s) Disposable 2 per month.  Filter(s) Non-Disposable 1 every 6 months. .   [address]  for Humidifier (Replace) 1 every 6 months.       JIGAR ClemonsBC; NPI: [id number]    Electronically

## 2024-07-23 ENCOUNTER — TRANSCRIBE ORDERS (OUTPATIENT)
Facility: HOSPITAL | Age: 44
End: 2024-07-23

## 2024-07-23 DIAGNOSIS — Z12.31 VISIT FOR SCREENING MAMMOGRAM: Primary | ICD-10-CM

## 2024-07-31 ENCOUNTER — HOSPITAL ENCOUNTER (OUTPATIENT)
Facility: HOSPITAL | Age: 44
Discharge: HOME | End: 2024-08-03
Payer: MEDICAID

## 2024-07-31 VITALS — WEIGHT: 270 LBS | HEIGHT: 63 IN | BODY MASS INDEX: 47.84 KG/M2

## 2024-07-31 DIAGNOSIS — Z12.31 VISIT FOR SCREENING MAMMOGRAM: ICD-10-CM

## 2024-07-31 PROCEDURE — 77063 BREAST TOMOSYNTHESIS BI: CPT

## 2024-09-10 ENCOUNTER — OFFICE VISIT (OUTPATIENT)
Age: 44
End: 2024-09-10

## 2024-09-10 VITALS
OXYGEN SATURATION: 99 % | SYSTOLIC BLOOD PRESSURE: 138 MMHG | DIASTOLIC BLOOD PRESSURE: 82 MMHG | BODY MASS INDEX: 48.73 KG/M2 | HEART RATE: 89 BPM | RESPIRATION RATE: 18 BRPM | WEIGHT: 275 LBS | HEIGHT: 63 IN

## 2024-09-10 DIAGNOSIS — G31.84 MILD COGNITIVE IMPAIRMENT: ICD-10-CM

## 2024-09-10 DIAGNOSIS — F41.9 ANXIETY AND DEPRESSION: ICD-10-CM

## 2024-09-10 DIAGNOSIS — G62.9 POLYNEUROPATHY: Primary | ICD-10-CM

## 2024-09-10 DIAGNOSIS — M06.09 SERONEGATIVE RHEUMATOID ARTHRITIS OF MULTIPLE SITES (HCC): ICD-10-CM

## 2024-09-10 DIAGNOSIS — F32.A ANXIETY AND DEPRESSION: ICD-10-CM

## 2024-09-10 RX ORDER — GLIPIZIDE 5 MG/1
TABLET, FILM COATED, EXTENDED RELEASE ORAL
COMMUNITY
Start: 2024-06-04

## 2024-09-10 RX ORDER — PREGABALIN 200 MG/1
200 CAPSULE ORAL 2 TIMES DAILY PRN
Qty: 180 CAPSULE | Refills: 0 | Status: SHIPPED | OUTPATIENT
Start: 2024-09-10 | End: 2024-12-09

## 2024-09-10 RX ORDER — HYDROXYZINE HYDROCHLORIDE 25 MG/1
25 TABLET, FILM COATED ORAL
COMMUNITY
Start: 2024-07-12

## 2024-09-10 RX ORDER — DULAGLUTIDE 1.5 MG/.5ML
INJECTION, SOLUTION SUBCUTANEOUS
COMMUNITY
Start: 2024-08-24

## 2024-09-10 RX ORDER — BUSPIRONE HYDROCHLORIDE 30 MG/1
TABLET ORAL
COMMUNITY
Start: 2024-06-26

## 2024-09-10 RX ORDER — DAPAGLIFLOZIN 10 MG/1
TABLET, FILM COATED ORAL
COMMUNITY
Start: 2024-06-04

## 2024-09-10 RX ORDER — IBUPROFEN 800 MG/1
TABLET, FILM COATED ORAL
COMMUNITY
Start: 2024-07-24

## 2024-09-10 ASSESSMENT — PATIENT HEALTH QUESTIONNAIRE - PHQ9
SUM OF ALL RESPONSES TO PHQ QUESTIONS 1-9: 0
1. LITTLE INTEREST OR PLEASURE IN DOING THINGS: NOT AT ALL
2. FEELING DOWN, DEPRESSED OR HOPELESS: NOT AT ALL
SUM OF ALL RESPONSES TO PHQ QUESTIONS 1-9: 0
SUM OF ALL RESPONSES TO PHQ9 QUESTIONS 1 & 2: 0

## 2024-09-10 ASSESSMENT — ENCOUNTER SYMPTOMS
VOICE CHANGE: 0
TROUBLE SWALLOWING: 0

## 2024-09-11 ENCOUNTER — TELEPHONE (OUTPATIENT)
Age: 44
End: 2024-09-11

## 2024-09-17 ENCOUNTER — TELEPHONE (OUTPATIENT)
Age: 44
End: 2024-09-17

## 2024-09-18 DIAGNOSIS — F40.240 CLAUSTROPHOBIA: Primary | ICD-10-CM

## 2024-09-18 RX ORDER — LORAZEPAM 0.5 MG/1
TABLET ORAL
Qty: 2 TABLET | Refills: 0 | Status: SHIPPED | OUTPATIENT
Start: 2024-09-18 | End: 2024-10-02

## 2024-09-22 ENCOUNTER — HOSPITAL ENCOUNTER (EMERGENCY)
Facility: HOSPITAL | Age: 44
Discharge: HOME | End: 2024-09-22
Attending: EMERGENCY MEDICINE
Payer: MEDICARE

## 2024-09-22 ENCOUNTER — APPOINTMENT (OUTPATIENT)
Facility: HOSPITAL | Age: 44
End: 2024-09-22
Payer: MEDICARE

## 2024-09-22 VITALS
WEIGHT: 279 LBS | DIASTOLIC BLOOD PRESSURE: 84 MMHG | RESPIRATION RATE: 16 BRPM | TEMPERATURE: 98.6 F | HEART RATE: 116 BPM | SYSTOLIC BLOOD PRESSURE: 146 MMHG | OXYGEN SATURATION: 98 % | BODY MASS INDEX: 49.42 KG/M2

## 2024-09-22 DIAGNOSIS — J02.9 ACUTE PHARYNGITIS, UNSPECIFIED ETIOLOGY: Primary | ICD-10-CM

## 2024-09-22 LAB
ANION GAP SERPL CALC-SCNC: 10 MMOL/L (ref 2–12)
BUN SERPL-MCNC: 10 MG/DL (ref 6–20)
BUN/CREAT SERPL: 13 (ref 12–20)
CALCIUM SERPL-MCNC: 9.3 MG/DL (ref 8.5–10.1)
CHLORIDE SERPL-SCNC: 101 MMOL/L (ref 97–108)
CO2 SERPL-SCNC: 28 MMOL/L (ref 21–32)
CREAT SERPL-MCNC: 0.78 MG/DL (ref 0.55–1.02)
DEPRECATED S PYO AG THROAT QL EIA: NEGATIVE
GLUCOSE SERPL-MCNC: 212 MG/DL (ref 65–100)
POTASSIUM SERPL-SCNC: 3.7 MMOL/L (ref 3.5–5.1)
SODIUM SERPL-SCNC: 139 MMOL/L (ref 136–145)

## 2024-09-22 PROCEDURE — 96375 TX/PRO/DX INJ NEW DRUG ADDON: CPT

## 2024-09-22 PROCEDURE — 96372 THER/PROPH/DIAG INJ SC/IM: CPT

## 2024-09-22 PROCEDURE — 80048 BASIC METABOLIC PNL TOTAL CA: CPT

## 2024-09-22 PROCEDURE — 6360000002 HC RX W HCPCS: Performed by: EMERGENCY MEDICINE

## 2024-09-22 PROCEDURE — 6360000004 HC RX CONTRAST MEDICATION: Performed by: EMERGENCY MEDICINE

## 2024-09-22 PROCEDURE — 99285 EMERGENCY DEPT VISIT HI MDM: CPT

## 2024-09-22 PROCEDURE — 96374 THER/PROPH/DIAG INJ IV PUSH: CPT

## 2024-09-22 PROCEDURE — 6370000000 HC RX 637 (ALT 250 FOR IP): Performed by: EMERGENCY MEDICINE

## 2024-09-22 PROCEDURE — 71260 CT THORAX DX C+: CPT

## 2024-09-22 PROCEDURE — 87070 CULTURE OTHR SPECIMN AEROBIC: CPT

## 2024-09-22 PROCEDURE — 87880 STREP A ASSAY W/OPTIC: CPT

## 2024-09-22 RX ORDER — IOPAMIDOL 755 MG/ML
100 INJECTION, SOLUTION INTRAVASCULAR
Status: COMPLETED | OUTPATIENT
Start: 2024-09-22 | End: 2024-09-22

## 2024-09-22 RX ORDER — CLINDAMYCIN HCL 300 MG
300 CAPSULE ORAL 3 TIMES DAILY
Qty: 30 CAPSULE | Refills: 0 | Status: SHIPPED | OUTPATIENT
Start: 2024-09-22 | End: 2024-10-02

## 2024-09-22 RX ORDER — OXYCODONE AND ACETAMINOPHEN 5; 325 MG/1; MG/1
2 TABLET ORAL
Status: COMPLETED | OUTPATIENT
Start: 2024-09-22 | End: 2024-09-22

## 2024-09-22 RX ORDER — OXYCODONE AND ACETAMINOPHEN 5; 325 MG/1; MG/1
1 TABLET ORAL EVERY 6 HOURS PRN
Qty: 4 TABLET | Refills: 0 | Status: SHIPPED | OUTPATIENT
Start: 2024-09-22 | End: 2024-09-25

## 2024-09-22 RX ORDER — LIDOCAINE HYDROCHLORIDE 20 MG/ML
15 SOLUTION OROPHARYNGEAL
Status: COMPLETED | OUTPATIENT
Start: 2024-09-22 | End: 2024-09-22

## 2024-09-22 RX ORDER — ONDANSETRON 2 MG/ML
4 INJECTION INTRAMUSCULAR; INTRAVENOUS EVERY 6 HOURS PRN
Status: DISCONTINUED | OUTPATIENT
Start: 2024-09-22 | End: 2024-09-22 | Stop reason: HOSPADM

## 2024-09-22 RX ORDER — IBUPROFEN 800 MG/1
800 TABLET, FILM COATED ORAL EVERY 8 HOURS PRN
Qty: 30 TABLET | Refills: 1 | Status: SHIPPED | OUTPATIENT
Start: 2024-09-22 | End: 2024-10-12

## 2024-09-22 RX ORDER — KETOROLAC TROMETHAMINE 30 MG/ML
30 INJECTION, SOLUTION INTRAMUSCULAR; INTRAVENOUS
Status: COMPLETED | OUTPATIENT
Start: 2024-09-22 | End: 2024-09-22

## 2024-09-22 RX ORDER — DEXAMETHASONE SODIUM PHOSPHATE 10 MG/ML
10 INJECTION, SOLUTION INTRAMUSCULAR; INTRAVENOUS
Status: COMPLETED | OUTPATIENT
Start: 2024-09-22 | End: 2024-09-22

## 2024-09-22 RX ADMIN — Medication 1 MG: at 09:15

## 2024-09-22 RX ADMIN — KETOROLAC TROMETHAMINE 30 MG: 30 INJECTION, SOLUTION INTRAMUSCULAR at 14:28

## 2024-09-22 RX ADMIN — LIDOCAINE HYDROCHLORIDE 15 ML: 20 SOLUTION ORAL at 10:44

## 2024-09-22 RX ADMIN — OXYCODONE HYDROCHLORIDE AND ACETAMINOPHEN 2 TABLET: 5; 325 TABLET ORAL at 14:28

## 2024-09-22 RX ADMIN — DEXAMETHASONE SODIUM PHOSPHATE 10 MG: 10 INJECTION INTRAMUSCULAR; INTRAVENOUS at 11:44

## 2024-09-22 RX ADMIN — ANTACID/ANTIFLATULENT 1 EACH: 380; 550; 10; 10 GRANULE, EFFERVESCENT ORAL at 09:16

## 2024-09-22 RX ADMIN — ONDANSETRON 4 MG: 2 INJECTION INTRAMUSCULAR; INTRAVENOUS at 13:55

## 2024-09-22 RX ADMIN — IOPAMIDOL 100 ML: 755 INJECTION, SOLUTION INTRAVENOUS at 13:02

## 2024-09-22 ASSESSMENT — PAIN - FUNCTIONAL ASSESSMENT: PAIN_FUNCTIONAL_ASSESSMENT: NONE - DENIES PAIN

## 2024-09-22 ASSESSMENT — LIFESTYLE VARIABLES
HOW MANY STANDARD DRINKS CONTAINING ALCOHOL DO YOU HAVE ON A TYPICAL DAY: 1 OR 2
HOW OFTEN DO YOU HAVE A DRINK CONTAINING ALCOHOL: 2-4 TIMES A MONTH

## 2024-09-22 ASSESSMENT — PAIN DESCRIPTION - LOCATION: LOCATION: THROAT

## 2024-09-22 ASSESSMENT — PAIN DESCRIPTION - DESCRIPTORS: DESCRIPTORS: DULL

## 2024-09-22 ASSESSMENT — PAIN DESCRIPTION - ORIENTATION: ORIENTATION: INNER

## 2024-09-24 LAB
BACTERIA SPEC CULT: NORMAL
SERVICE CMNT-IMP: NORMAL

## 2024-09-30 ENCOUNTER — HOSPITAL ENCOUNTER (OUTPATIENT)
Age: 44
Discharge: HOME | End: 2024-10-03
Payer: MEDICARE

## 2024-09-30 DIAGNOSIS — G31.84 MILD COGNITIVE IMPAIRMENT: ICD-10-CM

## 2024-09-30 PROCEDURE — 70551 MRI BRAIN STEM W/O DYE: CPT

## 2024-11-04 ASSESSMENT — SLEEP AND FATIGUE QUESTIONNAIRES
DO YOU HAVE DIFFICULTY BEING AS ACTIVE AS YOU WANT TO BE IN THE EVENING BECAUSE YOU ARE SLEEPY OR TIRED: YES, LITTLE
HOW LIKELY ARE YOU TO NOD OFF OR FALL ASLEEP IN A CAR, WHILE STOPPED FOR A FEW MINUTES IN TRAFFIC: WOULD NEVER DOZE
HOW LIKELY ARE YOU TO NOD OFF OR FALL ASLEEP WHILE SITTING AND TALKING TO SOMEONE: WOULD NEVER DOZE
HOW LIKELY ARE YOU TO NOD OFF OR FALL ASLEEP WHILE SITTING AND READING: WOULD NEVER DOZE
HAS YOUR MOOD BEEN AFFECTED BECAUSE YOU ARE SLEEPY OR TIRED: NO
HOW LIKELY ARE YOU TO NOD OFF OR FALL ASLEEP WHEN YOU ARE A PASSENGER IN A CAR FOR AN HOUR WITHOUT A BREAK: WOULD NEVER DOZE
HAS YOUR RELATIONSHIP WITH FAMILY, FRIENDS OR WORK COLLEAGUES BEEN AFFECTED BECAUSE YOU ARE SLEEPY OR TIRED: NO
HOW LIKELY ARE YOU TO NOD OFF OR FALL ASLEEP IN A CAR, WHILE STOPPED FOR A FEW MINUTES IN TRAFFIC: WOULD NEVER DOZE
FOSQ SCORE: 17
DO YOU HAVE DIFFICULTY VISITING YOUR FAMILY OR FRIENDS IN THEIR HOME BECAUSE YOU BECOME SLEEPY OR TIRED: YES, A LITTLE
HOW LIKELY ARE YOU TO NOD OFF OR FALL ASLEEP WHILE SITTING QUIETLY AFTER LUNCH WITHOUT ALCOHOL: SLIGHT CHANCE OF DOZING
DO YOU HAVE DIFFICULTY OPERATING A MOTOR VEHICLE FOR SHORT DISTANCES (LESS THAN 100 MILES) BECAUSE YOU BECOME SLEEPY: NO
DO YOU GENERALLY HAVE DIFFICULTY REMEMBERING THINGS BECAUSE YOU ARE SLEEPY OR TIRED: YES, A LITTLE
DO YOU HAVE DIFFICULTY BEING AS ACTIVE AS YOU WANT TO BE IN THE MORNING BECAUSE YOU ARE SLEEPY OR TIRED: YES, LITTLE
HOW LIKELY ARE YOU TO NOD OFF OR FALL ASLEEP WHILE WATCHING TV: MODERATE CHANCE OF DOZING
HOW LIKELY ARE YOU TO NOD OFF OR FALL ASLEEP WHILE SITTING INACTIVE IN A PUBLIC PLACE: WOULD NEVER DOZE
HOW LIKELY ARE YOU TO NOD OFF OR FALL ASLEEP WHILE SITTING INACTIVE IN A PUBLIC PLACE: WOULD NEVER DOZE
HOW LIKELY ARE YOU TO NOD OFF OR FALL ASLEEP WHILE SITTING AND TALKING TO SOMEONE: WOULD NEVER DOZE
DO YOU HAVE DIFFICULTY OPERATING A MOTOR VEHICLE FOR LONG DISTANCES (GREATER THAN 100 MILES) BECAUSE YOU BECOME SLEEPY: NO
ESS TOTAL SCORE: 4
HOW LIKELY ARE YOU TO NOD OFF OR FALL ASLEEP WHILE LYING DOWN TO REST IN THE AFTERNOON WHEN CIRCUMSTANCES PERMIT: SLIGHT CHANCE OF DOZING
DO YOU HAVE DIFFICULTY CONCENTRATING ON THE THINGS YOU DO BECAUSE YOU ARE SLEEPY OR TIRED: YES, A LITTLE
HOW LIKELY ARE YOU TO NOD OFF OR FALL ASLEEP WHILE SITTING AND READING: WOULD NEVER DOZE
DO YOU HAVE DIFFICULTY WATCHING A MOVIE OR VIDEO BECAUSE YOU BECOME SLEEPY OR TIRED: YES, A LITTLE
HOW LIKELY ARE YOU TO NOD OFF OR FALL ASLEEP WHILE LYING DOWN TO REST IN THE AFTERNOON WHEN CIRCUMSTANCES PERMIT: SLIGHT CHANCE OF DOZING
HOW LIKELY ARE YOU TO NOD OFF OR FALL ASLEEP WHILE WATCHING TV: MODERATE CHANCE OF DOZING
HOW LIKELY ARE YOU TO NOD OFF OR FALL ASLEEP WHILE SITTING QUIETLY AFTER LUNCH WITHOUT ALCOHOL: SLIGHT CHANCE OF DOZING
HOW LIKELY ARE YOU TO NOD OFF OR FALL ASLEEP WHEN YOU ARE A PASSENGER IN A CAR FOR AN HOUR WITHOUT A BREAK: WOULD NEVER DOZE

## 2024-11-05 ENCOUNTER — CLINICAL DOCUMENTATION (OUTPATIENT)
Age: 44
End: 2024-11-05

## 2024-11-05 ENCOUNTER — TELEMEDICINE (OUTPATIENT)
Age: 44
End: 2024-11-05
Payer: MEDICARE

## 2024-11-05 ENCOUNTER — TELEPHONE (OUTPATIENT)
Age: 44
End: 2024-11-05

## 2024-11-05 DIAGNOSIS — I10 PRIMARY HYPERTENSION: ICD-10-CM

## 2024-11-05 DIAGNOSIS — G47.33 OBSTRUCTIVE SLEEP APNEA (ADULT) (PEDIATRIC): Primary | ICD-10-CM

## 2024-11-05 PROCEDURE — 99213 OFFICE O/P EST LOW 20 MIN: CPT | Performed by: NURSE PRACTITIONER

## 2024-11-05 PROCEDURE — G8484 FLU IMMUNIZE NO ADMIN: HCPCS | Performed by: NURSE PRACTITIONER

## 2024-11-05 PROCEDURE — 1036F TOBACCO NON-USER: CPT | Performed by: NURSE PRACTITIONER

## 2024-11-05 PROCEDURE — G8417 CALC BMI ABV UP PARAM F/U: HCPCS | Performed by: NURSE PRACTITIONER

## 2024-11-05 PROCEDURE — G8427 DOCREV CUR MEDS BY ELIG CLIN: HCPCS | Performed by: NURSE PRACTITIONER

## 2024-11-05 NOTE — PATIENT INSTRUCTIONS
[address]  [place] [address]  Tel.  [phone]  Fax. [phone] [address]  [place] [address]  Tel.  [phone]  Fax. [phone] [address] [place] [address]  Tel.  [phone]  Fax. [phone]     Learning About CPAP for Sleep Apnea  What is CPAP?              CPAP is a small machine that you use at home every night while you sleep. It increases air pressure in your throat to keep your airway open. When you have sleep apnea, this can help you sleep better so you feel much better. CPAP stands for \"continuous positive airway pressure.\"  The CPAP machine will have one of the following:  A mask that covers your nose and mouth  Prongs that fit into your nose  A mask that covers your nose only, the most common type. This type is called NCPAP. The N stands for \"nasal.\"  Why is it done?  CPAP is usually the best treatment for obstructive sleep apnea. It is the first treatment choice and the most widely used. Your doctor may suggest CPAP if you have:  Moderate to severe sleep apnea.  Sleep apnea and coronary artery disease (CAD) or heart failure.  How does it help?  CPAP can help you have more normal sleep, so you feel less sleepy and more alert during the daytime.  CPAP may help keep heart failure or other heart problems from getting worse.  NCPAP may help lower your blood pressure.  If you use CPAP, your bed partner may also sleep better because you are not snoring or restless.  What are the side effects?  Some people who use CPAP have:  A dry or stuffy nose and a sore throat.  Irritated skin on the face.  Sore eyes.  Bloating.  If you have any of these problems, work with your doctor to fix them. Here are some things you can try:  Be sure the mask or nasal prongs fit well.  See if your doctor can adjust the pressure of your CPAP.  If your nose is dry, try a humidifier.  If your nose is runny or stuffy, try decongestant medicine or a steroid

## 2024-11-05 NOTE — PROGRESS NOTES
and the Coronavirus Preparedness and Response Supplemental Appropriations Act, this Virtual Visit was conducted with patient's (and/or legal guardian's) consent, to reduce the patient's risk of exposure to COVID-19 and provide necessary medical care. The patient (or guardian if applicable) is aware that this is a billable service, which includes applicable copays.     Patient identification was verified at the start of the visit: Yes using name and date of birth. Patient's phone number [phone] (home)  was confirmed for accuracy.  She gives permission for messages regarding results and appointments to be left at that number.    Services were provided through a video synchronous discussion virtually to substitute for in-person clinic visit.  I was  at home  while conducting this encounter, patient located at their home or alternate location of their choice.    Albertina Goodwin, was evaluated through a synchronous (real-time) audio-video encounter. The patient (or guardian if applicable) is aware that this is a billable service, which includes applicable co-pays. This Virtual Visit was conducted with patient's (and/or legal guardian's) consent. Patient identification was verified, and a caregiver was present when appropriate.   The patient was located at Home: [address] [place] [id number]  Provider was located at Home (Appt Dept State): [place]  Confirm you are appropriately licensed, registered, or certified to deliver care in the state where the patient is located as indicated above. If you are not or unsure, please re-schedule the visit: Yes, I confirm.       --ROSEANN Marmolejo NP on 11/5/2024 at 10:52 AM    An electronic signature was used to authenticate this note.

## 2024-12-17 DIAGNOSIS — G62.9 POLYNEUROPATHY: ICD-10-CM

## 2024-12-17 NOTE — TELEPHONE ENCOUNTER
Patient requesting lyrica refill    LOV: 09/10/24  Last refill: 09/10/24 with 0 refill  Next visit: 01/22/25

## 2024-12-18 RX ORDER — PREGABALIN 200 MG/1
200 CAPSULE ORAL 2 TIMES DAILY PRN
Qty: 60 CAPSULE | Refills: 0 | Status: SHIPPED | OUTPATIENT
Start: 2024-12-18 | End: 2025-01-17

## 2024-12-27 ENCOUNTER — TRANSCRIBE ORDERS (OUTPATIENT)
Facility: HOSPITAL | Age: 44
End: 2024-12-27

## 2024-12-27 ENCOUNTER — HOSPITAL ENCOUNTER (OUTPATIENT)
Facility: HOSPITAL | Age: 44
Discharge: HOME | End: 2024-12-30
Payer: MEDICARE

## 2024-12-27 DIAGNOSIS — M25.562 LEFT KNEE PAIN, UNSPECIFIED CHRONICITY: ICD-10-CM

## 2024-12-27 DIAGNOSIS — M25.562 LEFT KNEE PAIN, UNSPECIFIED CHRONICITY: Primary | ICD-10-CM

## 2024-12-27 PROCEDURE — 73562 X-RAY EXAM OF KNEE 3: CPT

## 2025-03-12 NOTE — PROGRESS NOTES
Neurology Clinic Follow up Note    Patient ID:   Albertina Goodwin  1980  44 y.o. female  [id number]      Chief Complaint   Patient presents with    Follow-up     Polyneuropathy(feet and hands)       Last Appointment With Me:    9/10/2024  \" 44 year old female with history of T2DM with polyneuropathy, RA, HTN, HLD, Gout, ELOY on CPAP, here for follow up to discuss management of polyneuropathy and abnormal MoCA scores completed with outside health system providers. For neuropathy symptoms, patient has had EMG in 2021, 2022 confirming this and reports increased burning pain with prolonged ambulation during the day. Will increase Lyrica to 200mg BID PRN, advised to continue to take Lyrica 200mg nightly and one additional tablet during the day as needed. Discussed potential for fatigue/next day sedation, dizziness with increasing Lyrica. She would like to try this and let me know if it helps. Advised to follow low carbohydrate diet and exercise at least 150 minutes per week to avoid exacerbation of neuropathy d/t T2DM.      For memory, her MoCA score is 24/30 and in the range of mild cognitive impairment. She has had prior MoCA tests with [facility] provider which were normal. We will request records of the previous exams to compare. For me, she had difficulty with executive functioning, serial subtractions, and language. Please see Media tab for scanned copy. Her PHQ score is 5, denies SI, and she follows regularly with counselor/Psychiatrist. For MCI, I would like her to see Dr Seaman for further clarification. The patient denies any trouble with day to day functioning and safety at home. She pays all of her own bills, manages her medications, drives herself, and does all of the meal prep at home and denies any concerns. She was alone today but appears to be good historian. To rule out intracranial pathology contributing to memory concerns, will proceed with MRI brain WO.  We will see her back in the

## 2025-03-13 ENCOUNTER — OFFICE VISIT (OUTPATIENT)
Age: 45
End: 2025-03-13
Payer: MEDICARE

## 2025-03-13 VITALS
BODY MASS INDEX: 49.26 KG/M2 | WEIGHT: 278 LBS | SYSTOLIC BLOOD PRESSURE: 134 MMHG | OXYGEN SATURATION: 98 % | TEMPERATURE: 83 F | RESPIRATION RATE: 17 BRPM | HEIGHT: 63 IN | DIASTOLIC BLOOD PRESSURE: 68 MMHG

## 2025-03-13 DIAGNOSIS — M25.562 ACUTE PAIN OF LEFT KNEE: ICD-10-CM

## 2025-03-13 DIAGNOSIS — G62.9 POLYNEUROPATHY: Primary | ICD-10-CM

## 2025-03-13 DIAGNOSIS — G31.84 MILD COGNITIVE IMPAIRMENT: ICD-10-CM

## 2025-03-13 PROCEDURE — G8427 DOCREV CUR MEDS BY ELIG CLIN: HCPCS

## 2025-03-13 PROCEDURE — 99215 OFFICE O/P EST HI 40 MIN: CPT

## 2025-03-13 PROCEDURE — 3075F SYST BP GE 130 - 139MM HG: CPT

## 2025-03-13 PROCEDURE — 1036F TOBACCO NON-USER: CPT

## 2025-03-13 PROCEDURE — 3078F DIAST BP <80 MM HG: CPT

## 2025-03-13 PROCEDURE — G8417 CALC BMI ABV UP PARAM F/U: HCPCS

## 2025-03-13 RX ORDER — PREGABALIN 200 MG/1
200 CAPSULE ORAL 2 TIMES DAILY PRN
Qty: 60 CAPSULE | Refills: 5 | Status: SHIPPED | OUTPATIENT
Start: 2025-03-13 | End: 2025-09-09

## 2025-03-13 RX ORDER — PERFLUOROHEXYLOCTANE 1 MG/MG
SOLUTION OPHTHALMIC
COMMUNITY
Start: 2025-03-01

## 2025-03-13 ASSESSMENT — PATIENT HEALTH QUESTIONNAIRE - PHQ9
1. LITTLE INTEREST OR PLEASURE IN DOING THINGS: NOT AT ALL
SUM OF ALL RESPONSES TO PHQ QUESTIONS 1-9: 0
2. FEELING DOWN, DEPRESSED OR HOPELESS: NOT AT ALL

## 2025-03-13 ASSESSMENT — VISUAL ACUITY: VA_NORMAL: 1

## 2025-07-07 ENCOUNTER — TRANSCRIBE ORDERS (OUTPATIENT)
Facility: HOSPITAL | Age: 45
End: 2025-07-07

## 2025-07-07 DIAGNOSIS — Z12.31 VISIT FOR SCREENING MAMMOGRAM: Primary | ICD-10-CM

## 2025-08-01 ENCOUNTER — HOSPITAL ENCOUNTER (OUTPATIENT)
Facility: HOSPITAL | Age: 45
Discharge: HOME | End: 2025-08-01
Payer: MEDICARE

## 2025-08-01 VITALS — HEIGHT: 63 IN | WEIGHT: 275 LBS | BODY MASS INDEX: 48.73 KG/M2

## 2025-08-01 DIAGNOSIS — Z12.31 VISIT FOR SCREENING MAMMOGRAM: ICD-10-CM

## 2025-08-01 PROCEDURE — 77063 BREAST TOMOSYNTHESIS BI: CPT
